# Patient Record
Sex: FEMALE | Race: WHITE | NOT HISPANIC OR LATINO | Employment: OTHER | URBAN - METROPOLITAN AREA
[De-identification: names, ages, dates, MRNs, and addresses within clinical notes are randomized per-mention and may not be internally consistent; named-entity substitution may affect disease eponyms.]

---

## 2017-01-01 ENCOUNTER — HOSPITAL ENCOUNTER (INPATIENT)
Facility: HOSPITAL | Age: 67
LOS: 2 days | Discharge: HOME | DRG: 378 | End: 2017-07-27
Attending: EMERGENCY MEDICINE | Admitting: HOSPITALIST
Payer: MEDICARE

## 2017-01-01 ENCOUNTER — HOSPITAL ENCOUNTER (OUTPATIENT)
Dept: RADIOLOGY | Facility: HOSPITAL | Age: 67
Discharge: HOME | End: 2017-03-20
Attending: INTERNAL MEDICINE
Payer: MEDICARE

## 2017-01-01 ENCOUNTER — OFFICE VISIT (OUTPATIENT)
Dept: FAMILY MEDICINE | Facility: CLINIC | Age: 67
End: 2017-01-01
Payer: MEDICARE

## 2017-01-01 ENCOUNTER — TELEPHONE (OUTPATIENT)
Dept: FAMILY MEDICINE | Facility: CLINIC | Age: 67
End: 2017-01-01

## 2017-01-01 ENCOUNTER — LAB VISIT (OUTPATIENT)
Dept: LAB | Facility: HOSPITAL | Age: 67
End: 2017-01-01
Attending: FAMILY MEDICINE
Payer: MEDICARE

## 2017-01-01 ENCOUNTER — ANESTHESIA (OUTPATIENT)
Dept: INTENSIVE CARE | Facility: HOSPITAL | Age: 67
DRG: 356 | End: 2017-01-01
Payer: MEDICARE

## 2017-01-01 ENCOUNTER — TELEPHONE (OUTPATIENT)
Dept: NEUROLOGY | Facility: HOSPITAL | Age: 67
End: 2017-01-01

## 2017-01-01 ENCOUNTER — ANESTHESIA EVENT (OUTPATIENT)
Dept: SURGERY | Facility: HOSPITAL | Age: 67
DRG: 356 | End: 2017-01-01
Payer: MEDICARE

## 2017-01-01 ENCOUNTER — HOSPITAL ENCOUNTER (EMERGENCY)
Facility: HOSPITAL | Age: 67
Discharge: HOME | End: 2017-07-09
Attending: EMERGENCY MEDICINE
Payer: MEDICARE

## 2017-01-01 ENCOUNTER — ANESTHESIA (OUTPATIENT)
Dept: ENDOSCOPY | Facility: HOSPITAL | Age: 67
DRG: 378 | End: 2017-01-01
Payer: MEDICARE

## 2017-01-01 ENCOUNTER — LAB VISIT (OUTPATIENT)
Dept: LAB | Facility: HOSPITAL | Age: 67
End: 2017-01-01
Attending: INTERNAL MEDICINE
Payer: MEDICARE

## 2017-01-01 ENCOUNTER — ANESTHESIA EVENT (OUTPATIENT)
Dept: INTENSIVE CARE | Facility: HOSPITAL | Age: 67
DRG: 356 | End: 2017-01-01
Payer: MEDICARE

## 2017-01-01 ENCOUNTER — SURGERY (OUTPATIENT)
Age: 67
End: 2017-01-01

## 2017-01-01 ENCOUNTER — HOSPITAL ENCOUNTER (INPATIENT)
Facility: HOSPITAL | Age: 67
LOS: 2 days | Discharge: HOME | DRG: 372 | End: 2017-07-23
Attending: EMERGENCY MEDICINE | Admitting: HOSPITALIST
Payer: MEDICARE

## 2017-01-01 ENCOUNTER — OFFICE VISIT (OUTPATIENT)
Dept: GASTROENTEROLOGY | Facility: CLINIC | Age: 67
End: 2017-01-01
Payer: MEDICARE

## 2017-01-01 ENCOUNTER — ANESTHESIA EVENT (OUTPATIENT)
Dept: ENDOSCOPY | Facility: HOSPITAL | Age: 67
DRG: 378 | End: 2017-01-01
Payer: MEDICARE

## 2017-01-01 ENCOUNTER — HOSPITAL ENCOUNTER (INPATIENT)
Facility: HOSPITAL | Age: 67
LOS: 2 days | DRG: 356 | End: 2017-10-11
Attending: EMERGENCY MEDICINE | Admitting: HOSPITALIST
Payer: MEDICARE

## 2017-01-01 ENCOUNTER — HOSPITAL ENCOUNTER (OUTPATIENT)
Dept: RADIOLOGY | Facility: HOSPITAL | Age: 67
Discharge: HOME | End: 2017-01-06
Attending: FAMILY MEDICINE
Payer: MEDICARE

## 2017-01-01 ENCOUNTER — CLINICAL SUPPORT (OUTPATIENT)
Dept: FAMILY MEDICINE | Facility: CLINIC | Age: 67
End: 2017-01-01
Payer: MEDICARE

## 2017-01-01 ENCOUNTER — PATIENT OUTREACH (OUTPATIENT)
Dept: ADMINISTRATIVE | Facility: CLINIC | Age: 67
End: 2017-01-01

## 2017-01-01 ENCOUNTER — HOSPITAL ENCOUNTER (OUTPATIENT)
Dept: CARDIOLOGY | Facility: HOSPITAL | Age: 67
Discharge: HOME | End: 2017-03-20
Attending: INTERNAL MEDICINE
Payer: MEDICARE

## 2017-01-01 ENCOUNTER — HOSPITAL ENCOUNTER (OUTPATIENT)
Dept: RADIOLOGY | Facility: HOSPITAL | Age: 67
Discharge: HOME | End: 2017-03-17
Attending: INTERNAL MEDICINE
Payer: MEDICARE

## 2017-01-01 ENCOUNTER — ANESTHESIA (OUTPATIENT)
Dept: SURGERY | Facility: HOSPITAL | Age: 67
DRG: 356 | End: 2017-01-01
Payer: MEDICARE

## 2017-01-01 ENCOUNTER — HOSPITAL ENCOUNTER (OUTPATIENT)
Dept: RADIOLOGY | Facility: HOSPITAL | Age: 67
Discharge: HOME | End: 2017-05-08
Attending: FAMILY MEDICINE
Payer: MEDICARE

## 2017-01-01 VITALS
BODY MASS INDEX: 27.48 KG/M2 | DIASTOLIC BLOOD PRESSURE: 61 MMHG | HEIGHT: 60 IN | OXYGEN SATURATION: 96 % | WEIGHT: 140 LBS | SYSTOLIC BLOOD PRESSURE: 167 MMHG | RESPIRATION RATE: 18 BRPM | HEART RATE: 92 BPM | TEMPERATURE: 99 F

## 2017-01-01 VITALS
DIASTOLIC BLOOD PRESSURE: 58 MMHG | OXYGEN SATURATION: 100 % | HEART RATE: 74 BPM | WEIGHT: 134.69 LBS | HEIGHT: 60 IN | BODY MASS INDEX: 26.44 KG/M2 | RESPIRATION RATE: 18 BRPM | TEMPERATURE: 98 F | SYSTOLIC BLOOD PRESSURE: 122 MMHG

## 2017-01-01 VITALS
OXYGEN SATURATION: 95 % | SYSTOLIC BLOOD PRESSURE: 132 MMHG | HEART RATE: 73 BPM | HEIGHT: 60 IN | DIASTOLIC BLOOD PRESSURE: 78 MMHG | WEIGHT: 141.13 LBS | TEMPERATURE: 99 F | BODY MASS INDEX: 27.71 KG/M2

## 2017-01-01 VITALS
HEIGHT: 60 IN | WEIGHT: 134.63 LBS | HEART RATE: 84 BPM | DIASTOLIC BLOOD PRESSURE: 61 MMHG | BODY MASS INDEX: 26.43 KG/M2 | SYSTOLIC BLOOD PRESSURE: 112 MMHG

## 2017-01-01 VITALS
OXYGEN SATURATION: 96 % | WEIGHT: 135.13 LBS | SYSTOLIC BLOOD PRESSURE: 136 MMHG | TEMPERATURE: 98 F | HEART RATE: 84 BPM | BODY MASS INDEX: 26.53 KG/M2 | DIASTOLIC BLOOD PRESSURE: 88 MMHG | HEIGHT: 60 IN

## 2017-01-01 VITALS
WEIGHT: 134 LBS | BODY MASS INDEX: 26.31 KG/M2 | SYSTOLIC BLOOD PRESSURE: 131 MMHG | DIASTOLIC BLOOD PRESSURE: 61 MMHG | HEIGHT: 60 IN | HEART RATE: 84 BPM

## 2017-01-01 VITALS
OXYGEN SATURATION: 98 % | HEIGHT: 60 IN | SYSTOLIC BLOOD PRESSURE: 128 MMHG | HEART RATE: 79 BPM | DIASTOLIC BLOOD PRESSURE: 82 MMHG | BODY MASS INDEX: 26.44 KG/M2 | WEIGHT: 134.69 LBS | TEMPERATURE: 98 F

## 2017-01-01 VITALS
HEART RATE: 80 BPM | HEIGHT: 60 IN | BODY MASS INDEX: 27.61 KG/M2 | SYSTOLIC BLOOD PRESSURE: 128 MMHG | DIASTOLIC BLOOD PRESSURE: 61 MMHG | WEIGHT: 140.63 LBS

## 2017-01-01 VITALS
WEIGHT: 138.88 LBS | SYSTOLIC BLOOD PRESSURE: 99 MMHG | HEIGHT: 60 IN | DIASTOLIC BLOOD PRESSURE: 64 MMHG | OXYGEN SATURATION: 87 % | BODY MASS INDEX: 27.26 KG/M2 | TEMPERATURE: 100 F

## 2017-01-01 VITALS
BODY MASS INDEX: 26.6 KG/M2 | DIASTOLIC BLOOD PRESSURE: 89 MMHG | SYSTOLIC BLOOD PRESSURE: 148 MMHG | TEMPERATURE: 98 F | OXYGEN SATURATION: 95 % | HEIGHT: 60 IN | HEART RATE: 78 BPM | WEIGHT: 135.5 LBS | RESPIRATION RATE: 18 BRPM

## 2017-01-01 VITALS — DIASTOLIC BLOOD PRESSURE: 80 MMHG | SYSTOLIC BLOOD PRESSURE: 142 MMHG

## 2017-01-01 VITALS
WEIGHT: 138.25 LBS | DIASTOLIC BLOOD PRESSURE: 76 MMHG | SYSTOLIC BLOOD PRESSURE: 124 MMHG | OXYGEN SATURATION: 98 % | TEMPERATURE: 97 F | HEART RATE: 72 BPM | RESPIRATION RATE: 18 BRPM | BODY MASS INDEX: 27.14 KG/M2 | HEIGHT: 60 IN

## 2017-01-01 DIAGNOSIS — E11.9 TYPE 2 DIABETES MELLITUS WITHOUT COMPLICATION, WITHOUT LONG-TERM CURRENT USE OF INSULIN: ICD-10-CM

## 2017-01-01 DIAGNOSIS — E78.5 HYPERLIPIDEMIA, UNSPECIFIED HYPERLIPIDEMIA TYPE: ICD-10-CM

## 2017-01-01 DIAGNOSIS — K74.60 CIRRHOSIS OF LIVER WITHOUT ASCITES, UNSPECIFIED HEPATIC CIRRHOSIS TYPE: Primary | ICD-10-CM

## 2017-01-01 DIAGNOSIS — I25.810 CORONARY ARTERY DISEASE INVOLVING NONAUTOLOGOUS BIOLOGICAL CORONARY BYPASS GRAFT WITHOUT ANGINA PECTORIS: Chronic | ICD-10-CM

## 2017-01-01 DIAGNOSIS — I10 ESSENTIAL HYPERTENSION: ICD-10-CM

## 2017-01-01 DIAGNOSIS — E11.9 TYPE 2 DIABETES MELLITUS WITHOUT COMPLICATION, WITHOUT LONG-TERM CURRENT USE OF INSULIN: Primary | Chronic | ICD-10-CM

## 2017-01-01 DIAGNOSIS — K74.60 CIRRHOSIS OF LIVER WITHOUT ASCITES, UNSPECIFIED HEPATIC CIRRHOSIS TYPE: Chronic | ICD-10-CM

## 2017-01-01 DIAGNOSIS — A04.72 C. DIFFICILE COLITIS: ICD-10-CM

## 2017-01-01 DIAGNOSIS — M79.2 NEURALGIA: ICD-10-CM

## 2017-01-01 DIAGNOSIS — I63.9 CEREBROVASCULAR ACCIDENT (CVA), UNSPECIFIED MECHANISM: ICD-10-CM

## 2017-01-01 DIAGNOSIS — K92.2 LOWER GI BLEEDING: Primary | ICD-10-CM

## 2017-01-01 DIAGNOSIS — E11.59 TYPE 2 DIABETES MELLITUS WITH OTHER CIRCULATORY COMPLICATION: ICD-10-CM

## 2017-01-01 DIAGNOSIS — Z95.1 S/P CABG (CORONARY ARTERY BYPASS GRAFT): ICD-10-CM

## 2017-01-01 DIAGNOSIS — Z86.010 H/O ADENOMATOUS POLYP OF COLON: ICD-10-CM

## 2017-01-01 DIAGNOSIS — I25.2 OLD MI (MYOCARDIAL INFARCTION): ICD-10-CM

## 2017-01-01 DIAGNOSIS — E83.42 HYPOMAGNESEMIA: ICD-10-CM

## 2017-01-01 DIAGNOSIS — Z86.73 HISTORY OF STROKE: Chronic | ICD-10-CM

## 2017-01-01 DIAGNOSIS — R27.0 ATAXIA: ICD-10-CM

## 2017-01-01 DIAGNOSIS — R19.7 DIARRHEA OF PRESUMED INFECTIOUS ORIGIN: Primary | ICD-10-CM

## 2017-01-01 DIAGNOSIS — I25.10 CVD (CARDIOVASCULAR DISEASE): ICD-10-CM

## 2017-01-01 DIAGNOSIS — R10.9 ABDOMINAL PAIN, UNSPECIFIED LOCATION: Primary | ICD-10-CM

## 2017-01-01 DIAGNOSIS — A04.72 CLOSTRIDIUM DIFFICILE DIARRHEA: Primary | ICD-10-CM

## 2017-01-01 DIAGNOSIS — K74.60 HEPATIC CIRRHOSIS, UNSPECIFIED HEPATIC CIRRHOSIS TYPE: ICD-10-CM

## 2017-01-01 DIAGNOSIS — F41.9 ANXIETY: Primary | ICD-10-CM

## 2017-01-01 DIAGNOSIS — R10.84 GENERALIZED ABDOMINAL PAIN: ICD-10-CM

## 2017-01-01 DIAGNOSIS — K74.60 CIRRHOSIS OF LIVER WITHOUT ASCITES, UNSPECIFIED HEPATIC CIRRHOSIS TYPE: ICD-10-CM

## 2017-01-01 DIAGNOSIS — F17.200 SMOKES: ICD-10-CM

## 2017-01-01 DIAGNOSIS — Z98.61 S/P PTCA (PERCUTANEOUS TRANSLUMINAL CORONARY ANGIOPLASTY): ICD-10-CM

## 2017-01-01 DIAGNOSIS — F32.A DEPRESSION, UNSPECIFIED DEPRESSION TYPE: ICD-10-CM

## 2017-01-01 DIAGNOSIS — K21.9 GASTROESOPHAGEAL REFLUX DISEASE WITHOUT ESOPHAGITIS: ICD-10-CM

## 2017-01-01 DIAGNOSIS — E11.9 TYPE 2 DIABETES MELLITUS WITHOUT COMPLICATION, WITHOUT LONG-TERM CURRENT USE OF INSULIN: Primary | ICD-10-CM

## 2017-01-01 DIAGNOSIS — E11.9 TYPE 2 DIABETES MELLITUS WITHOUT COMPLICATION, WITHOUT LONG-TERM CURRENT USE OF INSULIN: Chronic | ICD-10-CM

## 2017-01-01 DIAGNOSIS — K21.9 GASTROESOPHAGEAL REFLUX DISEASE, ESOPHAGITIS PRESENCE NOT SPECIFIED: ICD-10-CM

## 2017-01-01 DIAGNOSIS — I10 ESSENTIAL HYPERTENSION: Chronic | ICD-10-CM

## 2017-01-01 DIAGNOSIS — J40 BRONCHITIS: Primary | ICD-10-CM

## 2017-01-01 DIAGNOSIS — K56.600 SMALL BOWEL OBSTRUCTION, PARTIAL: ICD-10-CM

## 2017-01-01 DIAGNOSIS — I25.10 CORONARY ARTERY DISEASE INVOLVING NATIVE CORONARY ARTERY OF NATIVE HEART WITHOUT ANGINA PECTORIS: ICD-10-CM

## 2017-01-01 DIAGNOSIS — F32.A DEPRESSION, UNSPECIFIED DEPRESSION TYPE: Primary | ICD-10-CM

## 2017-01-01 DIAGNOSIS — Z95.1 S/P CABG (CORONARY ARTERY BYPASS GRAFT): Chronic | ICD-10-CM

## 2017-01-01 DIAGNOSIS — K92.1 HEMATOCHEZIA: ICD-10-CM

## 2017-01-01 DIAGNOSIS — I25.10 CORONARY ARTERY DISEASE INVOLVING NATIVE HEART, ANGINA PRESENCE UNSPECIFIED, UNSPECIFIED VESSEL OR LESION TYPE: ICD-10-CM

## 2017-01-01 DIAGNOSIS — K55.9 ISCHEMIC COLITIS: ICD-10-CM

## 2017-01-01 DIAGNOSIS — I25.810 CORONARY ARTERY DISEASE INVOLVING NONAUTOLOGOUS BIOLOGICAL CORONARY BYPASS GRAFT WITHOUT ANGINA PECTORIS: ICD-10-CM

## 2017-01-01 DIAGNOSIS — D62 ACUTE BLOOD LOSS ANEMIA: ICD-10-CM

## 2017-01-01 DIAGNOSIS — R41.3 MEMORY LOSS: ICD-10-CM

## 2017-01-01 DIAGNOSIS — R06.09 DYSPNEA ON EXERTION: ICD-10-CM

## 2017-01-01 DIAGNOSIS — D72.829 LEUKOCYTOSIS, UNSPECIFIED TYPE: ICD-10-CM

## 2017-01-01 DIAGNOSIS — E83.19 IRON OVERLOAD: Primary | ICD-10-CM

## 2017-01-01 DIAGNOSIS — R11.10 VOMITING AND DIARRHEA: ICD-10-CM

## 2017-01-01 DIAGNOSIS — E11.9 TYPE 2 DIABETES MELLITUS WITHOUT COMPLICATION, UNSPECIFIED LONG TERM INSULIN USE STATUS: Primary | ICD-10-CM

## 2017-01-01 DIAGNOSIS — R19.7 INTRACTABLE DIARRHEA: ICD-10-CM

## 2017-01-01 DIAGNOSIS — K92.2 ACUTE UPPER GI BLEED: ICD-10-CM

## 2017-01-01 DIAGNOSIS — R11.12 PROJECTILE VOMITING WITH NAUSEA: ICD-10-CM

## 2017-01-01 DIAGNOSIS — R19.7 VOMITING AND DIARRHEA: ICD-10-CM

## 2017-01-01 DIAGNOSIS — F41.9 ANXIETY: ICD-10-CM

## 2017-01-01 DIAGNOSIS — I10 ESSENTIAL HYPERTENSION: Primary | ICD-10-CM

## 2017-01-01 DIAGNOSIS — N39.0 URINARY TRACT INFECTION WITHOUT HEMATURIA, SITE UNSPECIFIED: ICD-10-CM

## 2017-01-01 DIAGNOSIS — K46.9 HERNIA: Primary | ICD-10-CM

## 2017-01-01 DIAGNOSIS — K92.2 GASTROINTESTINAL HEMORRHAGE, UNSPECIFIED GASTROINTESTINAL HEMORRHAGE TYPE: Primary | ICD-10-CM

## 2017-01-01 DIAGNOSIS — K92.2 GASTROINTESTINAL HEMORRHAGE, UNSPECIFIED GASTROINTESTINAL HEMORRHAGE TYPE: ICD-10-CM

## 2017-01-01 DIAGNOSIS — D64.9 ANEMIA, UNSPECIFIED TYPE: ICD-10-CM

## 2017-01-01 DIAGNOSIS — K55.9: ICD-10-CM

## 2017-01-01 DIAGNOSIS — E11.9 TYPE 2 DIABETES MELLITUS WITHOUT COMPLICATION, UNSPECIFIED LONG TERM INSULIN USE STATUS: ICD-10-CM

## 2017-01-01 DIAGNOSIS — I95.9 HYPOTENSION: ICD-10-CM

## 2017-01-01 DIAGNOSIS — K21.9 GASTROESOPHAGEAL REFLUX DISEASE, ESOPHAGITIS PRESENCE NOT SPECIFIED: Primary | ICD-10-CM

## 2017-01-01 LAB
ABO + RH BLD: NORMAL
ABO + RH BLD: NORMAL
ALBUMIN SERPL BCP-MCNC: 2.2 G/DL
ALBUMIN SERPL BCP-MCNC: 2.3 G/DL
ALBUMIN SERPL BCP-MCNC: 2.8 G/DL
ALBUMIN SERPL BCP-MCNC: 3.6 G/DL
ALBUMIN SERPL BCP-MCNC: 3.6 G/DL
ALBUMIN SERPL BCP-MCNC: 3.8 G/DL
ALBUMIN SERPL BCP-MCNC: 4.7 G/DL
ALBUMIN SERPL BCP-MCNC: 4.8 G/DL
ALBUMIN SERPL-MCNC: 4.3 G/DL (ref 3.6–5.1)
ALBUMIN/GLOB SERPL: 1.7 (CALC) (ref 1–2.5)
ALLENS TEST: ABNORMAL
ALLENS TEST: ABNORMAL
ALP SERPL-CCNC: 110 U/L
ALP SERPL-CCNC: 37 U/L
ALP SERPL-CCNC: 62 U/L
ALP SERPL-CCNC: 81 U/L
ALP SERPL-CCNC: 87 U/L
ALP SERPL-CCNC: 95 U/L
ALP SERPL-CCNC: 97 U/L
ALP SERPL-CCNC: 98 U/L
ALP SERPL-CCNC: 99 U/L (ref 33–130)
ALT SERPL W/O P-5'-P-CCNC: 14 U/L
ALT SERPL W/O P-5'-P-CCNC: 20 U/L
ALT SERPL W/O P-5'-P-CCNC: 24 U/L
ALT SERPL W/O P-5'-P-CCNC: 27 U/L
ALT SERPL W/O P-5'-P-CCNC: 29 U/L
ALT SERPL W/O P-5'-P-CCNC: 32 U/L
ALT SERPL W/O P-5'-P-CCNC: 51 U/L
ALT SERPL W/O P-5'-P-CCNC: 968 U/L
ALT SERPL-CCNC: 16 U/L (ref 6–29)
AMORPH CRY UR QL COMP ASSIST: ABNORMAL
AMYLASE SERPL-CCNC: 47 U/L (ref 21–101)
ANION GAP SERPL CALC-SCNC: 10 MMOL/L
ANION GAP SERPL CALC-SCNC: 13 MMOL/L
ANION GAP SERPL CALC-SCNC: 13 MMOL/L
ANION GAP SERPL CALC-SCNC: 15 MMOL/L
ANION GAP SERPL CALC-SCNC: 17 MMOL/L
ANION GAP SERPL CALC-SCNC: 17 MMOL/L
ANION GAP SERPL CALC-SCNC: 21 MMOL/L
ANION GAP SERPL CALC-SCNC: 23 MMOL/L
ANION GAP SERPL CALC-SCNC: 7 MMOL/L
ANION GAP SERPL CALC-SCNC: 7 MMOL/L
ANION GAP SERPL CALC-SCNC: 8 MMOL/L
ANISOCYTOSIS BLD QL SMEAR: SLIGHT
APPEARANCE UR: CLEAR
APTT BLDCRRT: 22.4 SEC
APTT BLDCRRT: 24.3 SEC
AST SERPL-CCNC: 108 U/L
AST SERPL-CCNC: 1731 U/L
AST SERPL-CCNC: 19 U/L
AST SERPL-CCNC: 22 U/L (ref 10–35)
AST SERPL-CCNC: 35 U/L
AST SERPL-CCNC: 42 U/L
AST SERPL-CCNC: 44 U/L
AST SERPL-CCNC: 46 U/L
AST SERPL-CCNC: 63 U/L
BACTERIA #/AREA URNS AUTO: ABNORMAL /HPF
BACTERIA #/AREA URNS AUTO: ABNORMAL /HPF
BACTERIA #/AREA URNS HPF: ABNORMAL /HPF
BACTERIA BLD CULT: NORMAL
BACTERIA STL CULT: NORMAL
BACTERIA UR CULT: NO GROWTH
BACTERIA UR CULT: NORMAL
BASOPHILS # BLD AUTO: 0 CELLS/UL (ref 0–200)
BASOPHILS # BLD AUTO: 0 K/UL
BASOPHILS # BLD AUTO: 0.01 K/UL
BASOPHILS # BLD AUTO: 0.01 K/UL
BASOPHILS # BLD AUTO: 0.02 K/UL
BASOPHILS # BLD AUTO: 0.03 K/UL
BASOPHILS # BLD AUTO: 8 CELLS/UL (ref 0–200)
BASOPHILS # BLD AUTO: ABNORMAL K/UL
BASOPHILS # BLD AUTO: ABNORMAL K/UL
BASOPHILS NFR BLD AUTO: 0 %
BASOPHILS NFR BLD AUTO: 0.1 %
BASOPHILS NFR BLD: 0 %
BASOPHILS NFR BLD: 0.1 %
BASOPHILS NFR BLD: 0.2 %
BILIRUB SERPL-MCNC: 0.3 MG/DL
BILIRUB SERPL-MCNC: 0.4 MG/DL
BILIRUB SERPL-MCNC: 0.6 MG/DL
BILIRUB SERPL-MCNC: 0.6 MG/DL (ref 0.2–1.2)
BILIRUB SERPL-MCNC: 0.7 MG/DL
BILIRUB SERPL-MCNC: 0.9 MG/DL
BILIRUB SERPL-MCNC: 0.9 MG/DL
BILIRUB SERPL-MCNC: 1.2 MG/DL
BILIRUB SERPL-MCNC: 2.8 MG/DL
BILIRUB UR QL STRIP: ABNORMAL
BILIRUB UR QL STRIP: NEGATIVE
BLD GP AB SCN CELLS X3 SERPL QL: NORMAL
BLD GP AB SCN CELLS X3 SERPL QL: NORMAL
BLD PROD TYP BPU: NORMAL
BLOOD UNIT EXPIRATION DATE: NORMAL
BLOOD UNIT TYPE CODE: 7300
BLOOD UNIT TYPE: NORMAL
BUN SERPL-MCNC: 10 MG/DL
BUN SERPL-MCNC: 10 MG/DL
BUN SERPL-MCNC: 15 MG/DL
BUN SERPL-MCNC: 15 MG/DL (ref 7–25)
BUN SERPL-MCNC: 27 MG/DL
BUN SERPL-MCNC: 27 MG/DL
BUN SERPL-MCNC: 31 MG/DL
BUN SERPL-MCNC: 44 MG/DL
BUN SERPL-MCNC: 46 MG/DL
BUN SERPL-MCNC: 50 MG/DL
BUN SERPL-MCNC: 60 MG/DL
BUN SERPL-MCNC: 9 MG/DL
BUN/CREAT SERPL: 14 (CALC) (ref 6–22)
BURR CELLS BLD QL SMEAR: ABNORMAL
BURR CELLS BLD QL SMEAR: ABNORMAL
C DIFF GDH STL QL: NEGATIVE
C DIFF GDH STL QL: POSITIVE
C DIFF GDH STL QL: POSITIVE
C DIFF TOX A+B STL QL IA: NEGATIVE
C DIFF TOX A+B STL QL IA: NEGATIVE
C DIFF TOX A+B STL QL IA: POSITIVE
C DIFF TOX GENS STL QL NAA+PROBE: NEGATIVE
CALCIUM SERPL-MCNC: 10.1 MG/DL
CALCIUM SERPL-MCNC: 10.3 MG/DL
CALCIUM SERPL-MCNC: 10.4 MG/DL (ref 8.6–10.4)
CALCIUM SERPL-MCNC: 10.6 MG/DL
CALCIUM SERPL-MCNC: 6.6 MG/DL
CALCIUM SERPL-MCNC: 6.6 MG/DL
CALCIUM SERPL-MCNC: 8.1 MG/DL
CALCIUM SERPL-MCNC: 8.4 MG/DL
CALCIUM SERPL-MCNC: 8.5 MG/DL
CALCIUM SERPL-MCNC: 9.4 MG/DL
CHLORIDE SERPL-SCNC: 101 MMOL/L (ref 98–110)
CHLORIDE SERPL-SCNC: 102 MMOL/L
CHLORIDE SERPL-SCNC: 105 MMOL/L
CHLORIDE SERPL-SCNC: 106 MMOL/L
CHLORIDE SERPL-SCNC: 107 MMOL/L
CHLORIDE SERPL-SCNC: 108 MMOL/L
CHLORIDE SERPL-SCNC: 113 MMOL/L
CHLORIDE SERPL-SCNC: 113 MMOL/L
CHLORIDE SERPL-SCNC: 114 MMOL/L
CHLORIDE SERPL-SCNC: 93 MMOL/L
CHLORIDE SERPL-SCNC: 96 MMOL/L
CHLORIDE SERPL-SCNC: 98 MMOL/L
CHOLEST SERPL-MCNC: 153 MG/DL (ref 125–200)
CHOLEST/HDLC SERPL: 4.1 (CALC)
CLARITY UR REFRACT.AUTO: ABNORMAL
CLARITY UR REFRACT.AUTO: CLEAR
CLARITY UR: ABNORMAL
CLARITY UR: CLEAR
CO2 SERPL-SCNC: 11 MMOL/L
CO2 SERPL-SCNC: 15 MMOL/L
CO2 SERPL-SCNC: 17 MMOL/L
CO2 SERPL-SCNC: 18 MMOL/L
CO2 SERPL-SCNC: 19 MMOL/L
CO2 SERPL-SCNC: 20 MMOL/L
CO2 SERPL-SCNC: 22 MMOL/L
CO2 SERPL-SCNC: 23 MMOL/L
CO2 SERPL-SCNC: 27 MMOL/L (ref 20–31)
CO2 SERPL-SCNC: 6 MMOL/L
CODING SYSTEM: NORMAL
COLOR UR AUTO: ABNORMAL
COLOR UR AUTO: YELLOW
COLOR UR: YELLOW
CREAT SERPL-MCNC: 0.9 MG/DL
CREAT SERPL-MCNC: 0.9 MG/DL
CREAT SERPL-MCNC: 1 MG/DL
CREAT SERPL-MCNC: 1.06 MG/DL (ref 0.5–0.99)
CREAT SERPL-MCNC: 1.17 MG/DL
CREAT SERPL-MCNC: 1.2 MG/DL
CREAT SERPL-MCNC: 1.3 MG/DL
CREAT SERPL-MCNC: 1.6 MG/DL
CREAT SERPL-MCNC: 1.66 MG/DL
CREAT SERPL-MCNC: 1.8 MG/DL
CREAT SERPL-MCNC: 2.5 MG/DL
CREAT SERPL-MCNC: 2.7 MG/DL
DACRYOCYTES BLD QL SMEAR: ABNORMAL
DELSYS: ABNORMAL
DELSYS: ABNORMAL
DIASTOLIC DYSFUNCTION: NO
DIFFERENTIAL METHOD: ABNORMAL
DISPENSE STATUS: NORMAL
EOSINOPHIL # BLD AUTO: 0 K/UL
EOSINOPHIL # BLD AUTO: 0.1 K/UL
EOSINOPHIL # BLD AUTO: 114 CELLS/UL (ref 15–500)
EOSINOPHIL # BLD AUTO: 82 CELLS/UL (ref 15–500)
EOSINOPHIL # BLD AUTO: ABNORMAL K/UL
EOSINOPHIL # BLD AUTO: ABNORMAL K/UL
EOSINOPHIL NFR BLD AUTO: 1 %
EOSINOPHIL NFR BLD AUTO: 1.7 %
EOSINOPHIL NFR BLD: 0 %
EOSINOPHIL NFR BLD: 0.1 %
EOSINOPHIL NFR BLD: 0.1 %
EOSINOPHIL NFR BLD: 0.4 %
EOSINOPHIL NFR BLD: 0.5 %
EOSINOPHIL NFR BLD: 0.6 %
EOSINOPHIL NFR BLD: 0.8 %
EOSINOPHIL NFR BLD: 1.1 %
EOSINOPHIL NFR BLD: 1.4 %
EOSINOPHIL NFR BLD: 2.5 %
ERYTHROCYTE [DISTWIDTH] IN BLOOD BY AUTOMATED COUNT: 13.5 %
ERYTHROCYTE [DISTWIDTH] IN BLOOD BY AUTOMATED COUNT: 14.5 % (ref 11–15)
ERYTHROCYTE [DISTWIDTH] IN BLOOD BY AUTOMATED COUNT: 15.7 % (ref 11–15)
ERYTHROCYTE [DISTWIDTH] IN BLOOD BY AUTOMATED COUNT: 16.2 %
ERYTHROCYTE [DISTWIDTH] IN BLOOD BY AUTOMATED COUNT: 16.2 %
ERYTHROCYTE [DISTWIDTH] IN BLOOD BY AUTOMATED COUNT: 16.3 %
ERYTHROCYTE [DISTWIDTH] IN BLOOD BY AUTOMATED COUNT: 16.7 %
ERYTHROCYTE [DISTWIDTH] IN BLOOD BY AUTOMATED COUNT: 16.9 %
ERYTHROCYTE [DISTWIDTH] IN BLOOD BY AUTOMATED COUNT: 17.1 %
ERYTHROCYTE [DISTWIDTH] IN BLOOD BY AUTOMATED COUNT: 17.3 %
ERYTHROCYTE [DISTWIDTH] IN BLOOD BY AUTOMATED COUNT: 17.5 %
ERYTHROCYTE [DISTWIDTH] IN BLOOD BY AUTOMATED COUNT: 17.6 %
ERYTHROCYTE [DISTWIDTH] IN BLOOD BY AUTOMATED COUNT: 17.7 %
ERYTHROCYTE [DISTWIDTH] IN BLOOD BY AUTOMATED COUNT: 17.7 %
ERYTHROCYTE [DISTWIDTH] IN BLOOD BY AUTOMATED COUNT: 18.7 %
ERYTHROCYTE [DISTWIDTH] IN BLOOD BY AUTOMATED COUNT: 19.5 %
ERYTHROCYTE [SEDIMENTATION RATE] IN BLOOD BY WESTERGREN METHOD: 29 MM/H
ERYTHROCYTE [SEDIMENTATION RATE] IN BLOOD BY WESTERGREN METHOD: 30 MM/H
EST. GFR  (AFRICAN AMERICAN): 21 ML/MIN/1.73 M^2
EST. GFR  (AFRICAN AMERICAN): 23 ML/MIN/1.73 M^2
EST. GFR  (AFRICAN AMERICAN): 34 ML/MIN/1.73 M^2
EST. GFR  (AFRICAN AMERICAN): 37 ML/MIN/1.73 M^2
EST. GFR  (AFRICAN AMERICAN): 39 ML/MIN/1.73 M^2
EST. GFR  (AFRICAN AMERICAN): 50 ML/MIN/1.73 M^2
EST. GFR  (AFRICAN AMERICAN): 55 ML/MIN/1.73 M^2
EST. GFR  (AFRICAN AMERICAN): 56.5 ML/MIN/1.73 M^2
EST. GFR  (AFRICAN AMERICAN): >60 ML/MIN/1.73 M^2
EST. GFR  (NON AFRICAN AMERICAN): 18 ML/MIN/1.73 M^2
EST. GFR  (NON AFRICAN AMERICAN): 20 ML/MIN/1.73 M^2
EST. GFR  (NON AFRICAN AMERICAN): 29 ML/MIN/1.73 M^2
EST. GFR  (NON AFRICAN AMERICAN): 32.1 ML/MIN/1.73 M^2
EST. GFR  (NON AFRICAN AMERICAN): 34 ML/MIN/1.73 M^2
EST. GFR  (NON AFRICAN AMERICAN): 43 ML/MIN/1.73 M^2
EST. GFR  (NON AFRICAN AMERICAN): 48 ML/MIN/1.73 M^2
EST. GFR  (NON AFRICAN AMERICAN): 49 ML/MIN/1.73 M^2
EST. GFR  (NON AFRICAN AMERICAN): 59 ML/MIN/1.73 M^2
EST. GFR  (NON AFRICAN AMERICAN): >60 ML/MIN/1.73 M^2
EST. GFR  (NON AFRICAN AMERICAN): >60 ML/MIN/1.73 M^2
ESTIMATED AVG GLUCOSE: 105 MG/DL
ESTIMATED AVG GLUCOSE: 186 MG/DL
FERRITIN SERPL-MCNC: 21 NG/ML
FIBRINOGEN PPP-MCNC: 197 MG/DL
FIO2: 100
GFR SERPL CREATININE-BSD FRML MDRD: 55 ML/MIN/1.73M2
GLOBULIN SER CALC-MCNC: 2.5 G/DL (CALC) (ref 1.9–3.7)
GLUCOSE SERPL-MCNC: 112 MG/DL
GLUCOSE SERPL-MCNC: 132 MG/DL
GLUCOSE SERPL-MCNC: 175 MG/DL
GLUCOSE SERPL-MCNC: 185 MG/DL (ref 65–99)
GLUCOSE SERPL-MCNC: 202 MG/DL
GLUCOSE SERPL-MCNC: 203 MG/DL
GLUCOSE SERPL-MCNC: 208 MG/DL
GLUCOSE SERPL-MCNC: 255 MG/DL
GLUCOSE SERPL-MCNC: 262 MG/DL (ref 70–110)
GLUCOSE SERPL-MCNC: 262 MG/DL (ref 70–110)
GLUCOSE SERPL-MCNC: 351 MG/DL
GLUCOSE SERPL-MCNC: 460 MG/DL
GLUCOSE SERPL-MCNC: 53 MG/DL
GLUCOSE SERPL-MCNC: 98 MG/DL
GLUCOSE UR QL STRIP: ABNORMAL
GLUCOSE UR QL STRIP: NEGATIVE
HBA1C MFR BLD HPLC: 5.3 %
HBA1C MFR BLD HPLC: 8.1 %
HBA1C MFR BLD: 6.2 % OF TOTAL HGB
HBA1C MFR BLD: 6.4 % OF TOTAL HGB
HCO3 UR-SCNC: 10.8 MMOL/L (ref 24–28)
HCO3 UR-SCNC: 9.8 MMOL/L (ref 24–28)
HCT VFR BLD AUTO: 12 %
HCT VFR BLD AUTO: 12.4 %
HCT VFR BLD AUTO: 18 %
HCT VFR BLD AUTO: 19.2 %
HCT VFR BLD AUTO: 20.5 %
HCT VFR BLD AUTO: 24.4 %
HCT VFR BLD AUTO: 25.1 %
HCT VFR BLD AUTO: 25.9 %
HCT VFR BLD AUTO: 25.9 %
HCT VFR BLD AUTO: 26.2 %
HCT VFR BLD AUTO: 28.5 %
HCT VFR BLD AUTO: 28.9 %
HCT VFR BLD AUTO: 29.9 %
HCT VFR BLD AUTO: 29.9 %
HCT VFR BLD AUTO: 31.5 %
HCT VFR BLD AUTO: 32 %
HCT VFR BLD AUTO: 32.2 % (ref 35–45)
HCT VFR BLD AUTO: 34.1 % (ref 35–45)
HDLC SERPL-MCNC: 37 MG/DL
HGB BLD-MCNC: 10.2 G/DL (ref 11.7–15.5)
HGB BLD-MCNC: 11.5 G/DL
HGB BLD-MCNC: 11.9 G/DL (ref 11.7–15.5)
HGB BLD-MCNC: 3.9 G/DL
HGB BLD-MCNC: 3.9 G/DL
HGB BLD-MCNC: 6.2 G/DL
HGB BLD-MCNC: 6.3 G/DL
HGB BLD-MCNC: 7 G/DL
HGB BLD-MCNC: 8.3 G/DL
HGB BLD-MCNC: 8.3 G/DL
HGB BLD-MCNC: 8.5 G/DL
HGB BLD-MCNC: 8.7 G/DL
HGB BLD-MCNC: 8.9 G/DL
HGB BLD-MCNC: 9.1 G/DL
HGB BLD-MCNC: 9.5 G/DL
HGB BLD-MCNC: 9.5 G/DL
HGB BLD-MCNC: 9.8 G/DL
HGB BLD-MCNC: 9.9 G/DL
HGB UR QL STRIP: ABNORMAL
HGB UR QL STRIP: NEGATIVE
HYALINE CASTS #/AREA URNS LPF: 3 /LPF
HYALINE CASTS UR QL AUTO: 0 /LPF
HYALINE CASTS UR QL AUTO: 15 /LPF
HYPOCHROMIA BLD QL SMEAR: ABNORMAL
INR PPP: 1
INR PPP: 1
INR PPP: 1.4
IRON SERPL-MCNC: 306 UG/DL
KETONES UR QL STRIP: NEGATIVE
LACTATE SERPL-SCNC: 11.2 MMOL/L
LACTATE SERPL-SCNC: 2.8 MMOL/L
LACTATE SERPL-SCNC: 3.4 MMOL/L
LACTATE SERPL-SCNC: 3.5 MMOL/L
LACTATE SERPL-SCNC: 7.8 MMOL/L
LACTATE SERPL-SCNC: 7.9 MMOL/L
LACTATE SERPL-SCNC: 8.1 MMOL/L
LACTATE SERPL-SCNC: >12 MMOL/L
LACTATE SERPL-SCNC: >12 MMOL/L
LDLC SERPL CALC-MCNC: 49 MG/DL (CALC)
LEUKOCYTE ESTERASE UR QL STRIP: ABNORMAL
LEUKOCYTE ESTERASE UR QL STRIP: NEGATIVE
LIPASE SERPL-CCNC: 13 U/L
LIPASE SERPL-CCNC: 16 U/L (ref 7–60)
LIPASE SERPL-CCNC: 479 U/L
LIPASE SERPL-CCNC: 99 U/L
LYMPHOCYTES # BLD AUTO: 0.7 K/UL
LYMPHOCYTES # BLD AUTO: 1 K/UL
LYMPHOCYTES # BLD AUTO: 1.1 K/UL
LYMPHOCYTES # BLD AUTO: 1.2 K/UL
LYMPHOCYTES # BLD AUTO: 1.2 K/UL
LYMPHOCYTES # BLD AUTO: 1.3 K/UL
LYMPHOCYTES # BLD AUTO: 1.4 K/UL
LYMPHOCYTES # BLD AUTO: 1.8 K/UL
LYMPHOCYTES # BLD AUTO: 1296 CELLS/UL (ref 850–3900)
LYMPHOCYTES # BLD AUTO: 1340 CELLS/UL (ref 850–3900)
LYMPHOCYTES # BLD AUTO: 2 K/UL
LYMPHOCYTES # BLD AUTO: 2.1 K/UL
LYMPHOCYTES # BLD AUTO: ABNORMAL K/UL
LYMPHOCYTES # BLD AUTO: ABNORMAL K/UL
LYMPHOCYTES NFR BLD AUTO: 15.8 %
LYMPHOCYTES NFR BLD AUTO: 20 %
LYMPHOCYTES NFR BLD: 1 %
LYMPHOCYTES NFR BLD: 11.2 %
LYMPHOCYTES NFR BLD: 16.7 %
LYMPHOCYTES NFR BLD: 18.9 %
LYMPHOCYTES NFR BLD: 21 %
LYMPHOCYTES NFR BLD: 27.4 %
LYMPHOCYTES NFR BLD: 34 %
LYMPHOCYTES NFR BLD: 4.3 %
LYMPHOCYTES NFR BLD: 4.8 %
LYMPHOCYTES NFR BLD: 7.8 %
LYMPHOCYTES NFR BLD: 7.8 %
LYMPHOCYTES NFR BLD: 9.5 %
MAGNESIUM SERPL-MCNC: 1.3 MG/DL
MAGNESIUM SERPL-MCNC: 1.3 MG/DL
MAGNESIUM SERPL-MCNC: 1.8 MG/DL
MAGNESIUM SERPL-MCNC: 1.9 MG/DL
MCH RBC QN AUTO: 23.8 PG
MCH RBC QN AUTO: 24.7 PG
MCH RBC QN AUTO: 25.3 PG
MCH RBC QN AUTO: 27.5 PG
MCH RBC QN AUTO: 28.5 PG
MCH RBC QN AUTO: 28.6 PG
MCH RBC QN AUTO: 28.6 PG (ref 27–33)
MCH RBC QN AUTO: 29.4 PG
MCH RBC QN AUTO: 30.3 PG
MCH RBC QN AUTO: 30.4 PG
MCH RBC QN AUTO: 30.8 PG
MCH RBC QN AUTO: 30.9 PG
MCH RBC QN AUTO: 31.1 PG
MCH RBC QN AUTO: 31.9 PG (ref 27–33)
MCHC RBC AUTO-ENTMCNC: 30.4 G/DL
MCHC RBC AUTO-ENTMCNC: 30.6 G/DL
MCHC RBC AUTO-ENTMCNC: 31.5 G/DL
MCHC RBC AUTO-ENTMCNC: 31.7 G/DL (ref 32–36)
MCHC RBC AUTO-ENTMCNC: 32.8 G/DL
MCHC RBC AUTO-ENTMCNC: 32.8 G/DL
MCHC RBC AUTO-ENTMCNC: 32.9 G/DL
MCHC RBC AUTO-ENTMCNC: 33.1 G/DL
MCHC RBC AUTO-ENTMCNC: 33.6 G/DL
MCHC RBC AUTO-ENTMCNC: 34 G/DL
MCHC RBC AUTO-ENTMCNC: 34 G/DL
MCHC RBC AUTO-ENTMCNC: 34.1 G/DL
MCHC RBC AUTO-ENTMCNC: 34.4 G/DL
MCHC RBC AUTO-ENTMCNC: 34.7 G/DL
MCHC RBC AUTO-ENTMCNC: 35 G/DL (ref 32–36)
MCHC RBC AUTO-ENTMCNC: 36.5 %
MCV RBC AUTO: 77 FL
MCV RBC AUTO: 78 FL
MCV RBC AUTO: 79 FL
MCV RBC AUTO: 83 FL
MCV RBC AUTO: 84 FL
MCV RBC AUTO: 85 FL
MCV RBC AUTO: 89 FL
MCV RBC AUTO: 90 FL
MCV RBC AUTO: 90.2 FL (ref 80–100)
MCV RBC AUTO: 91 FL
MCV RBC AUTO: 91.2 FL (ref 80–100)
MCV RBC AUTO: 92 FL
MCV RBC AUTO: 93 FL
MICROSCOPIC COMMENT: ABNORMAL
MODE: ABNORMAL
MONOCYTES # BLD AUTO: 0.5 K/UL
MONOCYTES # BLD AUTO: 0.5 K/UL
MONOCYTES # BLD AUTO: 0.6 K/UL
MONOCYTES # BLD AUTO: 0.7 K/UL
MONOCYTES # BLD AUTO: 1 K/UL
MONOCYTES # BLD AUTO: 1 K/UL
MONOCYTES # BLD AUTO: 1.2 K/UL
MONOCYTES # BLD AUTO: 1.8 K/UL
MONOCYTES # BLD AUTO: 563 CELLS/UL (ref 200–950)
MONOCYTES # BLD AUTO: 574 CELLS/UL (ref 200–950)
MONOCYTES # BLD AUTO: ABNORMAL K/UL
MONOCYTES # BLD AUTO: ABNORMAL K/UL
MONOCYTES NFR BLD AUTO: 7 %
MONOCYTES NFR BLD AUTO: 8.4 %
MONOCYTES NFR BLD: 3 %
MONOCYTES NFR BLD: 4.4 %
MONOCYTES NFR BLD: 6.7 %
MONOCYTES NFR BLD: 6.9 %
MONOCYTES NFR BLD: 7 %
MONOCYTES NFR BLD: 7.1 %
MONOCYTES NFR BLD: 7.3 %
MONOCYTES NFR BLD: 8.6 %
MONOCYTES NFR BLD: 8.7 %
MONOCYTES NFR BLD: 8.8 %
MONOCYTES NFR BLD: 8.8 %
MONOCYTES NFR BLD: 9.3 %
MYELOCYTES NFR BLD MANUAL: 2 %
NEUTROPHILS # BLD AUTO: 11.7 K/UL
NEUTROPHILS # BLD AUTO: 14.5 K/UL
NEUTROPHILS # BLD AUTO: 14.6 K/UL
NEUTROPHILS # BLD AUTO: 23.6 K/UL
NEUTROPHILS # BLD AUTO: 4 K/UL
NEUTROPHILS # BLD AUTO: 4 K/UL
NEUTROPHILS # BLD AUTO: 4683 CELLS/UL (ref 1500–7800)
NEUTROPHILS # BLD AUTO: 6.7 K/UL
NEUTROPHILS # BLD AUTO: 6240 CELLS/UL (ref 1500–7800)
NEUTROPHILS # BLD AUTO: 7.8 K/UL
NEUTROPHILS # BLD AUTO: 8.9 K/UL
NEUTROPHILS # BLD AUTO: 9.3 K/UL
NEUTROPHILS NFR BLD AUTO: 69.9 %
NEUTROPHILS NFR BLD AUTO: 76.1 %
NEUTROPHILS NFR BLD: 5 %
NEUTROPHILS NFR BLD: 62.7 %
NEUTROPHILS NFR BLD: 69.7 %
NEUTROPHILS NFR BLD: 70.6 %
NEUTROPHILS NFR BLD: 71.5 %
NEUTROPHILS NFR BLD: 80.6 %
NEUTROPHILS NFR BLD: 83.6 %
NEUTROPHILS NFR BLD: 84.1 %
NEUTROPHILS NFR BLD: 85.1 %
NEUTROPHILS NFR BLD: 86.4 %
NEUTROPHILS NFR BLD: 87 %
NEUTROPHILS NFR BLD: 88.8 %
NEUTS BAND NFR BLD MANUAL: 52 %
NEUTS BAND NFR BLD MANUAL: 9 %
NITRITE UR QL STRIP: NEGATIVE
NON-SQ EPI CELLS #/AREA URNS AUTO: 2 /HPF
NONHDLC SERPL-MCNC: 116 MG/DL (CALC)
NRBC BLD-RTO: ABNORMAL /100 WBC
O+P STL TRI STN: NORMAL
OB PNL STL: POSITIVE
OB PNL STL: POSITIVE
OVALOCYTES BLD QL SMEAR: ABNORMAL
OVALOCYTES BLD QL SMEAR: ABNORMAL
PCO2 BLDA: 17.2 MMHG (ref 35–45)
PCO2 BLDA: 36.1 MMHG (ref 35–45)
PEEP: 5
PH SMN: 7.09 [PH] (ref 7.35–7.45)
PH SMN: 7.36 [PH] (ref 7.35–7.45)
PH UR STRIP: 5 [PH] (ref 5–8)
PH UR STRIP: 6 [PH] (ref 5–8)
PH UR STRIP: 7 [PH] (ref 5–8)
PHOSPHATE SERPL-MCNC: 3.9 MG/DL
PHOSPHATE SERPL-MCNC: 4.3 MG/DL
PLATELET # BLD AUTO: 127 K/UL
PLATELET # BLD AUTO: 130 K/UL
PLATELET # BLD AUTO: 131 K/UL
PLATELET # BLD AUTO: 156 THOUSAND/UL (ref 140–400)
PLATELET # BLD AUTO: 161 K/UL
PLATELET # BLD AUTO: 179 K/UL
PLATELET # BLD AUTO: 185 K/UL
PLATELET # BLD AUTO: 188 K/UL
PLATELET # BLD AUTO: 191 K/UL
PLATELET # BLD AUTO: 196 K/UL
PLATELET # BLD AUTO: 201 K/UL
PLATELET # BLD AUTO: 202 THOUSAND/UL (ref 140–400)
PLATELET # BLD AUTO: 215 K/UL
PLATELET # BLD AUTO: 260 K/UL
PLATELET # BLD AUTO: 269 K/UL
PLATELET # BLD AUTO: 281 K/UL
PLATELET BLD QL SMEAR: ABNORMAL
PMV BLD AUTO: 10 FL
PMV BLD AUTO: 10.1 FL
PMV BLD AUTO: 10.2 FL
PMV BLD AUTO: 10.3 FL
PMV BLD AUTO: 10.3 FL
PMV BLD AUTO: 10.5 FL
PMV BLD AUTO: 10.8 FL
PMV BLD AUTO: 10.9 FL
PMV BLD AUTO: 8.7 FL
PMV BLD AUTO: 9 FL
PMV BLD AUTO: 9.2 FL
PMV BLD AUTO: 9.2 FL
PMV BLD AUTO: 9.5 FL
PMV BLD AUTO: 9.5 FL
PMV BLD REES-ECKER: 10.2 FL (ref 7.5–12.5)
PMV BLD REES-ECKER: 8.4 FL (ref 7.5–12.5)
PO2 BLDA: 306 MMHG (ref 80–100)
PO2 BLDA: 86 MMHG (ref 80–100)
POC BE: -16 MMOL/L
POC BE: -19 MMOL/L
POC SATURATED O2: 100 % (ref 95–100)
POC SATURATED O2: 97 % (ref 95–100)
POC TCO2: 10 MMOL/L (ref 23–27)
POC TCO2: 12 MMOL/L (ref 23–27)
POCT GLUCOSE: 100 MG/DL (ref 70–110)
POCT GLUCOSE: 106 MG/DL (ref 70–110)
POCT GLUCOSE: 111 MG/DL (ref 70–110)
POCT GLUCOSE: 112 MG/DL (ref 70–110)
POCT GLUCOSE: 114 MG/DL (ref 70–110)
POCT GLUCOSE: 114 MG/DL (ref 70–110)
POCT GLUCOSE: 120 MG/DL (ref 70–110)
POCT GLUCOSE: 123 MG/DL (ref 70–110)
POCT GLUCOSE: 124 MG/DL (ref 70–110)
POCT GLUCOSE: 132 MG/DL (ref 70–110)
POCT GLUCOSE: 135 MG/DL (ref 70–110)
POCT GLUCOSE: 139 MG/DL (ref 70–110)
POCT GLUCOSE: 141 MG/DL (ref 70–110)
POCT GLUCOSE: 141 MG/DL (ref 70–110)
POCT GLUCOSE: 144 MG/DL (ref 70–110)
POCT GLUCOSE: 155 MG/DL (ref 70–110)
POCT GLUCOSE: 155 MG/DL (ref 70–110)
POCT GLUCOSE: 156 MG/DL (ref 70–110)
POCT GLUCOSE: 162 MG/DL (ref 70–110)
POCT GLUCOSE: 165 MG/DL (ref 70–110)
POCT GLUCOSE: 165 MG/DL (ref 70–110)
POCT GLUCOSE: 169 MG/DL (ref 70–110)
POCT GLUCOSE: 170 MG/DL (ref 70–110)
POCT GLUCOSE: 172 MG/DL (ref 70–110)
POCT GLUCOSE: 173 MG/DL (ref 70–110)
POCT GLUCOSE: 176 MG/DL (ref 70–110)
POCT GLUCOSE: 191 MG/DL (ref 70–110)
POCT GLUCOSE: 195 MG/DL (ref 70–110)
POCT GLUCOSE: 208 MG/DL (ref 70–110)
POCT GLUCOSE: 220 MG/DL (ref 70–110)
POCT GLUCOSE: 227 MG/DL (ref 70–110)
POCT GLUCOSE: 282 MG/DL (ref 70–110)
POCT GLUCOSE: 291 MG/DL (ref 70–110)
POCT GLUCOSE: 317 MG/DL (ref 70–110)
POCT GLUCOSE: 346 MG/DL (ref 70–110)
POCT GLUCOSE: 372 MG/DL (ref 70–110)
POCT GLUCOSE: 379 MG/DL (ref 70–110)
POCT GLUCOSE: 391 MG/DL (ref 70–110)
POCT GLUCOSE: 411 MG/DL (ref 70–110)
POCT GLUCOSE: 418 MG/DL (ref 70–110)
POCT GLUCOSE: 422 MG/DL (ref 70–110)
POCT GLUCOSE: 435 MG/DL (ref 70–110)
POCT GLUCOSE: 446 MG/DL (ref 70–110)
POCT GLUCOSE: 490 MG/DL (ref 70–110)
POCT GLUCOSE: 60 MG/DL (ref 70–110)
POCT GLUCOSE: 87 MG/DL (ref 70–110)
POCT GLUCOSE: 97 MG/DL (ref 70–110)
POIKILOCYTOSIS BLD QL SMEAR: SLIGHT
POLYCHROMASIA BLD QL SMEAR: ABNORMAL
POLYCHROMASIA BLD QL SMEAR: ABNORMAL
POTASSIUM SERPL-SCNC: 3.2 MMOL/L
POTASSIUM SERPL-SCNC: 3.5 MMOL/L
POTASSIUM SERPL-SCNC: 3.7 MMOL/L
POTASSIUM SERPL-SCNC: 4.2 MMOL/L
POTASSIUM SERPL-SCNC: 4.3 MMOL/L
POTASSIUM SERPL-SCNC: 4.3 MMOL/L
POTASSIUM SERPL-SCNC: 4.5 MMOL/L (ref 3.5–5.3)
POTASSIUM SERPL-SCNC: 4.7 MMOL/L
POTASSIUM SERPL-SCNC: 4.8 MMOL/L
POTASSIUM SERPL-SCNC: 4.9 MMOL/L
POTASSIUM SERPL-SCNC: 4.9 MMOL/L
POTASSIUM SERPL-SCNC: 5.6 MMOL/L
PROT SERPL-MCNC: 3.8 G/DL
PROT SERPL-MCNC: 4.4 G/DL
PROT SERPL-MCNC: 5.7 G/DL
PROT SERPL-MCNC: 6.6 G/DL
PROT SERPL-MCNC: 6.8 G/DL
PROT SERPL-MCNC: 6.8 G/DL (ref 6.1–8.1)
PROT SERPL-MCNC: 7.7 G/DL
PROT SERPL-MCNC: 8.1 G/DL
PROT SERPL-MCNC: 8.4 G/DL
PROT UR QL STRIP: ABNORMAL
PROT UR QL STRIP: NEGATIVE
PROTHROMBIN TIME: 10.4 SEC
PROTHROMBIN TIME: 10.8 SEC
PROTHROMBIN TIME: 14.9 SEC
RBC # BLD AUTO: 1.58 M/UL
RBC # BLD AUTO: 2.01 M/UL
RBC # BLD AUTO: 2.07 M/UL
RBC # BLD AUTO: 2.31 M/UL
RBC # BLD AUTO: 2.69 M/UL
RBC # BLD AUTO: 2.74 M/UL
RBC # BLD AUTO: 2.89 M/UL
RBC # BLD AUTO: 3.05 M/UL
RBC # BLD AUTO: 3.11 M/UL
RBC # BLD AUTO: 3.18 M/UL
RBC # BLD AUTO: 3.57 M/UL
RBC # BLD AUTO: 3.57 MILLION/UL (ref 3.8–5.1)
RBC # BLD AUTO: 3.74 MILLION/UL (ref 3.8–5.1)
RBC # BLD AUTO: 3.76 M/UL
RBC # BLD AUTO: 3.79 M/UL
RBC # BLD AUTO: 3.83 M/UL
RBC #/AREA URNS AUTO: 2 /HPF (ref 0–4)
RBC #/AREA URNS AUTO: 6 /HPF (ref 0–4)
RBC #/AREA URNS HPF: 8 /HPF (ref 0–4)
SAMPLE: ABNORMAL
SAMPLE: ABNORMAL
SATURATED IRON: 67 %
SITE: ABNORMAL
SITE: ABNORMAL
SODIUM SERPL-SCNC: 131 MMOL/L
SODIUM SERPL-SCNC: 133 MMOL/L
SODIUM SERPL-SCNC: 134 MMOL/L
SODIUM SERPL-SCNC: 136 MMOL/L
SODIUM SERPL-SCNC: 136 MMOL/L
SODIUM SERPL-SCNC: 137 MMOL/L
SODIUM SERPL-SCNC: 137 MMOL/L (ref 135–146)
SODIUM SERPL-SCNC: 138 MMOL/L
SODIUM SERPL-SCNC: 141 MMOL/L
SODIUM SERPL-SCNC: 149 MMOL/L
SP GR UR STRIP: 1.01 (ref 1–1.03)
SP GR UR STRIP: 1.02 (ref 1–1.03)
SP GR UR STRIP: 1.02 (ref 1–1.03)
SQUAMOUS #/AREA URNS AUTO: ABNORMAL /HPF
SQUAMOUS #/AREA URNS HPF: ABNORMAL /HPF
TARGETS BLD QL SMEAR: ABNORMAL
TOTAL IRON BINDING CAPACITY: 457 UG/DL
TRANS ERYTHROCYTES VOL PATIENT: NORMAL ML
TRANSFERRIN SERPL-MCNC: 309 MG/DL
TRIGL SERPL-MCNC: 337 MG/DL
TROPONIN I SERPL DL<=0.01 NG/ML-MCNC: 0.15 NG/ML
URN SPEC COLLECT METH UR: ABNORMAL
UROBILINOGEN UR STRIP-ACNC: NEGATIVE EU/DL
VT: 270
WBC # BLD AUTO: 10.63 K/UL
WBC # BLD AUTO: 10.96 K/UL
WBC # BLD AUTO: 11.11 K/UL
WBC # BLD AUTO: 11.11 K/UL
WBC # BLD AUTO: 13.58 K/UL
WBC # BLD AUTO: 17.01 K/UL
WBC # BLD AUTO: 17.39 K/UL
WBC # BLD AUTO: 20.36 K/UL
WBC # BLD AUTO: 26.88 K/UL
WBC # BLD AUTO: 34.62 K/UL
WBC # BLD AUTO: 5.69 K/UL
WBC # BLD AUTO: 6.38 K/UL
WBC # BLD AUTO: 6.7 THOUSAND/UL (ref 3.8–10.8)
WBC # BLD AUTO: 8.2 THOUSAND/UL (ref 3.8–10.8)
WBC # BLD AUTO: 9.07 K/UL
WBC # BLD AUTO: 9.56 K/UL
WBC #/AREA URNS AUTO: 15 /HPF (ref 0–5)
WBC #/AREA URNS AUTO: 6 /HPF (ref 0–5)
WBC #/AREA URNS HPF: 1 /HPF (ref 0–5)
WBC NRBC COR # BLD: 15.31 K/UL
YEAST UR QL AUTO: ABNORMAL
YEAST UR QL AUTO: ABNORMAL
YEAST URNS QL MICRO: ABNORMAL

## 2017-01-01 PROCEDURE — 99214 OFFICE O/P EST MOD 30 MIN: CPT | Mod: S$GLB,,, | Performed by: FAMILY MEDICINE

## 2017-01-01 PROCEDURE — 5A1945Z RESPIRATORY VENTILATION, 24-96 CONSECUTIVE HOURS: ICD-10-PCS | Performed by: HOSPITALIST

## 2017-01-01 PROCEDURE — 0BH18EZ INSERTION OF ENDOTRACHEAL AIRWAY INTO TRACHEA, VIA NATURAL OR ARTIFICIAL OPENING ENDOSCOPIC: ICD-10-PCS | Performed by: HOSPITALIST

## 2017-01-01 PROCEDURE — 25000003 PHARM REV CODE 250: Performed by: INTERNAL MEDICINE

## 2017-01-01 PROCEDURE — 85610 PROTHROMBIN TIME: CPT

## 2017-01-01 PROCEDURE — 96367 TX/PROPH/DG ADDL SEQ IV INF: CPT

## 2017-01-01 PROCEDURE — 85007 BL SMEAR W/DIFF WBC COUNT: CPT

## 2017-01-01 PROCEDURE — 63600175 PHARM REV CODE 636 W HCPCS: Performed by: NURSE PRACTITIONER

## 2017-01-01 PROCEDURE — 85025 COMPLETE CBC W/AUTO DIFF WBC: CPT

## 2017-01-01 PROCEDURE — 87493 C DIFF AMPLIFIED PROBE: CPT

## 2017-01-01 PROCEDURE — 83605 ASSAY OF LACTIC ACID: CPT

## 2017-01-01 PROCEDURE — 25000003 PHARM REV CODE 250: Performed by: PHYSICIAN ASSISTANT

## 2017-01-01 PROCEDURE — 84100 ASSAY OF PHOSPHORUS: CPT

## 2017-01-01 PROCEDURE — 87045 FECES CULTURE AEROBIC BACT: CPT

## 2017-01-01 PROCEDURE — 36000706: Performed by: STUDENT IN AN ORGANIZED HEALTH CARE EDUCATION/TRAINING PROGRAM

## 2017-01-01 PROCEDURE — 82962 GLUCOSE BLOOD TEST: CPT

## 2017-01-01 PROCEDURE — 25000003 PHARM REV CODE 250: Performed by: NURSE PRACTITIONER

## 2017-01-01 PROCEDURE — 81003 URINALYSIS AUTO W/O SCOPE: CPT

## 2017-01-01 PROCEDURE — 83605 ASSAY OF LACTIC ACID: CPT | Mod: 91

## 2017-01-01 PROCEDURE — 94761 N-INVAS EAR/PLS OXIMETRY MLT: CPT

## 2017-01-01 PROCEDURE — 80053 COMPREHEN METABOLIC PANEL: CPT

## 2017-01-01 PROCEDURE — 87046 STOOL CULTR AEROBIC BACT EA: CPT

## 2017-01-01 PROCEDURE — 36415 COLL VENOUS BLD VENIPUNCTURE: CPT

## 2017-01-01 PROCEDURE — 80053 COMPREHEN METABOLIC PANEL: CPT | Mod: 91

## 2017-01-01 PROCEDURE — 99285 EMERGENCY DEPT VISIT HI MDM: CPT | Mod: 25

## 2017-01-01 PROCEDURE — 87040 BLOOD CULTURE FOR BACTERIA: CPT

## 2017-01-01 PROCEDURE — 63600175 PHARM REV CODE 636 W HCPCS: Performed by: NURSE ANESTHETIST, CERTIFIED REGISTERED

## 2017-01-01 PROCEDURE — 80048 BASIC METABOLIC PNL TOTAL CA: CPT

## 2017-01-01 PROCEDURE — 20000000 HC ICU ROOM

## 2017-01-01 PROCEDURE — 11000001 HC ACUTE MED/SURG PRIVATE ROOM

## 2017-01-01 PROCEDURE — 84484 ASSAY OF TROPONIN QUANT: CPT

## 2017-01-01 PROCEDURE — 83036 HEMOGLOBIN GLYCOSYLATED A1C: CPT

## 2017-01-01 PROCEDURE — 36415 COLL VENOUS BLD VENIPUNCTURE: CPT | Mod: PO

## 2017-01-01 PROCEDURE — 87086 URINE CULTURE/COLONY COUNT: CPT

## 2017-01-01 PROCEDURE — 96361 HYDRATE IV INFUSION ADD-ON: CPT

## 2017-01-01 PROCEDURE — P9021 RED BLOOD CELLS UNIT: HCPCS

## 2017-01-01 PROCEDURE — 96366 THER/PROPH/DIAG IV INF ADDON: CPT

## 2017-01-01 PROCEDURE — 36600 WITHDRAWAL OF ARTERIAL BLOOD: CPT

## 2017-01-01 PROCEDURE — 96376 TX/PRO/DX INJ SAME DRUG ADON: CPT

## 2017-01-01 PROCEDURE — 93016 CV STRESS TEST SUPVJ ONLY: CPT | Mod: ,,, | Performed by: INTERNAL MEDICINE

## 2017-01-01 PROCEDURE — 0WJP0ZZ INSPECTION OF GASTROINTESTINAL TRACT, OPEN APPROACH: ICD-10-PCS | Performed by: STUDENT IN AN ORGANIZED HEALTH CARE EDUCATION/TRAINING PROGRAM

## 2017-01-01 PROCEDURE — 87209 SMEAR COMPLEX STAIN: CPT

## 2017-01-01 PROCEDURE — 27000221 HC OXYGEN, UP TO 24 HOURS

## 2017-01-01 PROCEDURE — 25500020 PHARM REV CODE 255: Performed by: EMERGENCY MEDICINE

## 2017-01-01 PROCEDURE — 96365 THER/PROPH/DIAG IV INF INIT: CPT

## 2017-01-01 PROCEDURE — 63600175 PHARM REV CODE 636 W HCPCS: Performed by: EMERGENCY MEDICINE

## 2017-01-01 PROCEDURE — 96365 THER/PROPH/DIAG IV INF INIT: CPT | Mod: 59

## 2017-01-01 PROCEDURE — 36430 TRANSFUSION BLD/BLD COMPNT: CPT

## 2017-01-01 PROCEDURE — 85027 COMPLETE CBC AUTOMATED: CPT

## 2017-01-01 PROCEDURE — 87040 BLOOD CULTURE FOR BACTERIA: CPT | Mod: 59

## 2017-01-01 PROCEDURE — 99999 PR PBB SHADOW E&M-EST. PATIENT-LVL III: CPT | Mod: PBBFAC,,, | Performed by: INTERNAL MEDICINE

## 2017-01-01 PROCEDURE — 49000 EXPLORATION OF ABDOMEN: CPT | Mod: 80,,, | Performed by: SURGERY

## 2017-01-01 PROCEDURE — 37000009 HC ANESTHESIA EA ADD 15 MINS: Performed by: INTERNAL MEDICINE

## 2017-01-01 PROCEDURE — 83735 ASSAY OF MAGNESIUM: CPT

## 2017-01-01 PROCEDURE — 3078F DIAST BP <80 MM HG: CPT | Mod: ,,, | Performed by: INTERNAL MEDICINE

## 2017-01-01 PROCEDURE — 82272 OCCULT BLD FECES 1-3 TESTS: CPT

## 2017-01-01 PROCEDURE — 93018 CV STRESS TEST I&R ONLY: CPT | Mod: ,,, | Performed by: INTERNAL MEDICINE

## 2017-01-01 PROCEDURE — 99223 1ST HOSP IP/OBS HIGH 75: CPT | Mod: ,,, | Performed by: SURGERY

## 2017-01-01 PROCEDURE — 99214 OFFICE O/P EST MOD 30 MIN: CPT | Mod: PBBFAC,PN | Performed by: INTERNAL MEDICINE

## 2017-01-01 PROCEDURE — 71020 XR CHEST PA AND LATERAL: CPT | Mod: 26,,, | Performed by: RADIOLOGY

## 2017-01-01 PROCEDURE — 63600175 PHARM REV CODE 636 W HCPCS: Performed by: INTERNAL MEDICINE

## 2017-01-01 PROCEDURE — 63600175 PHARM REV CODE 636 W HCPCS: Performed by: HOSPITALIST

## 2017-01-01 PROCEDURE — 37000009 HC ANESTHESIA EA ADD 15 MINS: Performed by: STUDENT IN AN ORGANIZED HEALTH CARE EDUCATION/TRAINING PROGRAM

## 2017-01-01 PROCEDURE — 25000003 PHARM REV CODE 250: Performed by: ANESTHESIOLOGY

## 2017-01-01 PROCEDURE — 70551 MRI BRAIN STEM W/O DYE: CPT | Mod: TC,PO

## 2017-01-01 PROCEDURE — 99999 PR PBB SHADOW E&M-EST. PATIENT-LVL IV: CPT | Mod: PBBFAC,,, | Performed by: INTERNAL MEDICINE

## 2017-01-01 PROCEDURE — 99213 OFFICE O/P EST LOW 20 MIN: CPT | Mod: S$GLB,,, | Performed by: FAMILY MEDICINE

## 2017-01-01 PROCEDURE — 25000003 PHARM REV CODE 250: Performed by: EMERGENCY MEDICINE

## 2017-01-01 PROCEDURE — 25000003 PHARM REV CODE 250: Performed by: HOSPITALIST

## 2017-01-01 PROCEDURE — 99214 OFFICE O/P EST MOD 30 MIN: CPT | Mod: S$PBB,,, | Performed by: INTERNAL MEDICINE

## 2017-01-01 PROCEDURE — 85730 THROMBOPLASTIN TIME PARTIAL: CPT

## 2017-01-01 PROCEDURE — 81000 URINALYSIS NONAUTO W/SCOPE: CPT

## 2017-01-01 PROCEDURE — 82948 REAGENT STRIP/BLOOD GLUCOSE: CPT | Mod: ,,, | Performed by: FAMILY MEDICINE

## 2017-01-01 PROCEDURE — 99291 CRITICAL CARE FIRST HOUR: CPT | Mod: 25

## 2017-01-01 PROCEDURE — 25000003 PHARM REV CODE 250: Performed by: SURGERY

## 2017-01-01 PROCEDURE — C9113 INJ PANTOPRAZOLE SODIUM, VIA: HCPCS | Performed by: EMERGENCY MEDICINE

## 2017-01-01 PROCEDURE — 86920 COMPATIBILITY TEST SPIN: CPT

## 2017-01-01 PROCEDURE — 94003 VENT MGMT INPAT SUBQ DAY: CPT

## 2017-01-01 PROCEDURE — 74176 CT ABD & PELVIS W/O CONTRAST: CPT | Mod: TC,PO

## 2017-01-01 PROCEDURE — 85018 HEMOGLOBIN: CPT | Mod: 91

## 2017-01-01 PROCEDURE — 96375 TX/PRO/DX INJ NEW DRUG ADDON: CPT

## 2017-01-01 PROCEDURE — 25000003 PHARM REV CODE 250: Performed by: NURSE ANESTHETIST, CERTIFIED REGISTERED

## 2017-01-01 PROCEDURE — 88305 TISSUE EXAM BY PATHOLOGIST: CPT | Performed by: PATHOLOGY

## 2017-01-01 PROCEDURE — 93005 ELECTROCARDIOGRAM TRACING: CPT

## 2017-01-01 PROCEDURE — 63600175 PHARM REV CODE 636 W HCPCS: Performed by: SURGERY

## 2017-01-01 PROCEDURE — 83036 HEMOGLOBIN GLYCOSYLATED A1C: CPT | Mod: PO

## 2017-01-01 PROCEDURE — S0030 INJECTION, METRONIDAZOLE: HCPCS | Performed by: INTERNAL MEDICINE

## 2017-01-01 PROCEDURE — 86850 RBC ANTIBODY SCREEN: CPT

## 2017-01-01 PROCEDURE — 4010F ACE/ARB THERAPY RXD/TAKEN: CPT | Mod: S$GLB,,, | Performed by: FAMILY MEDICINE

## 2017-01-01 PROCEDURE — 99900035 HC TECH TIME PER 15 MIN (STAT)

## 2017-01-01 PROCEDURE — 86900 BLOOD TYPING SEROLOGIC ABO: CPT

## 2017-01-01 PROCEDURE — 3074F SYST BP LT 130 MM HG: CPT | Mod: S$GLB,,, | Performed by: FAMILY MEDICINE

## 2017-01-01 PROCEDURE — 3075F SYST BP GE 130 - 139MM HG: CPT | Mod: ,,, | Performed by: INTERNAL MEDICINE

## 2017-01-01 PROCEDURE — 82803 BLOOD GASES ANY COMBINATION: CPT

## 2017-01-01 PROCEDURE — 27201423 OPTIME MED/SURG SUP & DEVICES STERILE SUPPLY: Performed by: STUDENT IN AN ORGANIZED HEALTH CARE EDUCATION/TRAINING PROGRAM

## 2017-01-01 PROCEDURE — 49000 EXPLORATION OF ABDOMEN: CPT | Mod: ,,, | Performed by: STUDENT IN AN ORGANIZED HEALTH CARE EDUCATION/TRAINING PROGRAM

## 2017-01-01 PROCEDURE — 87449 NOS EACH ORGANISM AG IA: CPT

## 2017-01-01 PROCEDURE — 63600175 PHARM REV CODE 636 W HCPCS: Performed by: PHYSICIAN ASSISTANT

## 2017-01-01 PROCEDURE — 25000003 PHARM REV CODE 250: Performed by: STUDENT IN AN ORGANIZED HEALTH CARE EDUCATION/TRAINING PROGRAM

## 2017-01-01 PROCEDURE — 37000008 HC ANESTHESIA 1ST 15 MINUTES: Performed by: INTERNAL MEDICINE

## 2017-01-01 PROCEDURE — 99213 OFFICE O/P EST LOW 20 MIN: CPT | Mod: PBBFAC,PN | Performed by: INTERNAL MEDICINE

## 2017-01-01 PROCEDURE — 37799 UNLISTED PX VASCULAR SURGERY: CPT

## 2017-01-01 PROCEDURE — 87427 SHIGA-LIKE TOXIN AG IA: CPT | Mod: 59

## 2017-01-01 PROCEDURE — 86901 BLOOD TYPING SEROLOGIC RH(D): CPT

## 2017-01-01 PROCEDURE — 43239 EGD BIOPSY SINGLE/MULTIPLE: CPT | Performed by: INTERNAL MEDICINE

## 2017-01-01 PROCEDURE — 99213 OFFICE O/P EST LOW 20 MIN: CPT | Mod: S$GLB,,, | Performed by: NURSE PRACTITIONER

## 2017-01-01 PROCEDURE — 85014 HEMATOCRIT: CPT

## 2017-01-01 PROCEDURE — 71020 XR CHEST PA AND LATERAL: CPT | Mod: TC

## 2017-01-01 PROCEDURE — 94002 VENT MGMT INPAT INIT DAY: CPT

## 2017-01-01 PROCEDURE — 83540 ASSAY OF IRON: CPT

## 2017-01-01 PROCEDURE — C9113 INJ PANTOPRAZOLE SODIUM, VIA: HCPCS | Performed by: SURGERY

## 2017-01-01 PROCEDURE — 27201012 HC FORCEPS, HOT/COLD, DISP: Performed by: INTERNAL MEDICINE

## 2017-01-01 PROCEDURE — 78452 HT MUSCLE IMAGE SPECT MULT: CPT | Mod: TC,PO

## 2017-01-01 PROCEDURE — 36000707: Performed by: STUDENT IN AN ORGANIZED HEALTH CARE EDUCATION/TRAINING PROGRAM

## 2017-01-01 PROCEDURE — 83690 ASSAY OF LIPASE: CPT

## 2017-01-01 PROCEDURE — 94760 N-INVAS EAR/PLS OXIMETRY 1: CPT

## 2017-01-01 PROCEDURE — 3079F DIAST BP 80-89 MM HG: CPT | Mod: S$GLB,,, | Performed by: FAMILY MEDICINE

## 2017-01-01 PROCEDURE — 96374 THER/PROPH/DIAG INJ IV PUSH: CPT

## 2017-01-01 PROCEDURE — 82728 ASSAY OF FERRITIN: CPT

## 2017-01-01 PROCEDURE — P9045 ALBUMIN (HUMAN), 5%, 250 ML: HCPCS | Performed by: NURSE ANESTHETIST, CERTIFIED REGISTERED

## 2017-01-01 PROCEDURE — 37000008 HC ANESTHESIA 1ST 15 MINUTES: Performed by: STUDENT IN AN ORGANIZED HEALTH CARE EDUCATION/TRAINING PROGRAM

## 2017-01-01 PROCEDURE — 99223 1ST HOSP IP/OBS HIGH 75: CPT | Mod: ,,, | Performed by: INTERNAL MEDICINE

## 2017-01-01 PROCEDURE — 93010 ELECTROCARDIOGRAM REPORT: CPT | Mod: ,,, | Performed by: INTERNAL MEDICINE

## 2017-01-01 PROCEDURE — 88305 TISSUE EXAM BY PATHOLOGIST: CPT | Mod: 26,,, | Performed by: PATHOLOGY

## 2017-01-01 PROCEDURE — 85384 FIBRINOGEN ACTIVITY: CPT

## 2017-01-01 PROCEDURE — 3044F HG A1C LEVEL LT 7.0%: CPT | Mod: S$GLB,,, | Performed by: FAMILY MEDICINE

## 2017-01-01 PROCEDURE — 0DB68ZX EXCISION OF STOMACH, VIA NATURAL OR ARTIFICIAL OPENING ENDOSCOPIC, DIAGNOSTIC: ICD-10-PCS | Performed by: INTERNAL MEDICINE

## 2017-01-01 PROCEDURE — 99284 EMERGENCY DEPT VISIT MOD MDM: CPT | Mod: 25

## 2017-01-01 RX ORDER — ONDANSETRON 2 MG/ML
4 INJECTION INTRAMUSCULAR; INTRAVENOUS EVERY 12 HOURS PRN
Status: DISCONTINUED | OUTPATIENT
Start: 2017-01-01 | End: 2017-01-01 | Stop reason: HOSPADM

## 2017-01-01 RX ORDER — PROPOFOL 10 MG/ML
VIAL (ML) INTRAVENOUS
Status: DISCONTINUED | OUTPATIENT
Start: 2017-01-01 | End: 2017-01-01

## 2017-01-01 RX ORDER — ONDANSETRON 8 MG/1
8 TABLET, ORALLY DISINTEGRATING ORAL EVERY 8 HOURS PRN
Status: DISCONTINUED | OUTPATIENT
Start: 2017-01-01 | End: 2017-01-01 | Stop reason: HOSPADM

## 2017-01-01 RX ORDER — HYDROMORPHONE HYDROCHLORIDE 2 MG/ML
0.4 INJECTION, SOLUTION INTRAMUSCULAR; INTRAVENOUS; SUBCUTANEOUS EVERY 5 MIN PRN
Status: ACTIVE | OUTPATIENT
Start: 2017-01-01 | End: 2017-01-01

## 2017-01-01 RX ORDER — BACITRACIN 50000 [IU]/1
INJECTION, POWDER, FOR SOLUTION INTRAMUSCULAR
Status: DISCONTINUED | OUTPATIENT
Start: 2017-01-01 | End: 2017-01-01 | Stop reason: HOSPADM

## 2017-01-01 RX ORDER — SIMVASTATIN 40 MG/1
40 TABLET, FILM COATED ORAL DAILY
Status: DISCONTINUED | OUTPATIENT
Start: 2017-01-01 | End: 2017-01-01 | Stop reason: HOSPADM

## 2017-01-01 RX ORDER — SODIUM CHLORIDE 0.9 % (FLUSH) 0.9 %
5 SYRINGE (ML) INJECTION
Status: DISCONTINUED | OUTPATIENT
Start: 2017-01-01 | End: 2017-01-01 | Stop reason: HOSPADM

## 2017-01-01 RX ORDER — PANTOPRAZOLE SODIUM 40 MG/10ML
40 INJECTION, POWDER, LYOPHILIZED, FOR SOLUTION INTRAVENOUS
Status: COMPLETED | OUTPATIENT
Start: 2017-01-01 | End: 2017-01-01

## 2017-01-01 RX ORDER — LEVOTHYROXINE SODIUM 88 UG/1
TABLET ORAL
Qty: 90 TABLET | Refills: 3 | Status: SHIPPED | OUTPATIENT
Start: 2017-01-01 | End: 2017-01-01 | Stop reason: SDUPTHER

## 2017-01-01 RX ORDER — INDOMETHACIN 25 MG/1
100 CAPSULE ORAL ONCE
Status: COMPLETED | OUTPATIENT
Start: 2017-01-01 | End: 2017-01-01

## 2017-01-01 RX ORDER — METOPROLOL TARTRATE 25 MG/1
TABLET, FILM COATED ORAL
Qty: 180 TABLET | Refills: 0 | Status: SHIPPED | OUTPATIENT
Start: 2017-01-01 | End: 2017-01-01 | Stop reason: SDUPTHER

## 2017-01-01 RX ORDER — OLANZAPINE 2.5 MG/1
5 TABLET ORAL DAILY
Status: DISCONTINUED | OUTPATIENT
Start: 2017-01-01 | End: 2017-01-01 | Stop reason: HOSPADM

## 2017-01-01 RX ORDER — INSULIN ASPART 100 [IU]/ML
0-5 INJECTION, SOLUTION INTRAVENOUS; SUBCUTANEOUS
Status: DISCONTINUED | OUTPATIENT
Start: 2017-01-01 | End: 2017-01-01 | Stop reason: HOSPADM

## 2017-01-01 RX ORDER — SODIUM CHLORIDE 9 MG/ML
1000 INJECTION, SOLUTION INTRAVENOUS
Status: COMPLETED | OUTPATIENT
Start: 2017-01-01 | End: 2017-01-01

## 2017-01-01 RX ORDER — METRONIDAZOLE 500 MG/1
500 TABLET ORAL EVERY 8 HOURS
Qty: 42 TABLET | Refills: 0 | Status: SHIPPED | OUTPATIENT
Start: 2017-01-01 | End: 2017-01-01

## 2017-01-01 RX ORDER — ONDANSETRON 2 MG/ML
4 INJECTION INTRAMUSCULAR; INTRAVENOUS
Status: COMPLETED | OUTPATIENT
Start: 2017-01-01 | End: 2017-01-01

## 2017-01-01 RX ORDER — FERROUS SULFATE 325(65) MG
325 TABLET, DELAYED RELEASE (ENTERIC COATED) ORAL 2 TIMES DAILY
Status: DISCONTINUED | OUTPATIENT
Start: 2017-01-01 | End: 2017-01-01 | Stop reason: HOSPADM

## 2017-01-01 RX ORDER — NITROGLYCERIN 0.4 MG/1
0.4 TABLET SUBLINGUAL EVERY 5 MIN PRN
Status: DISCONTINUED | OUTPATIENT
Start: 2017-01-01 | End: 2017-01-01 | Stop reason: HOSPADM

## 2017-01-01 RX ORDER — FUROSEMIDE 40 MG/1
TABLET ORAL
Qty: 30 TABLET | Refills: 5 | Status: SHIPPED | OUTPATIENT
Start: 2017-01-01 | End: 2017-01-01

## 2017-01-01 RX ORDER — GABAPENTIN 300 MG/1
300 CAPSULE ORAL NIGHTLY
Status: DISCONTINUED | OUTPATIENT
Start: 2017-01-01 | End: 2017-01-01 | Stop reason: HOSPADM

## 2017-01-01 RX ORDER — IBUPROFEN 200 MG
16 TABLET ORAL
Status: DISCONTINUED | OUTPATIENT
Start: 2017-01-01 | End: 2017-01-01 | Stop reason: HOSPADM

## 2017-01-01 RX ORDER — HYDROCODONE BITARTRATE AND ACETAMINOPHEN 5; 325 MG/1; MG/1
1 TABLET ORAL EVERY 6 HOURS PRN
Status: DISCONTINUED | OUTPATIENT
Start: 2017-01-01 | End: 2017-01-01 | Stop reason: HOSPADM

## 2017-01-01 RX ORDER — MORPHINE SULFATE 2 MG/ML
2 INJECTION, SOLUTION INTRAMUSCULAR; INTRAVENOUS
Status: COMPLETED | OUTPATIENT
Start: 2017-01-01 | End: 2017-01-01

## 2017-01-01 RX ORDER — ALBUMIN HUMAN 50 G/1000ML
SOLUTION INTRAVENOUS CONTINUOUS PRN
Status: DISCONTINUED | OUTPATIENT
Start: 2017-01-01 | End: 2017-01-01

## 2017-01-01 RX ORDER — LISINOPRIL 40 MG/1
TABLET ORAL
Qty: 180 TABLET | Refills: 0 | Status: SHIPPED | OUTPATIENT
Start: 2017-01-01 | End: 2017-01-01 | Stop reason: SDUPTHER

## 2017-01-01 RX ORDER — MIDAZOLAM HYDROCHLORIDE 1 MG/ML
INJECTION, SOLUTION INTRAMUSCULAR; INTRAVENOUS
Status: DISCONTINUED | OUTPATIENT
Start: 2017-01-01 | End: 2017-01-01

## 2017-01-01 RX ORDER — METRONIDAZOLE 500 MG/100ML
500 INJECTION, SOLUTION INTRAVENOUS
Status: DISCONTINUED | OUTPATIENT
Start: 2017-01-01 | End: 2017-01-01 | Stop reason: HOSPADM

## 2017-01-01 RX ORDER — PANTOPRAZOLE SODIUM 40 MG/10ML
80 INJECTION, POWDER, LYOPHILIZED, FOR SOLUTION INTRAVENOUS
Status: COMPLETED | OUTPATIENT
Start: 2017-01-01 | End: 2017-01-01

## 2017-01-01 RX ORDER — SODIUM CHLORIDE 9 MG/ML
INJECTION, SOLUTION INTRAVENOUS CONTINUOUS
Status: DISCONTINUED | OUTPATIENT
Start: 2017-01-01 | End: 2017-01-01

## 2017-01-01 RX ORDER — LIDOCAINE HYDROCHLORIDE 10 MG/ML
INJECTION, SOLUTION EPIDURAL; INFILTRATION; INTRACAUDAL; PERINEURAL
Status: DISCONTINUED | OUTPATIENT
Start: 2017-01-01 | End: 2017-01-01 | Stop reason: HOSPADM

## 2017-01-01 RX ORDER — RAMELTEON 8 MG/1
8 TABLET ORAL NIGHTLY PRN
Status: DISCONTINUED | OUTPATIENT
Start: 2017-01-01 | End: 2017-01-01 | Stop reason: HOSPADM

## 2017-01-01 RX ORDER — HYOSCYAMINE SULFATE 0.5 MG/ML
0.5 INJECTION, SOLUTION SUBCUTANEOUS
Status: DISCONTINUED | OUTPATIENT
Start: 2017-01-01 | End: 2017-01-01

## 2017-01-01 RX ORDER — HYDROCODONE BITARTRATE AND ACETAMINOPHEN 500; 5 MG/1; MG/1
TABLET ORAL
Status: DISCONTINUED | OUTPATIENT
Start: 2017-01-01 | End: 2017-01-01 | Stop reason: HOSPADM

## 2017-01-01 RX ORDER — CIPROFLOXACIN 2 MG/ML
400 INJECTION, SOLUTION INTRAVENOUS
Status: DISCONTINUED | OUTPATIENT
Start: 2017-01-01 | End: 2017-01-01

## 2017-01-01 RX ORDER — REGADENOSON 0.08 MG/ML
INJECTION, SOLUTION INTRAVENOUS
Status: DISPENSED
Start: 2017-01-01 | End: 2017-01-01

## 2017-01-01 RX ORDER — PANTOPRAZOLE SODIUM 40 MG/10ML
40 INJECTION, POWDER, LYOPHILIZED, FOR SOLUTION INTRAVENOUS DAILY
Status: DISCONTINUED | OUTPATIENT
Start: 2017-01-01 | End: 2017-01-01 | Stop reason: HOSPADM

## 2017-01-01 RX ORDER — PANTOPRAZOLE SODIUM 40 MG/1
40 TABLET, DELAYED RELEASE ORAL DAILY
Status: DISCONTINUED | OUTPATIENT
Start: 2017-01-01 | End: 2017-01-01 | Stop reason: HOSPADM

## 2017-01-01 RX ORDER — CEFEPIME HYDROCHLORIDE 1 G/50ML
1 INJECTION, SOLUTION INTRAVENOUS DAILY
Status: DISCONTINUED | OUTPATIENT
Start: 2017-01-01 | End: 2017-01-01

## 2017-01-01 RX ORDER — SIMVASTATIN 40 MG/1
TABLET, FILM COATED ORAL
Qty: 90 TABLET | Refills: 3 | Status: ON HOLD | OUTPATIENT
Start: 2017-01-01 | End: 2017-01-01 | Stop reason: HOSPADM

## 2017-01-01 RX ORDER — OLANZAPINE 2.5 MG/1
5 TABLET ORAL NIGHTLY
Status: DISCONTINUED | OUTPATIENT
Start: 2017-01-01 | End: 2017-01-01

## 2017-01-01 RX ORDER — CIPROFLOXACIN 500 MG/1
500 TABLET ORAL EVERY 24 HOURS
Status: CANCELLED | OUTPATIENT
Start: 2017-01-01

## 2017-01-01 RX ORDER — BENZONATATE 200 MG/1
200 CAPSULE ORAL 3 TIMES DAILY PRN
Qty: 30 CAPSULE | Refills: 0 | Status: SHIPPED | OUTPATIENT
Start: 2017-01-01 | End: 2017-01-01

## 2017-01-01 RX ORDER — IBUPROFEN 200 MG
24 TABLET ORAL
Status: DISCONTINUED | OUTPATIENT
Start: 2017-01-01 | End: 2017-01-01 | Stop reason: HOSPADM

## 2017-01-01 RX ORDER — ALENDRONATE SODIUM 70 MG/1
TABLET ORAL
Qty: 4 TABLET | Refills: 0 | Status: SHIPPED | OUTPATIENT
Start: 2017-01-01 | End: 2017-01-01 | Stop reason: SDUPTHER

## 2017-01-01 RX ORDER — ALPRAZOLAM 0.25 MG/1
0.5 TABLET ORAL 2 TIMES DAILY
Status: DISCONTINUED | OUTPATIENT
Start: 2017-01-01 | End: 2017-01-01

## 2017-01-01 RX ORDER — SIMVASTATIN 40 MG/1
40 TABLET, FILM COATED ORAL NIGHTLY
Status: DISCONTINUED | OUTPATIENT
Start: 2017-01-01 | End: 2017-01-01 | Stop reason: HOSPADM

## 2017-01-01 RX ORDER — ALPRAZOLAM 0.5 MG/1
TABLET ORAL
Qty: 60 TABLET | Refills: 0 | Status: ON HOLD | OUTPATIENT
Start: 2017-01-01 | End: 2017-01-01 | Stop reason: HOSPADM

## 2017-01-01 RX ORDER — METOPROLOL TARTRATE 25 MG/1
12.5 TABLET ORAL 2 TIMES DAILY
Status: DISCONTINUED | OUTPATIENT
Start: 2017-01-01 | End: 2017-01-01 | Stop reason: HOSPADM

## 2017-01-01 RX ORDER — ALPRAZOLAM 0.25 MG/1
0.5 TABLET ORAL 2 TIMES DAILY
Status: DISCONTINUED | OUTPATIENT
Start: 2017-01-01 | End: 2017-01-01 | Stop reason: HOSPADM

## 2017-01-01 RX ORDER — PAROXETINE HYDROCHLORIDE 40 MG/1
TABLET, FILM COATED ORAL
Qty: 45 TABLET | Refills: 5 | Status: SHIPPED | OUTPATIENT
Start: 2017-01-01 | End: 2017-01-01

## 2017-01-01 RX ORDER — PANTOPRAZOLE SODIUM 40 MG/1
40 TABLET, DELAYED RELEASE ORAL DAILY
Status: DISCONTINUED | OUTPATIENT
Start: 2017-01-01 | End: 2017-01-01

## 2017-01-01 RX ORDER — HYDROCHLOROTHIAZIDE 12.5 MG/1
CAPSULE ORAL
Qty: 90 CAPSULE | Refills: 0 | Status: SHIPPED | OUTPATIENT
Start: 2017-01-01 | End: 2017-01-01

## 2017-01-01 RX ORDER — LISINOPRIL 10 MG/1
10 TABLET ORAL DAILY
Status: DISCONTINUED | OUTPATIENT
Start: 2017-01-01 | End: 2017-01-01 | Stop reason: HOSPADM

## 2017-01-01 RX ORDER — LANCETS
1 EACH MISCELLANEOUS
Qty: 300 EACH | Refills: 11 | Status: ON HOLD | OUTPATIENT
Start: 2017-01-01 | End: 2017-01-01 | Stop reason: HOSPADM

## 2017-01-01 RX ORDER — HYDROMORPHONE HYDROCHLORIDE 1 MG/ML
1 INJECTION, SOLUTION INTRAMUSCULAR; INTRAVENOUS; SUBCUTANEOUS EVERY 4 HOURS PRN
Status: DISCONTINUED | OUTPATIENT
Start: 2017-01-01 | End: 2017-01-01

## 2017-01-01 RX ORDER — LEVOTHYROXINE SODIUM 88 UG/1
88 TABLET ORAL DAILY
Status: DISCONTINUED | OUTPATIENT
Start: 2017-01-01 | End: 2017-01-01 | Stop reason: HOSPADM

## 2017-01-01 RX ORDER — METOPROLOL TARTRATE 25 MG/1
25 TABLET, FILM COATED ORAL 2 TIMES DAILY
Status: DISCONTINUED | OUTPATIENT
Start: 2017-01-01 | End: 2017-01-01 | Stop reason: HOSPADM

## 2017-01-01 RX ORDER — HYDROMORPHONE HYDROCHLORIDE 2 MG/ML
0.2 INJECTION, SOLUTION INTRAMUSCULAR; INTRAVENOUS; SUBCUTANEOUS EVERY 5 MIN PRN
Status: ACTIVE | OUTPATIENT
Start: 2017-01-01 | End: 2017-01-01

## 2017-01-01 RX ORDER — OXYCODONE AND ACETAMINOPHEN 7.5; 325 MG/1; MG/1
TABLET ORAL
Refills: 0 | COMMUNITY
Start: 2017-01-01 | End: 2017-01-01

## 2017-01-01 RX ORDER — VENLAFAXINE HYDROCHLORIDE 75 MG/1
75 CAPSULE, EXTENDED RELEASE ORAL DAILY
Status: DISCONTINUED | OUTPATIENT
Start: 2017-01-01 | End: 2017-01-01 | Stop reason: HOSPADM

## 2017-01-01 RX ORDER — NOREPINEPHRINE BITARTRATE/D5W 16MG/250ML
1 PLASTIC BAG, INJECTION (ML) INTRAVENOUS CONTINUOUS
Status: DISCONTINUED | OUTPATIENT
Start: 2017-01-01 | End: 2017-01-01

## 2017-01-01 RX ORDER — CEFEPIME HYDROCHLORIDE 1 G/50ML
1 INJECTION, SOLUTION INTRAVENOUS
Status: DISCONTINUED | OUTPATIENT
Start: 2017-01-01 | End: 2017-01-01 | Stop reason: HOSPADM

## 2017-01-01 RX ORDER — NAPROXEN SODIUM 220 MG/1
81 TABLET, FILM COATED ORAL DAILY
Status: DISCONTINUED | OUTPATIENT
Start: 2017-01-01 | End: 2017-01-01 | Stop reason: HOSPADM

## 2017-01-01 RX ORDER — METRONIDAZOLE 500 MG/100ML
500 INJECTION, SOLUTION INTRAVENOUS
Status: DISCONTINUED | OUTPATIENT
Start: 2017-01-01 | End: 2017-01-01

## 2017-01-01 RX ORDER — IBUPROFEN 200 MG
1 TABLET ORAL DAILY PRN
Status: DISCONTINUED | OUTPATIENT
Start: 2017-01-01 | End: 2017-01-01 | Stop reason: HOSPADM

## 2017-01-01 RX ORDER — CIPROFLOXACIN 500 MG/1
500 TABLET ORAL 2 TIMES DAILY
Qty: 14 TABLET | Refills: 0 | Status: SHIPPED | OUTPATIENT
Start: 2017-01-01 | End: 2017-01-01

## 2017-01-01 RX ORDER — FENTANYL CITRATE 50 UG/ML
INJECTION, SOLUTION INTRAMUSCULAR; INTRAVENOUS
Status: DISCONTINUED | OUTPATIENT
Start: 2017-01-01 | End: 2017-01-01

## 2017-01-01 RX ORDER — DOCUSATE SODIUM 100 MG/1
100 CAPSULE, LIQUID FILLED ORAL 2 TIMES DAILY
Qty: 14 CAPSULE | Refills: 0 | Status: SHIPPED | OUTPATIENT
Start: 2017-01-01 | End: 2017-01-01

## 2017-01-01 RX ORDER — GLUCAGON 1 MG
1 KIT INJECTION
Status: DISCONTINUED | OUTPATIENT
Start: 2017-01-01 | End: 2017-01-01 | Stop reason: HOSPADM

## 2017-01-01 RX ORDER — FUROSEMIDE 20 MG/1
20 TABLET ORAL DAILY
Status: DISCONTINUED | OUTPATIENT
Start: 2017-01-01 | End: 2017-01-01 | Stop reason: HOSPADM

## 2017-01-01 RX ORDER — SODIUM CHLORIDE 9 MG/ML
INJECTION, SOLUTION INTRAVENOUS CONTINUOUS
Status: DISCONTINUED | OUTPATIENT
Start: 2017-01-01 | End: 2017-01-01 | Stop reason: HOSPADM

## 2017-01-01 RX ORDER — DEXMEDETOMIDINE HYDROCHLORIDE 4 UG/ML
0.3 INJECTION, SOLUTION INTRAVENOUS CONTINUOUS
Status: DISCONTINUED | OUTPATIENT
Start: 2017-01-01 | End: 2017-01-01 | Stop reason: HOSPADM

## 2017-01-01 RX ORDER — GLIMEPIRIDE 4 MG/1
4 TABLET ORAL
Qty: 90 TABLET | Refills: 3 | Status: ON HOLD | OUTPATIENT
Start: 2017-01-01 | End: 2017-01-01 | Stop reason: HOSPADM

## 2017-01-01 RX ORDER — VENLAFAXINE HYDROCHLORIDE 150 MG/1
150 CAPSULE, EXTENDED RELEASE ORAL DAILY
Qty: 30 CAPSULE | Refills: 11 | Status: ON HOLD | OUTPATIENT
Start: 2017-01-01 | End: 2017-01-01 | Stop reason: HOSPADM

## 2017-01-01 RX ORDER — ALPRAZOLAM 0.5 MG/1
0.5 TABLET ORAL 2 TIMES DAILY
Qty: 60 TABLET | Refills: 2 | Status: SHIPPED | OUTPATIENT
Start: 2017-01-01 | End: 2017-01-01 | Stop reason: SDUPTHER

## 2017-01-01 RX ORDER — FAMOTIDINE 20 MG/1
20 TABLET, FILM COATED ORAL 2 TIMES DAILY
Status: DISCONTINUED | OUTPATIENT
Start: 2017-01-01 | End: 2017-01-01 | Stop reason: HOSPADM

## 2017-01-01 RX ORDER — METRONIDAZOLE 500 MG/1
500 TABLET ORAL EVERY 8 HOURS
Status: DISCONTINUED | OUTPATIENT
Start: 2017-01-01 | End: 2017-01-01 | Stop reason: HOSPADM

## 2017-01-01 RX ORDER — PANTOPRAZOLE SODIUM 40 MG/1
40 TABLET, DELAYED RELEASE ORAL DAILY
Qty: 30 TABLET | Refills: 5 | Status: ON HOLD | OUTPATIENT
Start: 2017-01-01 | End: 2017-01-01 | Stop reason: HOSPADM

## 2017-01-01 RX ORDER — ACETAMINOPHEN 325 MG/1
650 TABLET ORAL EVERY 6 HOURS PRN
Status: DISCONTINUED | OUTPATIENT
Start: 2017-01-01 | End: 2017-01-01 | Stop reason: HOSPADM

## 2017-01-01 RX ORDER — LISINOPRIL 40 MG/1
TABLET ORAL
Qty: 180 TABLET | Refills: 3 | Status: ON HOLD | OUTPATIENT
Start: 2017-01-01 | End: 2017-01-01 | Stop reason: HOSPADM

## 2017-01-01 RX ORDER — METFORMIN HYDROCHLORIDE 500 MG/1
1000 TABLET ORAL 2 TIMES DAILY
Qty: 360 TABLET | Refills: 3 | Status: ON HOLD | OUTPATIENT
Start: 2017-01-01 | End: 2017-01-01 | Stop reason: HOSPADM

## 2017-01-01 RX ORDER — LIDOCAINE HCL/PF 100 MG/5ML
SYRINGE (ML) INTRAVENOUS
Status: DISCONTINUED | OUTPATIENT
Start: 2017-01-01 | End: 2017-01-01

## 2017-01-01 RX ORDER — FENTANYL CITRATE 50 UG/ML
25 INJECTION, SOLUTION INTRAMUSCULAR; INTRAVENOUS
Status: COMPLETED | OUTPATIENT
Start: 2017-01-01 | End: 2017-01-01

## 2017-01-01 RX ORDER — VENLAFAXINE HYDROCHLORIDE 75 MG/1
CAPSULE, EXTENDED RELEASE ORAL
Qty: 30 CAPSULE | Refills: 5 | Status: SHIPPED | OUTPATIENT
Start: 2017-01-01 | End: 2017-01-01 | Stop reason: SDUPTHER

## 2017-01-01 RX ORDER — SODIUM CHLORIDE 9 MG/ML
INJECTION, SOLUTION INTRAVENOUS CONTINUOUS PRN
Status: DISCONTINUED | OUTPATIENT
Start: 2017-01-01 | End: 2017-01-01

## 2017-01-01 RX ORDER — MORPHINE SULFATE 2 MG/ML
4 INJECTION, SOLUTION INTRAMUSCULAR; INTRAVENOUS
Status: COMPLETED | OUTPATIENT
Start: 2017-01-01 | End: 2017-01-01

## 2017-01-01 RX ORDER — DAPAGLIFLOZIN 5 MG/1
5 TABLET, FILM COATED ORAL DAILY
Qty: 30 TABLET | Refills: 11 | Status: SHIPPED | OUTPATIENT
Start: 2017-01-01 | End: 2017-01-01 | Stop reason: SINTOL

## 2017-01-01 RX ORDER — MAGNESIUM SULFATE HEPTAHYDRATE 40 MG/ML
2 INJECTION, SOLUTION INTRAVENOUS
Status: COMPLETED | OUTPATIENT
Start: 2017-01-01 | End: 2017-01-01

## 2017-01-01 RX ORDER — FAMOTIDINE 10 MG/ML
20 INJECTION INTRAVENOUS DAILY
Status: DISCONTINUED | OUTPATIENT
Start: 2017-01-01 | End: 2017-01-01

## 2017-01-01 RX ORDER — SODIUM BICARBONATE 1 MEQ/ML
SYRINGE (ML) INTRAVENOUS
Status: DISCONTINUED | OUTPATIENT
Start: 2017-01-01 | End: 2017-01-01

## 2017-01-01 RX ORDER — LISINOPRIL 40 MG/1
TABLET ORAL
Qty: 180 TABLET | Refills: 3 | Status: ON HOLD | OUTPATIENT
Start: 2017-01-01 | End: 2017-01-01

## 2017-01-01 RX ORDER — ONDANSETRON 2 MG/ML
4 INJECTION INTRAMUSCULAR; INTRAVENOUS DAILY PRN
Status: DISCONTINUED | OUTPATIENT
Start: 2017-01-01 | End: 2017-01-01 | Stop reason: HOSPADM

## 2017-01-01 RX ORDER — DICYCLOMINE HYDROCHLORIDE 20 MG/1
20 TABLET ORAL 2 TIMES DAILY
Status: DISCONTINUED | OUTPATIENT
Start: 2017-01-01 | End: 2017-01-01 | Stop reason: HOSPADM

## 2017-01-01 RX ORDER — ALENDRONATE SODIUM 70 MG/1
TABLET ORAL
Qty: 4 TABLET | Refills: 11 | Status: ON HOLD | OUTPATIENT
Start: 2017-01-01 | End: 2017-01-01 | Stop reason: HOSPADM

## 2017-01-01 RX ORDER — CLOPIDOGREL BISULFATE 75 MG/1
75 TABLET ORAL DAILY
Status: DISCONTINUED | OUTPATIENT
Start: 2017-01-01 | End: 2017-01-01

## 2017-01-01 RX ORDER — METOCLOPRAMIDE HYDROCHLORIDE 5 MG/ML
10 INJECTION INTRAMUSCULAR; INTRAVENOUS
Status: COMPLETED | OUTPATIENT
Start: 2017-01-01 | End: 2017-01-01

## 2017-01-01 RX ORDER — METFORMIN HYDROCHLORIDE 500 MG/1
TABLET ORAL
Qty: 360 TABLET | Refills: 0 | Status: SHIPPED | OUTPATIENT
Start: 2017-01-01 | End: 2017-01-01 | Stop reason: SDUPTHER

## 2017-01-01 RX ORDER — DICYCLOMINE HYDROCHLORIDE 20 MG/1
20 TABLET ORAL 2 TIMES DAILY
Qty: 20 TABLET | Refills: 0 | Status: SHIPPED | OUTPATIENT
Start: 2017-01-01 | End: 2017-01-01

## 2017-01-01 RX ORDER — METRONIDAZOLE 500 MG/1
500 TABLET ORAL EVERY 8 HOURS
Status: DISCONTINUED | OUTPATIENT
Start: 2017-01-01 | End: 2017-01-01

## 2017-01-01 RX ORDER — NOREPINEPHRINE BITARTRATE/D5W 16MG/250ML
PLASTIC BAG, INJECTION (ML) INTRAVENOUS
Status: DISPENSED
Start: 2017-01-01 | End: 2017-01-01

## 2017-01-01 RX ORDER — ROCURONIUM BROMIDE 10 MG/ML
INJECTION, SOLUTION INTRAVENOUS
Status: DISCONTINUED | OUTPATIENT
Start: 2017-01-01 | End: 2017-01-01

## 2017-01-01 RX ORDER — SODIUM CHLORIDE 9 MG/ML
125 INJECTION, SOLUTION INTRAVENOUS CONTINUOUS
Status: DISCONTINUED | OUTPATIENT
Start: 2017-01-01 | End: 2017-01-01

## 2017-01-01 RX ORDER — MORPHINE SULFATE 2 MG/ML
2 INJECTION, SOLUTION INTRAMUSCULAR; INTRAVENOUS EVERY 4 HOURS PRN
Status: DISCONTINUED | OUTPATIENT
Start: 2017-01-01 | End: 2017-01-01

## 2017-01-01 RX ORDER — SODIUM BICARBONATE 42 MG/ML
100 INJECTION, SOLUTION INTRAVENOUS ONCE
Status: DISCONTINUED | OUTPATIENT
Start: 2017-01-01 | End: 2017-01-01

## 2017-01-01 RX ORDER — LEVOTHYROXINE SODIUM 88 UG/1
88 TABLET ORAL
Status: DISCONTINUED | OUTPATIENT
Start: 2017-01-01 | End: 2017-01-01

## 2017-01-01 RX ORDER — HYDRALAZINE HYDROCHLORIDE 20 MG/ML
10 INJECTION INTRAMUSCULAR; INTRAVENOUS EVERY 8 HOURS PRN
Status: DISCONTINUED | OUTPATIENT
Start: 2017-01-01 | End: 2017-01-01 | Stop reason: HOSPADM

## 2017-01-01 RX ORDER — AZITHROMYCIN 250 MG/1
TABLET, FILM COATED ORAL
Qty: 6 TABLET | Refills: 0 | Status: SHIPPED | OUTPATIENT
Start: 2017-01-01 | End: 2017-01-01

## 2017-01-01 RX ORDER — MORPHINE SULFATE 2 MG/ML
6 INJECTION, SOLUTION INTRAMUSCULAR; INTRAVENOUS
Status: COMPLETED | OUTPATIENT
Start: 2017-01-01 | End: 2017-01-01

## 2017-01-01 RX ORDER — LANOLIN ALCOHOL/MO/W.PET/CERES
400 CREAM (GRAM) TOPICAL 2 TIMES DAILY
Status: DISCONTINUED | OUTPATIENT
Start: 2017-01-01 | End: 2017-01-01 | Stop reason: HOSPADM

## 2017-01-01 RX ORDER — OLANZAPINE 10 MG/1
TABLET ORAL
Qty: 30 TABLET | Refills: 5 | Status: ON HOLD | OUTPATIENT
Start: 2017-01-01 | End: 2017-01-01 | Stop reason: HOSPADM

## 2017-01-01 RX ORDER — ALPRAZOLAM 0.25 MG/1
0.5 TABLET ORAL 2 TIMES DAILY PRN
Status: DISCONTINUED | OUTPATIENT
Start: 2017-01-01 | End: 2017-01-01 | Stop reason: HOSPADM

## 2017-01-01 RX ORDER — METOPROLOL TARTRATE 25 MG/1
TABLET, FILM COATED ORAL
Qty: 180 TABLET | Refills: 3 | Status: ON HOLD | OUTPATIENT
Start: 2017-01-01 | End: 2017-01-01 | Stop reason: HOSPADM

## 2017-01-01 RX ORDER — ONDANSETRON 2 MG/ML
INJECTION INTRAMUSCULAR; INTRAVENOUS
Status: DISCONTINUED | OUTPATIENT
Start: 2017-01-01 | End: 2017-01-01

## 2017-01-01 RX ORDER — FENTANYL CITRATE 50 UG/ML
25 INJECTION, SOLUTION INTRAMUSCULAR; INTRAVENOUS
Status: DISCONTINUED | OUTPATIENT
Start: 2017-01-01 | End: 2017-01-01 | Stop reason: HOSPADM

## 2017-01-01 RX ORDER — PANTOPRAZOLE SODIUM 40 MG/1
40 TABLET, DELAYED RELEASE ORAL DAILY
Qty: 14 TABLET | Refills: 0 | Status: SHIPPED | OUTPATIENT
Start: 2017-01-01 | End: 2017-01-01

## 2017-01-01 RX ORDER — LISINOPRIL 40 MG/1
TABLET ORAL
Qty: 180 TABLET | Refills: 3
Start: 2017-01-01 | End: 2017-01-01 | Stop reason: SDUPTHER

## 2017-01-01 RX ORDER — METRONIDAZOLE 500 MG/100ML
500 INJECTION, SOLUTION INTRAVENOUS ONCE
Status: COMPLETED | OUTPATIENT
Start: 2017-01-01 | End: 2017-01-01

## 2017-01-01 RX ORDER — OLANZAPINE 10 MG/1
5 TABLET ORAL DAILY
Refills: 5 | COMMUNITY
Start: 2017-01-01 | End: 2017-01-01 | Stop reason: SDUPTHER

## 2017-01-01 RX ORDER — METFORMIN HYDROCHLORIDE 500 MG/1
TABLET ORAL
Qty: 360 TABLET | Refills: 3 | Status: SHIPPED | OUTPATIENT
Start: 2017-01-01 | End: 2017-01-01

## 2017-01-01 RX ORDER — HYDROCODONE BITARTRATE AND ACETAMINOPHEN 5; 325 MG/1; MG/1
1 TABLET ORAL EVERY 6 HOURS PRN
Qty: 12 TABLET | Refills: 0 | Status: SHIPPED | OUTPATIENT
Start: 2017-01-01 | End: 2017-01-01

## 2017-01-01 RX ORDER — DEXTROSE MONOHYDRATE 100 MG/ML
1000 INJECTION, SOLUTION INTRAVENOUS
Status: DISCONTINUED | OUTPATIENT
Start: 2017-01-01 | End: 2017-01-01 | Stop reason: HOSPADM

## 2017-01-01 RX ORDER — ALPRAZOLAM 0.5 MG/1
TABLET ORAL
Qty: 60 TABLET | Refills: 0 | Status: SHIPPED | OUTPATIENT
Start: 2017-01-01 | End: 2017-01-01 | Stop reason: SDUPTHER

## 2017-01-01 RX ORDER — ACETAMINOPHEN 325 MG/1
650 TABLET ORAL EVERY 8 HOURS PRN
Status: DISCONTINUED | OUTPATIENT
Start: 2017-01-01 | End: 2017-01-01 | Stop reason: HOSPADM

## 2017-01-01 RX ORDER — CHLORHEXIDINE GLUCONATE ORAL RINSE 1.2 MG/ML
15 SOLUTION DENTAL 2 TIMES DAILY
Status: DISCONTINUED | OUTPATIENT
Start: 2017-01-01 | End: 2017-01-01 | Stop reason: HOSPADM

## 2017-01-01 RX ORDER — SODIUM CHLORIDE 0.9 % (FLUSH) 0.9 %
3 SYRINGE (ML) INJECTION EVERY 8 HOURS
Status: DISCONTINUED | OUTPATIENT
Start: 2017-01-01 | End: 2017-01-01 | Stop reason: HOSPADM

## 2017-01-01 RX ORDER — PANTOPRAZOLE SODIUM 40 MG/1
40 TABLET, DELAYED RELEASE ORAL 2 TIMES DAILY
Status: DISCONTINUED | OUTPATIENT
Start: 2017-01-01 | End: 2017-01-01

## 2017-01-01 RX ORDER — SODIUM CHLORIDE 0.9 % (FLUSH) 0.9 %
3 SYRINGE (ML) INJECTION
Status: DISCONTINUED | OUTPATIENT
Start: 2017-01-01 | End: 2017-01-01 | Stop reason: HOSPADM

## 2017-01-01 RX ORDER — BUPIVACAINE HYDROCHLORIDE 2.5 MG/ML
INJECTION, SOLUTION EPIDURAL; INFILTRATION; INTRACAUDAL
Status: DISCONTINUED | OUTPATIENT
Start: 2017-01-01 | End: 2017-01-01 | Stop reason: HOSPADM

## 2017-01-01 RX ORDER — LEVOTHYROXINE SODIUM ANHYDROUS 100 UG/5ML
44 INJECTION, POWDER, LYOPHILIZED, FOR SOLUTION INTRAVENOUS DAILY
Status: DISCONTINUED | OUTPATIENT
Start: 2017-01-01 | End: 2017-01-01

## 2017-01-01 RX ORDER — BLOOD SUGAR DIAGNOSTIC
STRIP MISCELLANEOUS
Qty: 100 STRIP | Refills: 11 | Status: SHIPPED | OUTPATIENT
Start: 2017-01-01 | End: 2017-01-01 | Stop reason: SDUPTHER

## 2017-01-01 RX ORDER — OLANZAPINE 2.5 MG/1
5 TABLET ORAL NIGHTLY
Status: DISCONTINUED | OUTPATIENT
Start: 2017-01-01 | End: 2017-01-01 | Stop reason: HOSPADM

## 2017-01-01 RX ORDER — VENLAFAXINE HYDROCHLORIDE 75 MG/1
150 CAPSULE, EXTENDED RELEASE ORAL DAILY
Status: DISCONTINUED | OUTPATIENT
Start: 2017-01-01 | End: 2017-01-01

## 2017-01-01 RX ADMIN — Medication 12.5 MG: at 11:07

## 2017-01-01 RX ADMIN — SODIUM CHLORIDE 1000 ML: 0.9 INJECTION, SOLUTION INTRAVENOUS at 01:07

## 2017-01-01 RX ADMIN — LIDOCAINE HYDROCHLORIDE 60 MG: 20 INJECTION, SOLUTION INTRAVENOUS at 04:07

## 2017-01-01 RX ADMIN — FERROUS SULFATE TAB EC 325 MG (65 MG FE EQUIVALENT) 325 MG: 325 (65 FE) TABLET DELAYED RESPONSE at 08:07

## 2017-01-01 RX ADMIN — VENLAFAXINE HYDROCHLORIDE 75 MG: 75 CAPSULE, EXTENDED RELEASE ORAL at 09:07

## 2017-01-01 RX ADMIN — SODIUM BICARBONATE 50 MEQ: 84 INJECTION, SOLUTION INTRAVENOUS at 08:10

## 2017-01-01 RX ADMIN — METRONIDAZOLE 500 MG: 500 INJECTION, SOLUTION INTRAVENOUS at 08:10

## 2017-01-01 RX ADMIN — FERROUS SULFATE TAB EC 325 MG (65 MG FE EQUIVALENT) 325 MG: 325 (65 FE) TABLET DELAYED RESPONSE at 09:07

## 2017-01-01 RX ADMIN — CHLORHEXIDINE GLUCONATE 15 ML: 1.2 RINSE ORAL at 11:10

## 2017-01-01 RX ADMIN — OLANZAPINE 5 MG: 2.5 TABLET, FILM COATED ORAL at 08:07

## 2017-01-01 RX ADMIN — SIMVASTATIN 40 MG: 40 TABLET, FILM COATED ORAL at 08:07

## 2017-01-01 RX ADMIN — DICYCLOMINE HYDROCHLORIDE 20 MG: 20 TABLET ORAL at 08:07

## 2017-01-01 RX ADMIN — CEFTRIAXONE 1 G: 1 INJECTION, SOLUTION INTRAVENOUS at 08:07

## 2017-01-01 RX ADMIN — METRONIDAZOLE 500 MG: 500 TABLET ORAL at 10:10

## 2017-01-01 RX ADMIN — PANTOPRAZOLE SODIUM 40 MG: 40 TABLET, DELAYED RELEASE ORAL at 11:07

## 2017-01-01 RX ADMIN — CEFTRIAXONE 1 G: 1 INJECTION, SOLUTION INTRAVENOUS at 12:07

## 2017-01-01 RX ADMIN — SODIUM CHLORIDE 1000 ML: 0.9 INJECTION, SOLUTION INTRAVENOUS at 10:07

## 2017-01-01 RX ADMIN — METRONIDAZOLE 500 MG: 500 TABLET ORAL at 09:07

## 2017-01-01 RX ADMIN — PANTOPRAZOLE SODIUM 40 MG: 40 TABLET, DELAYED RELEASE ORAL at 08:07

## 2017-01-01 RX ADMIN — OLANZAPINE 5 MG: 2.5 TABLET, FILM COATED ORAL at 03:07

## 2017-01-01 RX ADMIN — FAMOTIDINE 20 MG: 20 TABLET, FILM COATED ORAL at 08:07

## 2017-01-01 RX ADMIN — CIPROFLOXACIN 400 MG: 2 INJECTION, SOLUTION INTRAVENOUS at 11:10

## 2017-01-01 RX ADMIN — ONDANSETRON 4 MG: 2 INJECTION INTRAMUSCULAR; INTRAVENOUS at 02:07

## 2017-01-01 RX ADMIN — METOPROLOL TARTRATE 25 MG: 25 TABLET ORAL at 09:07

## 2017-01-01 RX ADMIN — METRONIDAZOLE 500 MG: 500 INJECTION, SOLUTION INTRAVENOUS at 04:10

## 2017-01-01 RX ADMIN — MORPHINE SULFATE 6 MG: 2 INJECTION, SOLUTION INTRAMUSCULAR; INTRAVENOUS at 12:07

## 2017-01-01 RX ADMIN — DICYCLOMINE HYDROCHLORIDE 20 MG: 20 TABLET ORAL at 09:07

## 2017-01-01 RX ADMIN — INSULIN ASPART 2 UNITS: 100 INJECTION, SOLUTION INTRAVENOUS; SUBCUTANEOUS at 08:07

## 2017-01-01 RX ADMIN — Medication 12.5 MG: at 08:07

## 2017-01-01 RX ADMIN — SODIUM CHLORIDE: 0.9 INJECTION, SOLUTION INTRAVENOUS at 04:07

## 2017-01-01 RX ADMIN — Medication 1 MCG/KG/MIN: at 09:10

## 2017-01-01 RX ADMIN — SODIUM BICARBONATE 100 MEQ: 84 INJECTION, SOLUTION INTRAVENOUS at 09:10

## 2017-01-01 RX ADMIN — PROPOFOL 50 MG: 10 INJECTION, EMULSION INTRAVENOUS at 04:07

## 2017-01-01 RX ADMIN — ALPRAZOLAM 0.5 MG: 0.25 TABLET ORAL at 10:07

## 2017-01-01 RX ADMIN — SODIUM CHLORIDE 1000 ML: 0.9 INJECTION, SOLUTION INTRAVENOUS at 05:10

## 2017-01-01 RX ADMIN — ACETAMINOPHEN 650 MG: 325 TABLET ORAL at 02:07

## 2017-01-01 RX ADMIN — MORPHINE SULFATE 4 MG: 2 INJECTION, SOLUTION INTRAMUSCULAR; INTRAVENOUS at 06:07

## 2017-01-01 RX ADMIN — LEVOTHYROXINE SODIUM 88 MCG: 0.09 TABLET ORAL at 06:07

## 2017-01-01 RX ADMIN — SODIUM CHLORIDE 125 ML/HR: 0.9 INJECTION, SOLUTION INTRAVENOUS at 08:10

## 2017-01-01 RX ADMIN — ALPRAZOLAM 0.5 MG: 0.25 TABLET ORAL at 04:07

## 2017-01-01 RX ADMIN — VASOPRESSIN 1 UNITS: 20 INJECTION, SOLUTION INTRAMUSCULAR; SUBCUTANEOUS at 09:10

## 2017-01-01 RX ADMIN — SODIUM CHLORIDE: 0.9 INJECTION, SOLUTION INTRAVENOUS at 08:10

## 2017-01-01 RX ADMIN — METRONIDAZOLE 500 MG: 500 INJECTION, SOLUTION INTRAVENOUS at 09:07

## 2017-01-01 RX ADMIN — SODIUM CHLORIDE 4 UNITS/HR: 9 INJECTION, SOLUTION INTRAVENOUS at 09:10

## 2017-01-01 RX ADMIN — METRONIDAZOLE 500 MG: 500 SOLUTION INTRAVENOUS at 05:07

## 2017-01-01 RX ADMIN — PROMETHAZINE HYDROCHLORIDE 25 MG: 25 INJECTION INTRAMUSCULAR; INTRAVENOUS at 09:07

## 2017-01-01 RX ADMIN — MAGNESIUM SULFATE IN WATER 2 G: 40 INJECTION, SOLUTION INTRAVENOUS at 02:07

## 2017-01-01 RX ADMIN — METRONIDAZOLE 500 MG: 500 INJECTION, SOLUTION INTRAVENOUS at 05:10

## 2017-01-01 RX ADMIN — FUROSEMIDE 20 MG: 20 TABLET ORAL at 08:07

## 2017-01-01 RX ADMIN — ROCURONIUM BROMIDE 50 MG: 10 INJECTION, SOLUTION INTRAVENOUS at 09:10

## 2017-01-01 RX ADMIN — METRONIDAZOLE 500 MG: 500 INJECTION, SOLUTION INTRAVENOUS at 10:07

## 2017-01-01 RX ADMIN — ALPRAZOLAM 0.5 MG: 0.25 TABLET ORAL at 10:10

## 2017-01-01 RX ADMIN — VANCOMYCIN HYDROCHLORIDE 1000 MG: 1 INJECTION, POWDER, LYOPHILIZED, FOR SOLUTION INTRAVENOUS at 09:10

## 2017-01-01 RX ADMIN — MORPHINE SULFATE 4 MG: 2 INJECTION, SOLUTION INTRAMUSCULAR; INTRAVENOUS at 09:10

## 2017-01-01 RX ADMIN — Medication 125 MG: at 06:07

## 2017-01-01 RX ADMIN — FENTANYL CITRATE 25 MCG: 50 INJECTION, SOLUTION INTRAMUSCULAR; INTRAVENOUS at 10:10

## 2017-01-01 RX ADMIN — SODIUM CHLORIDE: 0.9 INJECTION, SOLUTION INTRAVENOUS at 03:10

## 2017-01-01 RX ADMIN — MIDAZOLAM 2 MG: 1 INJECTION INTRAMUSCULAR; INTRAVENOUS at 09:10

## 2017-01-01 RX ADMIN — ASPIRIN 81 MG 81 MG: 81 TABLET ORAL at 08:07

## 2017-01-01 RX ADMIN — ONDANSETRON 4 MG: 2 INJECTION INTRAMUSCULAR; INTRAVENOUS at 06:07

## 2017-01-01 RX ADMIN — MORPHINE SULFATE 4 MG: 2 INJECTION, SOLUTION INTRAMUSCULAR; INTRAVENOUS at 06:10

## 2017-01-01 RX ADMIN — CALCIUM CHLORIDE 500 MG: 100 INJECTION, SOLUTION INTRAVENOUS at 09:10

## 2017-01-01 RX ADMIN — GABAPENTIN 300 MG: 300 CAPSULE ORAL at 08:07

## 2017-01-01 RX ADMIN — SODIUM CHLORIDE 1000 ML: 0.9 INJECTION, SOLUTION INTRAVENOUS at 06:07

## 2017-01-01 RX ADMIN — VENLAFAXINE HYDROCHLORIDE 75 MG: 75 CAPSULE, EXTENDED RELEASE ORAL at 08:07

## 2017-01-01 RX ADMIN — FENTANYL CITRATE 25 MCG: 50 INJECTION, SOLUTION INTRAMUSCULAR; INTRAVENOUS at 05:10

## 2017-01-01 RX ADMIN — LISINOPRIL 10 MG: 10 TABLET ORAL at 12:07

## 2017-01-01 RX ADMIN — CHLORHEXIDINE GLUCONATE 15 ML: 1.2 RINSE ORAL at 08:10

## 2017-01-01 RX ADMIN — SODIUM CHLORIDE 1000 ML: 0.9 INJECTION, SOLUTION INTRAVENOUS at 12:10

## 2017-01-01 RX ADMIN — METRONIDAZOLE 500 MG: 500 TABLET ORAL at 02:07

## 2017-01-01 RX ADMIN — METOPROLOL TARTRATE 25 MG: 25 TABLET ORAL at 08:07

## 2017-01-01 RX ADMIN — VASOPRESSIN 1 UNITS: 20 INJECTION, SOLUTION INTRAMUSCULAR; SUBCUTANEOUS at 10:10

## 2017-01-01 RX ADMIN — HYDROMORPHONE HYDROCHLORIDE 1 MG: 1 INJECTION, SOLUTION INTRAMUSCULAR; INTRAVENOUS; SUBCUTANEOUS at 03:10

## 2017-01-01 RX ADMIN — METOCLOPRAMIDE 10 MG: 5 INJECTION, SOLUTION INTRAMUSCULAR; INTRAVENOUS at 06:07

## 2017-01-01 RX ADMIN — Medication 2 MCG/KG/MIN: at 12:10

## 2017-01-01 RX ADMIN — DEXMEDETOMIDINE HYDROCHLORIDE 0.3 MCG/KG/HR: 4 INJECTION, SOLUTION INTRAVENOUS at 07:10

## 2017-01-01 RX ADMIN — HYDROCORTISONE SODIUM SUCCINATE 100 MG: 100 INJECTION, POWDER, FOR SOLUTION INTRAMUSCULAR; INTRAVENOUS at 10:10

## 2017-01-01 RX ADMIN — MORPHINE SULFATE 2 MG: 2 INJECTION, SOLUTION INTRAMUSCULAR; INTRAVENOUS at 09:07

## 2017-01-01 RX ADMIN — METRONIDAZOLE 500 MG: 500 TABLET ORAL at 08:07

## 2017-01-01 RX ADMIN — OLANZAPINE 5 MG: 2.5 TABLET, FILM COATED ORAL at 09:07

## 2017-01-01 RX ADMIN — OLANZAPINE 5 MG: 2.5 TABLET, FILM COATED ORAL at 11:07

## 2017-01-01 RX ADMIN — MIDAZOLAM 2 MG: 1 INJECTION INTRAMUSCULAR; INTRAVENOUS at 10:10

## 2017-01-01 RX ADMIN — PANTOPRAZOLE SODIUM 80 MG: 40 INJECTION, POWDER, FOR SOLUTION INTRAVENOUS at 05:10

## 2017-01-01 RX ADMIN — MAGNESIUM OXIDE TAB 400 MG (241.3 MG ELEMENTAL MG) 400 MG: 400 (241.3 MG) TAB at 08:07

## 2017-01-01 RX ADMIN — FENTANYL CITRATE 25 MCG: 50 INJECTION, SOLUTION INTRAMUSCULAR; INTRAVENOUS at 03:10

## 2017-01-01 RX ADMIN — SODIUM CHLORIDE 500 ML: 0.9 INJECTION, SOLUTION INTRAVENOUS at 06:07

## 2017-01-01 RX ADMIN — ALPRAZOLAM 0.5 MG: 0.25 TABLET ORAL at 09:07

## 2017-01-01 RX ADMIN — FERROUS SULFATE TAB EC 325 MG (65 MG FE EQUIVALENT) 325 MG: 325 (65 FE) TABLET DELAYED RESPONSE at 11:07

## 2017-01-01 RX ADMIN — LEVOTHYROXINE SODIUM 88 MCG: 0.09 TABLET ORAL at 09:07

## 2017-01-01 RX ADMIN — PANTOPRAZOLE SODIUM 40 MG: 40 INJECTION, POWDER, FOR SOLUTION INTRAVENOUS at 01:07

## 2017-01-01 RX ADMIN — SODIUM CHLORIDE: 0.9 INJECTION, SOLUTION INTRAVENOUS at 05:07

## 2017-01-01 RX ADMIN — ALBUMIN HUMAN: 0.05 INJECTION, SOLUTION INTRAVENOUS at 09:10

## 2017-01-01 RX ADMIN — FENTANYL CITRATE 25 MCG: 50 INJECTION, SOLUTION INTRAMUSCULAR; INTRAVENOUS at 04:10

## 2017-01-01 RX ADMIN — SODIUM CHLORIDE 6 UNITS/HR: 9 INJECTION, SOLUTION INTRAVENOUS at 07:10

## 2017-01-01 RX ADMIN — ALPRAZOLAM 0.5 MG: 0.25 TABLET ORAL at 08:07

## 2017-01-01 RX ADMIN — Medication 125 MG: at 11:07

## 2017-01-01 RX ADMIN — METRONIDAZOLE 500 MG: 500 INJECTION, SOLUTION INTRAVENOUS at 12:10

## 2017-01-01 RX ADMIN — FAMOTIDINE 20 MG: 10 INJECTION, SOLUTION INTRAVENOUS at 08:07

## 2017-01-01 RX ADMIN — METRONIDAZOLE 500 MG: 500 INJECTION, SOLUTION INTRAVENOUS at 12:07

## 2017-01-01 RX ADMIN — CEFEPIME HYDROCHLORIDE 1 G: 1 INJECTION, SOLUTION INTRAVENOUS at 08:07

## 2017-01-01 RX ADMIN — GABAPENTIN 300 MG: 300 CAPSULE ORAL at 03:07

## 2017-01-01 RX ADMIN — SODIUM CHLORIDE: 0.9 INJECTION, SOLUTION INTRAVENOUS at 03:07

## 2017-01-01 RX ADMIN — SODIUM BICARBONATE 50 MEQ: 84 INJECTION, SOLUTION INTRAVENOUS at 09:10

## 2017-01-01 RX ADMIN — OLANZAPINE 5 MG: 2.5 TABLET, FILM COATED ORAL at 10:10

## 2017-01-01 RX ADMIN — SODIUM CHLORIDE: 0.9 INJECTION, SOLUTION INTRAVENOUS at 09:10

## 2017-01-01 RX ADMIN — MORPHINE SULFATE 2 MG: 2 INJECTION, SOLUTION INTRAMUSCULAR; INTRAVENOUS at 08:07

## 2017-01-01 RX ADMIN — GABAPENTIN 300 MG: 300 CAPSULE ORAL at 11:07

## 2017-01-01 RX ADMIN — Medication 250 MG: at 02:07

## 2017-01-01 RX ADMIN — NOREPINEPHRINE BITARTRATE 3 MCG/KG/MIN: 1 INJECTION, SOLUTION, CONCENTRATE INTRAVENOUS at 06:10

## 2017-01-01 RX ADMIN — SODIUM CHLORIDE 7 UNITS/HR: 9 INJECTION, SOLUTION INTRAVENOUS at 06:10

## 2017-01-01 RX ADMIN — SODIUM CHLORIDE 1000 ML: 0.9 INJECTION, SOLUTION INTRAVENOUS at 06:10

## 2017-01-01 RX ADMIN — Medication 125 MG: at 01:07

## 2017-01-01 RX ADMIN — Medication 3 MCG/KG/MIN: at 04:10

## 2017-01-01 RX ADMIN — FENTANYL CITRATE 25 MCG: 50 INJECTION INTRAMUSCULAR; INTRAVENOUS at 05:10

## 2017-01-01 RX ADMIN — LEVOTHYROXINE SODIUM 88 MCG: 0.09 TABLET ORAL at 05:10

## 2017-01-01 RX ADMIN — MAGNESIUM SULFATE IN WATER 2 G: 40 INJECTION, SOLUTION INTRAVENOUS at 12:07

## 2017-01-01 RX ADMIN — SODIUM CHLORIDE, SODIUM LACTATE, POTASSIUM CHLORIDE, AND CALCIUM CHLORIDE 1000 ML: .6; .31; .03; .02 INJECTION, SOLUTION INTRAVENOUS at 07:10

## 2017-01-01 RX ADMIN — PANTOPRAZOLE SODIUM 40 MG: 40 TABLET, DELAYED RELEASE ORAL at 09:07

## 2017-01-01 RX ADMIN — MORPHINE SULFATE 4 MG: 2 INJECTION, SOLUTION INTRAMUSCULAR; INTRAVENOUS at 01:07

## 2017-01-01 RX ADMIN — ONDANSETRON 4 MG: 2 INJECTION INTRAMUSCULAR; INTRAVENOUS at 08:07

## 2017-01-01 RX ADMIN — METRONIDAZOLE 500 MG: 500 TABLET ORAL at 05:07

## 2017-01-01 RX ADMIN — Medication 0.5 MCG/KG/MIN: at 03:10

## 2017-01-01 RX ADMIN — MORPHINE SULFATE 2 MG: 2 INJECTION, SOLUTION INTRAMUSCULAR; INTRAVENOUS at 11:10

## 2017-01-01 RX ADMIN — IOHEXOL 30 ML: 300 INJECTION, SOLUTION INTRAVENOUS at 09:07

## 2017-01-01 RX ADMIN — FENTANYL CITRATE 50 MCG: 50 INJECTION, SOLUTION INTRAMUSCULAR; INTRAVENOUS at 10:10

## 2017-01-01 RX ADMIN — Medication 2.5 MCG/KG/MIN: at 02:10

## 2017-01-01 RX ADMIN — MAGNESIUM OXIDE TAB 400 MG (241.3 MG ELEMENTAL MG) 400 MG: 400 (241.3 MG) TAB at 11:07

## 2017-01-01 RX ADMIN — LEVOTHYROXINE SODIUM ANHYDROUS 44 MCG: 100 INJECTION, POWDER, LYOPHILIZED, FOR SOLUTION INTRAVENOUS at 11:10

## 2017-01-01 RX ADMIN — CEFEPIME HYDROCHLORIDE 1 G: 1 INJECTION, SOLUTION INTRAVENOUS at 08:10

## 2017-01-01 RX ADMIN — LEVOTHYROXINE SODIUM 88 MCG: 0.09 TABLET ORAL at 08:07

## 2017-01-01 RX ADMIN — GABAPENTIN 300 MG: 300 CAPSULE ORAL at 09:07

## 2017-01-01 RX ADMIN — Medication 0.1 MCG/KG/MIN: at 06:10

## 2017-01-01 RX ADMIN — FENTANYL CITRATE 25 MCG: 50 INJECTION, SOLUTION INTRAMUSCULAR; INTRAVENOUS at 08:10

## 2017-01-01 RX ADMIN — PANTOPRAZOLE SODIUM 40 MG: 40 INJECTION, POWDER, FOR SOLUTION INTRAVENOUS at 11:10

## 2017-01-01 RX ADMIN — SODIUM CHLORIDE 1000 ML: 0.9 INJECTION, SOLUTION INTRAVENOUS at 11:07

## 2017-01-03 NOTE — TELEPHONE ENCOUNTER
KHADIJAHI -  Pt here today for BP check  Pt home BP log is scanned into media for dr jama to review  We advised the pt to purchase a new machine due to her machine reading in office of 162/66  We checked the pt BP several times and locations - different people checked the BP    142/80 left arm  120/80 left wrist  100/80 right arm   143/72 left arm  BP faint

## 2017-01-04 NOTE — TELEPHONE ENCOUNTER
Spoke with pt about lab results and medication  She verbalized that she understood and knows to go to the hosp for one month a1c    Please put in order for a1c

## 2017-01-04 NOTE — TELEPHONE ENCOUNTER
----- Message from Saeed Armando MD sent at 1/2/2017 10:48 AM CST -----  Diabetes not controlled; increasing dose of amaryl, glimiperide, to 4 mg every AM instead of 2 mg; repeat A1C 1 month

## 2017-01-10 NOTE — TELEPHONE ENCOUNTER
----- Message from Saeed Armando MD sent at 1/8/2017  8:42 PM CST -----  Several old strokes; no recent strokes

## 2017-02-08 NOTE — TELEPHONE ENCOUNTER
----- Message from Venessa Nelson sent at 2/8/2017  8:21 AM CST -----  Contact: Jing / [phone]  Patient's daughter, Jing , johanna. Patient was suppose to have a standing A1C in Epic to get done at the hospital. Stated spoke with Dr. Armando last month and was assured it would be in. Please advise.

## 2017-02-20 NOTE — PROGRESS NOTES
Subjective:       Patient ID: Neha Conrad is a 66 y.o. female.    Chief Complaint: URI    URI    This is a new problem. The current episode started in the past 7 days. The problem has been unchanged. The fever has been present for less than 1 day. Associated symptoms include congestion, coughing, a sore throat and wheezing. Pertinent negatives include no abdominal pain, chest pain, diarrhea, dysuria, ear pain, headaches, joint pain, joint swelling, nausea, neck pain, plugged ear sensation, rash, rhinorrhea, sinus pain, sneezing, swollen glands or vomiting. Treatments tried: mucinx, nyquill, benadyl, albuterol. The treatment provided no relief.     Review of Systems   Constitutional: Negative for chills, diaphoresis, fatigue and fever.   HENT: Positive for congestion and sore throat. Negative for ear pain, rhinorrhea, sinus pressure, sneezing and voice change.    Eyes: Negative for photophobia and visual disturbance.   Respiratory: Positive for cough, shortness of breath and wheezing. Negative for chest tightness.    Cardiovascular: Negative for chest pain, palpitations and leg swelling.   Gastrointestinal: Negative for abdominal pain, diarrhea, nausea and vomiting.   Genitourinary: Negative for dysuria.   Musculoskeletal: Negative for joint pain and neck pain.   Skin: Negative for color change, pallor, rash and wound.   Neurological: Negative for dizziness, weakness, light-headedness and headaches.       Objective:      Physical Exam   Constitutional: She is oriented to person, place, and time. She appears well-developed and well-nourished. No distress.   HENT:   Right Ear: Tympanic membrane and external ear normal.   Left Ear: Tympanic membrane and external ear normal.   Nose: No mucosal edema or rhinorrhea. Right sinus exhibits no maxillary sinus tenderness and no frontal sinus tenderness. Left sinus exhibits no maxillary sinus tenderness and no frontal sinus tenderness.   Mouth/Throat: No oropharyngeal exudate,  posterior oropharyngeal edema or posterior oropharyngeal erythema.   Cardiovascular: Normal rate, regular rhythm and normal heart sounds.    Pulmonary/Chest: Effort normal. She has wheezes.   Neurological: She is alert and oriented to person, place, and time.   Skin: Skin is warm and dry. She is not diaphoretic.   Psychiatric: She has a normal mood and affect. Her behavior is normal. Judgment and thought content normal.   Vitals reviewed.      Assessment:       1. Bronchitis        Plan:       Bronchitis    Other orders  -     azithromycin (Z-GEE) 250 MG tablet; Take 2 tablets by mouth on day 1; Take 1 tablet by mouth on days 2-5  Dispense: 6 tablet; Refill: 0  -     benzonatate (TESSALON) 200 MG capsule; Take 1 capsule (200 mg total) by mouth 3 (three) times daily as needed for Cough.  Dispense: 30 capsule; Refill: 0

## 2017-03-14 NOTE — TELEPHONE ENCOUNTER
----- Message from Saeed Armando MD sent at 3/13/2017  8:16 PM CDT -----  Diabetes not controlled; need to add a third diabetes medicine; add farxiga 5 mg one a day; don't stop any medicines; repeat A1C 2 months

## 2017-03-14 NOTE — TELEPHONE ENCOUNTER
Patient notified of blood work results and to start taking Farxiga 5MG with her other diabetes medication. Patient will recheck A1C in 2 months.

## 2017-04-27 NOTE — MR AVS SNAPSHOT
[facility]  [address] [place] [id number]  Phone: [phone]  Fax: [phone]                  Neha Conrad   2017 8:40 AM   Office Visit    Description:  Female : 1950   Provider:  Saeed Armando MD   Department:  [facility]           Reason for Visit     Follow-up           Diagnoses this Visit        Comments    Anxiety    -  Primary     Cerebrovascular accident (CVA), unspecified mechanism         Coronary artery disease involving nonautologous biological coronary bypass graft without angina pectoris         Essential hypertension         Coronary artery disease involving native coronary artery of native heart without angina pectoris         CVD (cardiovascular disease)         Old MI (myocardial infarction)         Type 2 diabetes mellitus without complication, without long-term current use of insulin         Type 2 diabetes mellitus with other circulatory complication         Hyperlipidemia, unspecified hyperlipidemia type         Gastroesophageal reflux disease without esophagitis         S/P PTCA (percutaneous transluminal coronary angioplasty)         S/P CABG (coronary artery bypass graft)         Small bowel obstruction, partial     intermitent           To Do List           Goals (5 Years of Data)     None      Follow-Up and Disposition     Return in about 3 months (around 2017).      [facility]     [facility] On Call Nurse Care Line -  Assistance  Unless otherwise directed by your provider, please contact [facility] On-[facility], our nurse care line that is available for  assistance.     Registered nurses in the [facility] provide: appointment scheduling, clinical advisement, health education, and other advisory services.  Call: [phone] (toll free)               Medications           Message regarding Medications     Verify the changes and/or additions to your medication regime listed below are the same as  discussed with your clinician today.  If any of these changes or additions are incorrect, please notify your healthcare provider.        STOP taking these medications     hydrocodone-acetaminophen 5-325mg (NORCO) 5-325 mg per tablet Take 1 tablet by mouth 2 (two) times daily.           Verify that the below list of medications is an accurate representation of the medications you are currently taking.  If none reported, the list may be blank. If incorrect, please contact your healthcare provider. Carry this list with you in case of emergency.           Current Medications     alendronate (FOSAMAX) 70 MG tablet TAKE 1 TABLET BY MOUTH EVERY 7 DAYS    alprazolam (XANAX) 0.5 MG tablet TAKE 1 TABLET BY MOUTH TWICE DAILY AS NEEDED FOR ANXIETY    aspirin 81 MG Chew Take 81 mg by mouth once daily.    calcium-vitamin D3 500 mg(1,250mg) -200 unit per tablet Take 1 tablet by mouth Daily.    clopidogrel (PLAVIX) 75 mg tablet Take 1 tablet (75 mg total) by mouth once daily.    CONTOUR NEXT STRIPS Strp 1 strip 3 (three) times daily.     cyanocobalamin, vitamin B-12, (VITAMIN B-12) 1,000 mcg Subl Place 1,000 mcg under the tongue once daily.    ferrous sulfate 325 mg (65 mg iron) Tab tablet Take 1 tablet (325 mg total) by mouth 2 (two) times daily.    fish oil-omega-3 fatty acids 300-1,000 mg capsule Take 2 g by mouth 2 (two) times daily.    furosemide (LASIX) 40 MG tablet Take 1 tablet (40 mg total) by mouth once daily.    gabapentin (NEURONTIN) 300 MG capsule Take 1 capsule (300 mg total) by mouth every evening. For nerve pain in abdomen    glimepiride (AMARYL) 4 MG tablet Take 1 tablet (4 mg total) by mouth before breakfast. For diabetes    levothyroxine (SYNTHROID) 88 MCG tablet Take 1 tablet (88 mcg total) by mouth once daily.    lisinopril (PRINIVIL,ZESTRIL) 40 MG tablet TAKE 1 TABLET(40MG) BY MOUTH TWICE DAILY    magnesium oxide (MAG-OX) 400 mg tablet Take 1 tablet (400 mg total) by mouth 2 (two) times daily.    metformin  (GLUCOPHAGE) 500 MG tablet TAKE 2 TABLETS BY MOUTH TWICE DAILY    metoprolol tartrate (LOPRESSOR) 25 MG tablet TAKE 1 TABLET BY MOUTH TWICE DAILY    MICROLET LANCET Misc 3 (three) times daily.     nitroGLYCERIN (NITROSTAT) 0.4 MG SL tablet Place 0.4 mg under the tongue every 5 (five) minutes as needed for Chest pain.    olanzapine (ZYPREXA) 10 MG tablet Take 5 mg by mouth once daily.    oxycodone-acetaminophen (PERCOCET) 7.5-325 mg per tablet TK ONE T PO Q 6 TO 8 H PRF PAIN    simvastatin (ZOCOR) 40 MG tablet TAKE 1 TABLET BY MOUTH EVERY DAY    SITagliptan (JANUVIA) 100 MG Tab Take 1 tablet (100 mg total) by mouth once daily.    venlafaxine (EFFEXOR-XR) 75 MG 24 hr capsule Take 1 capsule (75 mg total) by mouth once daily.           Clinical Reference Information           Your Vitals Were     BP Pulse Temp Resp Height Weight    124/76 (BP Location: Left arm, Patient Position: Sitting, BP Method: Manual) 72 97.3 °F (36.3 °C) (Oral) 18 5' (1.524 m) 62.7 kg (138 lb 3.7 oz)    SpO2 BMI             98% 27 kg/m2         Blood Pressure          Most Recent Value    BP  124/76      Allergies as of 4/27/2017     Farxiga [Dapagliflozin]    Chantix [Varenicline]    Clindamycin    Itraconazole      Immunizations Administered on Date of Encounter - 4/27/2017     None      Orders Placed During Today's Visit     Future Labs/Procedures Expected by Expires    Amylase  4/27/2017 6/26/2018    CBC auto differential  4/27/2017 6/26/2018    Comprehensive metabolic panel  4/27/2017 6/26/2018    CT Abdomen Pelvis  Without Contrast  4/27/2017 4/28/2018    Lipase  4/27/2017 6/26/2018    Hemoglobin A1c  As directed 4/27/2018    Lipid panel  As directed 4/27/2018    Sedimentation rate, manual  As directed 4/27/2018    Urinalysis  As directed 4/27/2018      Smoking Cessation     If you would like to quit smoking:   You may be eligible for free services if you are a [place] resident and started smoking cigarettes before September 1, 1988.   Call the [employer]oking [employer]essation [employer] ([employer]) toll free at ([phone] 262-7086 or [phone] [phone].   Call 1-800-QUIT-NOW if you do not meet the above criteria.   Contact us via email: [email or web address]   View our website for more information: [email or web address]        Language Assistance Services     ATTENTION: Language assistance services are available, free of charge. Please call [phone].      ATENCIÓN: Si chiquis marla, tiene a dotson disposición servicios gratuitos de asistencia lingüística. Llame al [phone].     CHÚ Ý: N?u b?n nói Ti?ng Vi?t, có các d?ch v? h? tr? ngôn ng? mi?n phí dành cho b?n. G?i s? [phone].         [facility] complies with applicable Federal civil rights laws and does not discriminate on the basis of race, color, national origin, age, disability, or sex.

## 2017-04-27 NOTE — PROGRESS NOTES
Subjective:      Patient ID: Neha Conrad is a 66 y.o. female.    Chief Complaint: Follow-up    HPI Comments: Check up; few episodes of severe abd pain since last visit; entire abdomen; 10 severe 2 hours relieved by pain; pill; stomach feels hard, if roll over side, pain goes to left; pants feel tight; same as before had gb out; voids ok; bm normal except occ black; 2 irons a day; no alcohol; asa; no nsaids; nausea with one episode; has had several surgeries SUGARS better; concerned about pancreas with side effects of januvia      Review of Systems   Gastrointestinal: Positive for abdominal distention.   Psychiatric/Behavioral: Positive for dysphoric mood.   All other systems reviewed and are negative.    Objective:     Physical Exam   Constitutional: She is oriented to person, place, and time. She appears well-developed and well-nourished.   HENT:   Head: Normocephalic.   Eyes: Conjunctivae and EOM are normal. Pupils are equal, round, and reactive to light.   Neck: Normal range of motion. Neck supple.   Cardiovascular: Normal rate, regular rhythm and normal heart sounds.    Pulses:       Dorsalis pedis pulses are 3+ on the right side, and 3+ on the left side.        Posterior tibial pulses are 3+ on the right side, and 3+ on the left side.   Pulmonary/Chest: Effort normal and breath sounds normal.   Abdominal: Soft. Bowel sounds are normal. She exhibits no distension and no mass. There is no tenderness. There is no rebound and no guarding. No hernia.   Musculoskeletal:        Right foot: There is normal range of motion and no deformity.        Left foot: There is normal range of motion and no deformity.   Feet:   Right Foot:   Protective Sensation: 4 sites tested. 4 sites sensed.   Skin Integrity: Positive for dry skin. Negative for ulcer, blister, skin breakdown, erythema, warmth or callus.   Left Foot:   Protective Sensation: 4 sites tested. 4 sites sensed.   Skin Integrity: Positive for dry skin. Negative for  ulcer, blister, skin breakdown, erythema, warmth or callus.   Neurological: She is alert and oriented to person, place, and time. She has normal reflexes.   Skin: Skin is warm and dry.   Psychiatric: She has a normal mood and affect. Her behavior is normal. Judgment and thought content normal.   Nursing note and vitals reviewed.    Assessment:     1. Anxiety    2. Cerebrovascular accident (CVA), unspecified mechanism    3. Coronary artery disease involving nonautologous biological coronary bypass graft without angina pectoris    4. Essential hypertension    5. Coronary artery disease involving native coronary artery of native heart without angina pectoris    6. CVD (cardiovascular disease)    7. Old MI (myocardial infarction)    8. Type 2 diabetes mellitus without complication, without long-term current use of insulin    9. Type 2 diabetes mellitus with other circulatory complication    10. Hyperlipidemia, unspecified hyperlipidemia type    11. Gastroesophageal reflux disease without esophagitis    12. S/P PTCA (percutaneous transluminal coronary angioplasty)    13. S/P CABG (coronary artery bypass graft)    14. Small bowel obstruction, partial      Plan:     New Prescriptions    No medications on file     Discontinued Medications    HYDROCODONE-ACETAMINOPHEN 5-325MG (NORCO) 5-325 MG PER TABLET    Take 1 tablet by mouth 2 (two) times daily.    LEVOTHYROXINE (SYNTHROID) 88 MCG TABLET    TAKE 1 TABLET BY MOUTH EVERY DAY    OLANZAPINE (ZYPREXA) 2.5 MG TABLET    Take 2.5 mg by mouth every evening.     Modified Medications    No medications on file       Anxiety  -     CBC auto differential; Future; Expected date: 4/27/17  -     Comprehensive metabolic panel; Future; Expected date: 4/27/17  -     Hemoglobin A1c; Future  -     Lipid panel; Future  -     Sedimentation rate, manual; Future  -     Urinalysis; Future  -     Amylase; Future; Expected date: 4/27/17  -     Lipase; Future; Expected date: 4/27/17  -     CT Abdomen  Pelvis  Without Contrast; Future; Expected date: 4/27/17    Cerebrovascular accident (CVA), unspecified mechanism  -     CBC auto differential; Future; Expected date: 4/27/17  -     Comprehensive metabolic panel; Future; Expected date: 4/27/17  -     Hemoglobin A1c; Future  -     Lipid panel; Future  -     Sedimentation rate, manual; Future  -     Urinalysis; Future  -     Amylase; Future; Expected date: 4/27/17  -     Lipase; Future; Expected date: 4/27/17  -     CT Abdomen Pelvis  Without Contrast; Future; Expected date: 4/27/17    Coronary artery disease involving nonautologous biological coronary bypass graft without angina pectoris  -     CBC auto differential; Future; Expected date: 4/27/17  -     Comprehensive metabolic panel; Future; Expected date: 4/27/17  -     Hemoglobin A1c; Future  -     Lipid panel; Future  -     Sedimentation rate, manual; Future  -     Urinalysis; Future  -     Amylase; Future; Expected date: 4/27/17  -     Lipase; Future; Expected date: 4/27/17  -     CT Abdomen Pelvis  Without Contrast; Future; Expected date: 4/27/17    Essential hypertension  -     CBC auto differential; Future; Expected date: 4/27/17  -     Comprehensive metabolic panel; Future; Expected date: 4/27/17  -     Hemoglobin A1c; Future  -     Lipid panel; Future  -     Sedimentation rate, manual; Future  -     Urinalysis; Future  -     Amylase; Future; Expected date: 4/27/17  -     Lipase; Future; Expected date: 4/27/17  -     CT Abdomen Pelvis  Without Contrast; Future; Expected date: 4/27/17    Coronary artery disease involving native coronary artery of native heart without angina pectoris  -     CBC auto differential; Future; Expected date: 4/27/17  -     Comprehensive metabolic panel; Future; Expected date: 4/27/17  -     Hemoglobin A1c; Future  -     Lipid panel; Future  -     Sedimentation rate, manual; Future  -     Urinalysis; Future  -     Amylase; Future; Expected date: 4/27/17  -     Lipase; Future;  Expected date: 4/27/17  -     CT Abdomen Pelvis  Without Contrast; Future; Expected date: 4/27/17    CVD (cardiovascular disease)  -     CBC auto differential; Future; Expected date: 4/27/17  -     Comprehensive metabolic panel; Future; Expected date: 4/27/17  -     Hemoglobin A1c; Future  -     Lipid panel; Future  -     Sedimentation rate, manual; Future  -     Urinalysis; Future  -     Amylase; Future; Expected date: 4/27/17  -     Lipase; Future; Expected date: 4/27/17  -     CT Abdomen Pelvis  Without Contrast; Future; Expected date: 4/27/17    Old MI (myocardial infarction)  -     CBC auto differential; Future; Expected date: 4/27/17  -     Comprehensive metabolic panel; Future; Expected date: 4/27/17  -     Hemoglobin A1c; Future  -     Lipid panel; Future  -     Sedimentation rate, manual; Future  -     Urinalysis; Future  -     Amylase; Future; Expected date: 4/27/17  -     Lipase; Future; Expected date: 4/27/17  -     CT Abdomen Pelvis  Without Contrast; Future; Expected date: 4/27/17    Type 2 diabetes mellitus without complication, without long-term current use of insulin  -     CBC auto differential; Future; Expected date: 4/27/17  -     Comprehensive metabolic panel; Future; Expected date: 4/27/17  -     Hemoglobin A1c; Future  -     Lipid panel; Future  -     Sedimentation rate, manual; Future  -     Urinalysis; Future  -     Amylase; Future; Expected date: 4/27/17  -     Lipase; Future; Expected date: 4/27/17  -     CT Abdomen Pelvis  Without Contrast; Future; Expected date: 4/27/17    Type 2 diabetes mellitus with other circulatory complication  -     CBC auto differential; Future; Expected date: 4/27/17  -     Comprehensive metabolic panel; Future; Expected date: 4/27/17  -     Hemoglobin A1c; Future  -     Lipid panel; Future  -     Sedimentation rate, manual; Future  -     Urinalysis; Future  -     Amylase; Future; Expected date: 4/27/17  -     Lipase; Future; Expected date: 4/27/17  -     CT  Abdomen Pelvis  Without Contrast; Future; Expected date: 4/27/17    Hyperlipidemia, unspecified hyperlipidemia type  -     CBC auto differential; Future; Expected date: 4/27/17  -     Comprehensive metabolic panel; Future; Expected date: 4/27/17  -     Hemoglobin A1c; Future  -     Lipid panel; Future  -     Sedimentation rate, manual; Future  -     Urinalysis; Future  -     Amylase; Future; Expected date: 4/27/17  -     Lipase; Future; Expected date: 4/27/17  -     CT Abdomen Pelvis  Without Contrast; Future; Expected date: 4/27/17    Gastroesophageal reflux disease without esophagitis  -     CBC auto differential; Future; Expected date: 4/27/17  -     Comprehensive metabolic panel; Future; Expected date: 4/27/17  -     Hemoglobin A1c; Future  -     Lipid panel; Future  -     Sedimentation rate, manual; Future  -     Urinalysis; Future  -     Amylase; Future; Expected date: 4/27/17  -     Lipase; Future; Expected date: 4/27/17  -     CT Abdomen Pelvis  Without Contrast; Future; Expected date: 4/27/17    S/P PTCA (percutaneous transluminal coronary angioplasty)  -     CBC auto differential; Future; Expected date: 4/27/17  -     Comprehensive metabolic panel; Future; Expected date: 4/27/17  -     Hemoglobin A1c; Future  -     Lipid panel; Future  -     Sedimentation rate, manual; Future  -     Urinalysis; Future  -     Amylase; Future; Expected date: 4/27/17  -     Lipase; Future; Expected date: 4/27/17  -     CT Abdomen Pelvis  Without Contrast; Future; Expected date: 4/27/17    S/P CABG (coronary artery bypass graft)  -     CBC auto differential; Future; Expected date: 4/27/17  -     Comprehensive metabolic panel; Future; Expected date: 4/27/17  -     Hemoglobin A1c; Future  -     Lipid panel; Future  -     Sedimentation rate, manual; Future  -     Urinalysis; Future  -     Amylase; Future; Expected date: 4/27/17  -     Lipase; Future; Expected date: 4/27/17  -     CT Abdomen Pelvis  Without Contrast; Future;  Expected date: 4/27/17    Small bowel obstruction, partial  Comments:  intermitent  Orders:  -     CBC auto differential; Future; Expected date: 4/27/17  -     Comprehensive metabolic panel; Future; Expected date: 4/27/17  -     Hemoglobin A1c; Future  -     Lipid panel; Future  -     Sedimentation rate, manual; Future  -     Urinalysis; Future  -     Amylase; Future; Expected date: 4/27/17  -     Lipase; Future; Expected date: 4/27/17  -     CT Abdomen Pelvis  Without Contrast; Future; Expected date: 4/27/17      If pain reoccurs, call immediately to go get a sbft

## 2017-06-20 PROBLEM — Z86.010 H/O ADENOMATOUS POLYP OF COLON: Status: ACTIVE | Noted: 2017-01-01

## 2017-06-20 PROBLEM — K74.60 CIRRHOSIS OF LIVER WITHOUT ASCITES: Status: ACTIVE | Noted: 2017-01-01

## 2017-06-20 NOTE — PROGRESS NOTES
"Subjective:      Patient ID: Neha Conrad is a 66 y.o. female.    Chief Complaint: Abdominal Pain; Diarrhea; and Nausea    HPI:   Patient presents for GI follow-up.    She is accompanied by two of her family members.    Over the past couple of months she's had episodes of severe generalized abdominal pain associated with "projectile vomiting".  She's probably had for these episodes in the last 2 months.  Sometimes it lasted several hours.  Typically followed by diarrhea.  The pattern might be consistent with transient bowel obstruction but on follow-up CT scan May H 2017, in the absence of symptoms, there were no acute changes  2 weeks ago patient underwent ventral hernia repair.  Hernia was in the epigastric area.  No severe abdominal pain episodes in the last 3 weeks.  No longer taking narcotic analgesics.  Is maintained on Plavix, Fosamax, Zyprexa.  Past medical history includes prior appendectomy, cholecystectomy, hysterectomy, coronary artery bypass, coronary artery stent placement.  Patient had a previous [facility] admission in mid 2015 for abdominal pain.  Was noticed to have cirrhosis on imaging studies.  Workup was negative for viral hepatitis or autoimmune hepatitis.  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  She had a brother who underwent liver transplant due to Cooper induced cirrhosis      Prior EGD/colonoscopy evaluation of May 2015 demonstrated sparse sigmoid diverticula and a 2 mm polyp.  Gastroscopy relatively unremarkable.  Duodenal biopsies normal.  Gastric biopsy: Gastritis.  Patient is maintained on PPI.  Had a trial of Reglan for nausea in the past with questionable improvement  Patient continues to smoke cigarettes.  Family history positive for colon polyps.  .     Review of patient's allergies indicates:   Allergen Reactions    Farxiga [dapagliflozin] Anaphylaxis     Couldn't void    Chantix [varenicline]     Clindamycin     Itraconazole      Past Medical History: "   Diagnosis Date    Calcification of aorta     Coronary artery disease     Diabetes mellitus     GERD (gastroesophageal reflux disease)     Hyperlipidemia     Hypertension     Renal disorder     Thyroid disease     Ventral hernia 05/08/2017    on CT, referred to Dr Gaytan     Past Surgical History:   Procedure Laterality Date    APPENDECTOMY      CARDIAC SURGERY      CAROTID ENDARTERECTOMY      Wolf    CHOLECYSTECTOMY      CORONARY ARTERY BYPASS GRAFT      2005    HYSTERECTOMY      SHOULDER SURGERY Right 2017    TONSILLECTOMY      ULNAR NERVE TRANSPOSITION Right 03/2016     Family History   Problem Relation Age of Onset    Pulmonary embolism Mother     Diabetes Father      Social History     Social History    Marital status:      Spouse name: N/A    Number of children: N/A    Years of education: N/A     Occupational History    Not on file.     Social History Main Topics    Smoking status: Current Every Day Smoker     Years: 0.50    Smokeless tobacco: Not on file    Alcohol use No    Drug use: Unknown    Sexual activity: Not on file     Other Topics Concern    Not on file     Social History Narrative    No narrative on file       Review of Systems:  Constitutional: Negative for appetite change.   HENT: Negative for trouble swallowing.  Hoarseness and sinus congestion  Eyes: Negative for photophobia.   Respiratory: Positive for cough and shortness of breath.   Cardiovascular: Negative for palpitations.   Gastrointestinal: See HPI for details.  Genitourinary: Negative for frequency and hematuria.   Skin: Negative for rash.    Musculoskeletal: Joint pains  Neurological: Negative for weakness and headaches.   Hematological: Negative.   Psychiatric/Behavioral: Negative for suicidal ideas and behavioral problems.  Anxiety, depression, memory loss     Objective:     /61 (BP Location: Right arm, Patient Position: Sitting)   Pulse 80   Ht 5' (1.524 m)   Wt 63.8 kg (140 lb 9.6 oz)    BMI 27.46 kg/m²     Physical Exam:  Eyes: Pupils are equal, round, and reactive to light.   Neck: Supple. No mass  Cardiovascular: Regular rhythm . No murmur   Pulmonary/Chest: Diffuse mild crepitance  Abdominal: Soft.  Prominent liver. Nontender, no guarding. Positive bowel sounds   Musculoskeletal: No deformity.  1+ pretibial edema   Psychiatric: Alert and oriented    Assessment:     1. Cirrhosis of liver without ascites, unspecified hepatic cirrhosis type    2. Generalized abdominal pain    3. Projectile vomiting with nausea    4. H/O adenomatous polyp of colon    5. Gastroesophageal reflux disease, esophagitis presence not specified    6. S/P PTCA (percutaneous transluminal coronary angioplasty)      Plan:     Neha was seen today for abdominal pain, diarrhea and nausea.    Diagnoses and all orders for this visit:    Cirrhosis of liver without ascites, unspecified hepatic cirrhosis type    Generalized abdominal pain    Projectile vomiting with nausea    H/O adenomatous polyp of colon    Gastroesophageal reflux disease, esophagitis presence not specified    S/P PTCA (percutaneous transluminal coronary angioplasty)      Impression:  The episodic abdominal pain/vomiting could be indicative of transient small bowel obstruction.  She has had multiple abdominal surgeries in the past.  I advised him that if she had a prolonged episode to consider emergency room visit for imaging studies.  Her cirrhosis appears stable.    Plan:  Outpatient follow-up.  Advised smoking cessation

## 2017-07-09 NOTE — ED NOTES
Pt complains of abd pain, 1 pm today,constant pain, pt threw up 3 times today, pt states had a bm earlier today

## 2017-07-09 NOTE — ED PROVIDER NOTES
Encounter Date: 7/9/2017       History     Chief Complaint   Patient presents with    Abdominal Pain     Pt complains of abd pain, 1 pm today,constant pain, pt stated she threw up 3 times today, pt stated she had a bm earlier today     HPI: 65 YO F with a PMH of DM, CAD, GERD, HTN and hyperlipidemia presents to the ED with a CC of abdominal pain that started at 1 PM today. Patient states that for the last few months she has been getting these intermittent episodes of abdominal pain. The pain lasts for 1-2 days then resolves. Patient reported 3 episodes of vomiting today and last normal BM yesterday, reports feeling mildly constipated today. She was told by PCP to come to the ED the next time she has an episode to get some imaging. Patient denies any chest pain, no shortness of breath, no fever, no chills, no dysuria. Hx of cholecystectomy, hysterectomy and appendectomy.           Review of patient's allergies indicates:   Allergen Reactions    Farxiga [dapagliflozin] Anaphylaxis     Couldn't void    Chantix [varenicline]     Clindamycin     Itraconazole      Past Medical History:   Diagnosis Date    Calcification of aorta     Coronary artery disease     Diabetes mellitus     GERD (gastroesophageal reflux disease)     Hyperlipidemia     Hypertension     Renal disorder     Thyroid disease     Ventral hernia 05/08/2017    on CT, referred to Dr Gaytan     Past Surgical History:   Procedure Laterality Date    APPENDECTOMY      CARDIAC SURGERY      CAROTID ENDARTERECTOMY      Wolf    CHOLECYSTECTOMY      CORONARY ARTERY BYPASS GRAFT      2005    HYSTERECTOMY      SHOULDER SURGERY Right 2017    TONSILLECTOMY      ULNAR NERVE TRANSPOSITION Right 03/2016     Family History   Problem Relation Age of Onset    Pulmonary embolism Mother     Diabetes Father      Social History   Substance Use Topics    Smoking status: Current Every Day Smoker     Years: 0.50    Smokeless tobacco: Never Used    Alcohol  use No     Review of Systems   Constitutional: Negative for chills and fever.   HENT: Negative for ear pain and sore throat.    Eyes: Negative for visual disturbance.   Respiratory: Negative for shortness of breath.    Cardiovascular: Negative for chest pain.   Gastrointestinal: Positive for abdominal pain, nausea and vomiting.   Genitourinary: Negative for dysuria.   Musculoskeletal: Negative for back pain.   Skin: Negative for rash.   Neurological: Negative for dizziness, weakness and numbness.   Hematological: Does not bruise/bleed easily.   All other systems reviewed and are negative.      Physical Exam     Initial Vitals [07/09/17 1729]   BP Pulse Resp Temp SpO2   (!) 147/52 79 20 98.7 °F (37.1 °C) 98 %      MAP       83.67         Physical Exam    Nursing note and vitals reviewed.  Constitutional: She appears well-developed and well-nourished. No distress.   HENT:   Head: Normocephalic and atraumatic.   Right Ear: External ear normal.   Left Ear: External ear normal.   Mouth/Throat: Oropharynx is clear and moist.   Eyes: EOM are normal. Pupils are equal, round, and reactive to light.   Neck: Normal range of motion. Neck supple.   Cardiovascular: Normal rate, regular rhythm, normal heart sounds and intact distal pulses.   Pulmonary/Chest: Breath sounds normal. No respiratory distress. She has no rhonchi.   Abdominal: Soft. Normal appearance and bowel sounds are normal. There is tenderness in the epigastric area. There is no rigidity, no rebound, no guarding, no tenderness at McBurney's point and negative Corea's sign.       Healing hernia repair just superior to epigastric area. Healing well without erythema, no drainage, no evidence of infection   Musculoskeletal: Normal range of motion.   Lymphadenopathy:     She has no cervical adenopathy.   Neurological: She is alert and oriented to person, place, and time. She has normal strength.   Skin: Skin is warm. Capillary refill takes less than 2 seconds.          ED Course   Procedures  Labs Reviewed   CBC W/ AUTO DIFFERENTIAL - Abnormal; Notable for the following:        Result Value    WBC 17.39 (*)     RBC 3.79 (*)     Hemoglobin 11.5 (*)     Hematocrit 31.5 (*)     MCHC 36.5 (*)     Gran # 14.6 (*)     Mono # 1.2 (*)     Gran% 84.1 (*)     Lymph% 7.8 (*)     All other components within normal limits   COMPREHENSIVE METABOLIC PANEL - Abnormal; Notable for the following:     Sodium 131 (*)     Chloride 93 (*)     Glucose 208 (*)     BUN, Bld 31 (*)     eGFR if  56.5 (*)     eGFR if non  49.0 (*)     All other components within normal limits   URINALYSIS - Abnormal; Notable for the following:     Appearance, UA Hazy (*)     Protein, UA 2+ (*)     Occult Blood UA Trace (*)     Leukocytes, UA 2+ (*)     All other components within normal limits   URINALYSIS MICROSCOPIC - Abnormal; Notable for the following:     WBC, UA 6 (*)     Bacteria, UA Few (*)     Yeast, UA Moderate (*)     Non-Squam Epith 2 (*)     All other components within normal limits   LIPASE     Imaging Results          CT Abdomen Pelvis  Without Contrast (Final result)  Result time 07/09/17 20:11:30   Procedure changed from CT Abdomen Pelvis With Contrast     Final result by Juno Mendes MD (07/09/17 20:11:30)                 Impression:     No acute process. No detrimental change compared prior exam.      Electronically signed by: JUNO MENDES MD  Date:     07/09/17  Time:    20:11              Narrative:    Examination: CT of the abdomen and pelvis without contrast.    History:   Abdominal pain    Comparison: 05/08/2017      Findings:     No evidence of urolithiasis or obstructive nephropathy. No perinephric inflammatory changes are identified.    The liver again demonstrates a nodular contour, suggestive of cirrhosis. No ascites. No splenomegaly. Pancreas and adrenal glands are unremarkable.    No recurrent ventral hernia identified, status post mesh  fixation.    No free fluid, adenopathy, or mesenteric inflammatory change identified. Lung bases are clear.                                    Medical Decision Making:   Initial Assessment:   65 YO F with a PMH of DM, CAD, GERD, HTN and hyperlipidemia presents to the ED with a CC of abdominal pain that started at 1 PM today. Patient states that for the last few months she has been getting these intermittent episodes of abdominal pain. The pain lasts for 1-2 days then resolves. Patient reported 3 episodes of vomiting today and last normal BM yesterday, reports feeling mildly constipated today. She was told by PCP to come to the ED the next time she has an episode to get some imaging. Patient denies any chest pain, no shortness of breath, no fever, no chills, no dysuria. Hx of cholecystectomy, hysterectomy and appendectomy.   Differential Diagnosis:   SBO, pancreatitis, abdominal adhesions, diverticulitis, gastritis, UTI, ulcer  Clinical Tests:   Lab Tests: Ordered  Radiological Study: Ordered  ED Management:  Patient feeling better after Morphine and Zofran. Unable to perform CT scan with IV contrast due to low GFR. CT without contrast ordered in place. Discussed results with patient. Patient seen and evaluated by Dr. Escalera. Will tx UTI and elevated WBC count with Rocephin here in the ED. Patient will be discharged home on Cipro, Bentyl, Norco and Colace and will call Dr. Wilkerson tomorrow to schedule a follow up. Given return precautions.  Other:   I discussed test(s) with the performing physician.              Attending Attestation:     Physician Attestation Statement for NP/PA:   I have conducted a face to face encounter with this patient in addition to the NP/PA, due to Medical Complexity    Other NP/PA Attestation Additions:    History of Present Illness: agree   Physical Exam: agree                  ED Course     Clinical Impression:   The primary encounter diagnosis was Abdominal pain, unspecified location.  A diagnosis of Urinary tract infection without hematuria, site unspecified was also pertinent to this visit.    Disposition:   Disposition: Discharged  Condition: Stable                        Jenn Robbins PA-C  07/09/17 2121       Bebeto Escalera MD  07/10/17 0410

## 2017-07-21 PROBLEM — R19.7 DIARRHEA OF PRESUMED INFECTIOUS ORIGIN: Status: ACTIVE | Noted: 2017-01-01

## 2017-07-21 PROBLEM — R11.10 VOMITING AND DIARRHEA: Status: ACTIVE | Noted: 2017-01-01

## 2017-07-21 PROBLEM — R19.7 INTRACTABLE DIARRHEA: Status: ACTIVE | Noted: 2017-01-01

## 2017-07-21 PROBLEM — R19.7 VOMITING AND DIARRHEA: Status: ACTIVE | Noted: 2017-01-01

## 2017-07-21 NOTE — ED PROVIDER NOTES
"Encounter Date: 7/21/2017       History     Chief Complaint   Patient presents with    Abdominal Pain     iintermittenet abdominal pain nausea and vomiting since April. Todays "attack" began this morning      66-year-old female presents to emergency department with multiple episodes of vomiting and diarrhea that started today.  Patient has chronic abdominal pain due to cirrhosis and ventral hernia.  States that abdominal pain is worsened.  It is under the care of Dr. Wilkerson for abdominal complaints.  Patient was placed on Bentyl and docusate and last emergency room visit.  Patient states she was unable to take Bentyl because a cause altered mental status.  Patient was also given Cipro for urinary tract infection during that visit that she completed that treatment course.  Denies urinary symptoms.  States most of the abdominal pain is in right upper quadrant.            Review of patient's allergies indicates:   Allergen Reactions    Farxiga [dapagliflozin] Anaphylaxis     Couldn't void    Chantix [varenicline]     Clindamycin     Itraconazole      Past Medical History:   Diagnosis Date    Calcification of aorta     Coronary artery disease     Diabetes mellitus     GERD (gastroesophageal reflux disease)     Hyperlipidemia     Hypertension     Renal disorder     Thyroid disease     Ventral hernia 05/08/2017    on CT, referred to Dr Gaytan     Past Surgical History:   Procedure Laterality Date    APPENDECTOMY      CARDIAC SURGERY      CAROTID ENDARTERECTOMY      Wolf    CHOLECYSTECTOMY      CORONARY ARTERY BYPASS GRAFT      2005    HYSTERECTOMY      SHOULDER SURGERY Right 2017    TONSILLECTOMY      ULNAR NERVE TRANSPOSITION Right 03/2016     Family History   Problem Relation Age of Onset    Pulmonary embolism Mother     Diabetes Father      Social History   Substance Use Topics    Smoking status: Current Every Day Smoker     Years: 0.50    Smokeless tobacco: Never Used    Alcohol use No "     Review of Systems   Constitutional: Positive for appetite change (decreased appetite), fatigue and fever. Negative for activity change and chills.   HENT: Negative for sore throat.    Respiratory: Negative for shortness of breath.    Cardiovascular: Negative for chest pain.   Gastrointestinal: Positive for abdominal pain, diarrhea, nausea, rectal pain and vomiting.   Genitourinary: Negative for dysuria.   Musculoskeletal: Negative for back pain.   Skin: Negative for rash.   Neurological: Negative for weakness.   Hematological: Does not bruise/bleed easily.   All other systems reviewed and are negative.      Physical Exam     Initial Vitals [07/21/17 1813]   BP Pulse Resp Temp SpO2   (!) 145/62 81 16 -- 96 %      MAP       89.67         Physical Exam    Nursing note and vitals reviewed.  Constitutional: She appears well-developed and well-nourished. No distress.   HENT:   Head: Normocephalic.   Mouth/Throat: Mucous membranes are dry.   Eyes: Conjunctivae are normal.   Neck: Normal range of motion. Neck supple.   Cardiovascular: Normal rate.   Pulmonary/Chest: Breath sounds normal. No respiratory distress.   Abdominal: Soft. Bowel sounds are normal. She exhibits no distension and no abdominal bruit. There is generalized tenderness and tenderness in the right upper quadrant. There is no rigidity, no rebound and no guarding. No hernia.   Healing surgical incision to the upper abdomen from ventral hernia repair 6 weeks ago.  There is also an old midline abdominal scar.   Neurological: She is alert and oriented to person, place, and time.   Skin: Skin is warm and dry.   Psychiatric: She has a normal mood and affect. Her behavior is normal. Judgment and thought content normal.         ED Course   Procedures  Labs Reviewed   CBC W/ AUTO DIFFERENTIAL - Abnormal; Notable for the following:        Result Value    WBC 26.88 (*)     RBC 3.18 (*)     Hemoglobin 9.9 (*)     Hematocrit 28.5 (*)     MCH 31.1 (*)     RDW 16.7 (*)      Gran # 23.6 (*)     Mono # 1.8 (*)     Gran% 88.8 (*)     Lymph% 4.3 (*)     All other components within normal limits   COMPREHENSIVE METABOLIC PANEL - Abnormal; Notable for the following:     CO2 22 (*)     Glucose 255 (*)     BUN, Bld 27 (*)     Creatinine 1.66 (*)      (*)     ALT 51 (*)     Anion Gap 17 (*)     eGFR if  37.0 (*)     eGFR if non  32.1 (*)     All other components within normal limits   LIPASE - Abnormal; Notable for the following:     Lipase 479 (*)     All other components within normal limits   URINALYSIS   OCCULT BLOOD X 1, STOOL             Medical Decision Making:   Initial Assessment:   66-year-old female presents to emergency department with multiple episodes of vomiting and diarrhea that started today.  Patient has chronic abdominal pain due to cirrhosis and ventral hernia.  States that abdominal pain is worsened.  It is under the care of Dr. Wilkerson for abdominal complaints.  Patient was placed on Bentyl and docusate and last emergency room visit.  Patient states she was unable to take Bentyl because a cause altered mental status.  Patient was also given Cipro for urinary tract infection during that visit that she completed that treatment course.  Denies urinary symptoms.  States most of the abdominal pain is in right upper quadrant.  Abdomen is soft on exam.  She has generalized tenderness but more in the right upper quadrant.  Nondistended.  Abdominal incision appears be healing well minimal redness and no drainage.  Bowel sounds active.  Patient appears to be resting comfortably and on exam.  Afebrile.  Differential Diagnosis:   Chronic abdominal pain, GERD, sepsis, cirrhosis, colitis, constipation, viral syndrome,  Clinical Tests:   Lab Tests: Ordered  Radiological Study: Ordered  ED Management:  Patient reports no relief from Reglan.  X-ray are unremarkable.  Elevated white count, slightly low H&H, elevated BUN and creatinine, slightly  acidotic on labs.  A serum lipase is also elevated.  Other:   I discussed test(s) with the performing physician.       <> Summary of the Findings: I discussed his case with Dr. Miller, he will now assumed care of this patient.  Patient awaiting CT and occult blood results.              Attending Attestation:     Physician Attestation Statement for NP/PA:   I have conducted a face to face encounter with this patient in addition to the NP/PA, due to Medical Complexity    Other NP/PA Attestation Additions:      Medical Decision Making: Emergency room evaluation of a 66-year-old female presents with multiple episodes of vomiting and diarrhea.  Patient had notable leukocytosis, mild elevation in BUN/creatinine, mild drop in H&H, occult blood positive stool.  Patient may also have UTI with leuk esterase positive.  Patient given Rocephin and Flagyl IV, additional IV fluid bolus.  This time I believe the patient should be admitted for further care including IV antibiotics, IV fluids, stool studies.  She may also need GI consultation and serial CBCs.                 ED Course   Comment By Time   EKG: NSR at 91 bpm, nl axis, nl intervals, no hypertrophy, no ST-T changes as read by me. There are no significant changes in comparison to previous EKG on 11/21/2014.  Lucas Miller MD 07/21 0256     Clinical Impression:   The primary encounter diagnosis was Vomiting and diarrhea. A diagnosis of Leukocytosis, unspecified type was also pertinent to this visit.    Disposition:   Disposition: Admitted  Condition: Fair                        Lucas Miller MD  07/21/17 9137

## 2017-07-22 PROBLEM — K92.2 GI BLEED: Status: ACTIVE | Noted: 2017-01-01

## 2017-07-22 PROBLEM — R10.11 RIGHT UPPER QUADRANT ABDOMINAL PAIN: Status: ACTIVE | Noted: 2017-01-01

## 2017-07-22 NOTE — H&P
"[facility] Medicine  History & Physical    Patient Name: Neha Conrad  MRN: [id number]  Admission Date: 7/21/2017  Attending Physician: Robin Thacker MD   Primary Care Provider: Saeed Armando MD         Patient information was obtained from patient, past medical records and ER records.     Subjective:     Principal Problem:Intractable diarrhea    Chief Complaint:   Chief Complaint   Patient presents with    Abdominal Pain     iintermittenet abdominal pain nausea and vomiting since April. Todays "attack" began this morning         HPI: Neha Conrad is a 66 year female with PMHx of Diabetes Tybe 2, Coronary Artery Disease (S/P CABG (2005), Hypertension, Hyperlipidemia, Carotid Disease (S/P Carotid Ashu arterectomy), GERD, Cirrhosis,  and Ventral Hernia Repair wit Mesh.  She lives at home with her daughters. She does have chronic, intermittent abdominal pan. She  is under the care of Dr. Wilkerson for abdominal complaints. She was seen in the ED on 7/9/17 for worsening abdominal pain and started on bentyl and she was also treated with a 7 day course of ciprofloxacin for a urinary tract infection    She presented to [facility] ED with complaints of nausea, vomiting and diarrhea that started about 11 in the morning yesterday.  The nausea and vomiting began first. She vomited about 5 times throughout the day.  The abdominal pain and diarrhea started later in the day. She states that could not tolerated po to take the Bentyl.  She had about 7 episodes of diarrhea which was dark in color, but it is often dark because she takes iron.  States most of the abdominal pain is in right upper quadrant.      ED workup showed notable leukocytosis (WBC 26.88), mild elevation in BUN (27)/creatinine (1.66), mild drop in Hgb (9.9) and Hct (28.5). She had an occult blood positive stool.   Her urine was leuk esterase positive, but has many squamous cells in the specimin. Her transaminases are " elevates as well,  (up from 48) and ALT 51 (up from 32) and her Lipase mildly elevated at 479.  Abdominal CT shows her cirrhosis with portal hypertension and perisplenic varices.  There is minimal fluid collections between ventral mesh and left lobe of the liver with no abscess.  Gastric wall is thickened revealing gastritis. She was given Rocephin and Flagyl IV, additional IV fluid bolus.   She is admitted to [facility] for diarrhea presumed to be infectious, mild WERO and elevated liver enzymes for IV Fluids, IV antibiotics and  stool studies.     Past Medical History:   Diagnosis Date    Calcification of aorta     Coronary artery disease     Diabetes mellitus     GERD (gastroesophageal reflux disease)     Hyperlipidemia     Hypertension     Renal disorder     Thyroid disease     Ventral hernia 05/08/2017    on CT, referred to Dr Gaytan       Past Surgical History:   Procedure Laterality Date    APPENDECTOMY      CARDIAC SURGERY      CAROTID ENDARTERECTOMY      Wolf    CHOLECYSTECTOMY      CORONARY ARTERY BYPASS GRAFT      2005    HYSTERECTOMY      SHOULDER SURGERY Right 2017    TONSILLECTOMY      ULNAR NERVE TRANSPOSITION Right 03/2016       Review of patient's allergies indicates:   Allergen Reactions    Farxiga [dapagliflozin] Anaphylaxis     Couldn't void    Chantix [varenicline]     Clindamycin     Itraconazole        No current facility-administered medications on file prior to encounter.      Current Outpatient Prescriptions on File Prior to Encounter   Medication Sig    alendronate (FOSAMAX) 70 MG tablet TAKE 1 TABLET BY MOUTH EVERY 7 DAYS    alprazolam (XANAX) 0.5 MG tablet Take 1 tablet (0.5 mg total) by mouth 2 (two) times daily. as needed for anxiety.    aspirin 81 MG Chew Take 81 mg by mouth once daily.    calcium-vitamin D3 500 mg(1,250mg) -200 unit per tablet Take 1 tablet by mouth Daily.    clopidogrel (PLAVIX) 75 mg tablet Take 1 tablet (75 mg total) by mouth  once daily.    CONTOUR NEXT STRIPS Strp 1 strip 3 (three) times daily.     cyanocobalamin, vitamin B-12, (VITAMIN B-12) 1,000 mcg Subl Place 1,000 mcg under the tongue once daily.    dicyclomine (BENTYL) 20 mg tablet Take 1 tablet (20 mg total) by mouth 2 (two) times daily.    docusate sodium (COLACE) 100 MG capsule Take 1 capsule (100 mg total) by mouth 2 (two) times daily.    ferrous sulfate 325 mg (65 mg iron) Tab tablet Take 1 tablet (325 mg total) by mouth 2 (two) times daily.    fish oil-omega-3 fatty acids 300-1,000 mg capsule Take 2 g by mouth 2 (two) times daily.    furosemide (LASIX) 40 MG tablet TAKE 1 TABLET BY MOUTH ONCE DAILY    gabapentin (NEURONTIN) 300 MG capsule Take 1 capsule (300 mg total) by mouth every evening. For nerve pain in abdomen    glimepiride (AMARYL) 4 MG tablet Take 1 tablet (4 mg total) by mouth before breakfast. For diabetes    hydrocodone-acetaminophen 5-325mg (NORCO) 5-325 mg per tablet Take 1 tablet by mouth every 6 (six) hours as needed for Pain.    levothyroxine (SYNTHROID) 88 MCG tablet Take 1 tablet (88 mcg total) by mouth once daily.    lisinopril (PRINIVIL,ZESTRIL) 40 MG tablet TAKE 1 TABLET(40MG) BY MOUTH TWICE DAILY    magnesium oxide (MAG-OX) 400 mg tablet Take 1 tablet (400 mg total) by mouth 2 (two) times daily.    metformin (GLUCOPHAGE) 500 MG tablet TAKE 2 TABLETS BY MOUTH TWICE DAILY    metoprolol tartrate (LOPRESSOR) 25 MG tablet TAKE 1 TABLET BY MOUTH TWICE DAILY    MICROLET LANCET Misc 3 (three) times daily.     nitroGLYCERIN (NITROSTAT) 0.4 MG SL tablet Place 0.4 mg under the tongue every 5 (five) minutes as needed for Chest pain.    olanzapine (ZYPREXA) 10 MG tablet Take 5 mg by mouth once daily.    olanzapine (ZYPREXA) 10 MG tablet TAKE 1/2 TABLET BY MOUTH EVERY EVENING    simvastatin (ZOCOR) 40 MG tablet TAKE 1 TABLET BY MOUTH EVERY DAY    SITagliptan (JANUVIA) 100 MG Tab Take 1 tablet (100 mg total) by mouth once daily.    venlafaxine  (EFFEXOR-XR) 75 MG 24 hr capsule TAKE ONE CAPSULE BY MOUTH ONCE DAILY     Family History     Problem Relation (Age of Onset)    Diabetes Father    Pulmonary embolism Mother        Social History Main Topics    Smoking status: Current Every Day Smoker     Years: 0.50    Smokeless tobacco: Never Used    Alcohol use No    Drug use: Unknown    Sexual activity: Not on file     Review of Systems   Constitutional: Negative for chills, diaphoresis and fever.   HENT: Negative for sore throat and trouble swallowing.    Eyes: Negative for photophobia and visual disturbance.   Respiratory: Negative for cough, shortness of breath and wheezing.    Cardiovascular: Negative for chest pain and palpitations.   Gastrointestinal: Positive for abdominal pain (Right upper quadrant mostly), blood in stool (Black stool), diarrhea, nausea and vomiting. Negative for constipation.   Endocrine: Negative for polydipsia and polyphagia.   Genitourinary: Negative for decreased urine volume, dysuria, hematuria and urgency.   Musculoskeletal: Negative for joint swelling, neck pain and neck stiffness.   Neurological: Negative for weakness, numbness and headaches.   Psychiatric/Behavioral: Negative for agitation, dysphoric mood and suicidal ideas.     Objective:     Vital Signs (Most Recent):  Temp: 98.3 °F (36.8 °C) (07/22/17 0800)  Pulse: 104 (07/22/17 0800)  Resp: 20 (07/22/17 0800)  BP: 121/60 (07/22/17 0800)  SpO2: (!) 94 % (07/22/17 0319) Vital Signs (24h Range):  Temp:  [98 °F (36.7 °C)-98.4 °F (36.9 °C)] 98.3 °F (36.8 °C)  Pulse:  [] 104  Resp:  [16-20] 20  SpO2:  [94 %-97 %] 94 %  BP: (121-161)/(58-72) 121/60     Weight: 61.1 kg (134 lb 11.2 oz)  Body mass index is 26.31 kg/m².    Physical Exam   Constitutional: She is oriented to person, place, and time. She appears well-developed and well-nourished. No distress.   HENT:   Head: Normocephalic and atraumatic.   Mouth/Throat: Oropharynx is clear and moist. No oropharyngeal exudate.    Eyes: Conjunctivae are normal. Pupils are equal, round, and reactive to light. No scleral icterus.   Neck: Neck supple.   Cardiovascular: Normal rate, regular rhythm, normal heart sounds and intact distal pulses.  Exam reveals no gallop and no friction rub.    No murmur heard.  Pulmonary/Chest: Effort normal and breath sounds normal. No respiratory distress. She has no wheezes. She has no rales.   Abdominal: Soft. Bowel sounds are normal. She exhibits no distension. There is tenderness (right upper quadrant). There is guarding. There is no rebound.   Musculoskeletal: She exhibits no edema, tenderness or deformity.   Neurological: She is alert and oriented to person, place, and time. She exhibits normal muscle tone.   Skin: Skin is warm and dry. Capillary refill takes 2 to 3 seconds. No rash noted. She is not diaphoretic.   Psychiatric: She has a normal mood and affect. Her behavior is normal.   Nursing note and vitals reviewed.       Significant Labs:   Bilirubin:   Recent Labs  Lab 07/09/17 1815 07/21/17 1810 07/22/17 0527   BILITOT 0.7 0.9 0.3     CBC:   Recent Labs  Lab 07/21/17 1810 07/22/17 0527   WBC 26.88* 17.01*   HGB 9.9* 8.3*   HCT 28.5* 25.1*    215     CMP:   Recent Labs  Lab 07/21/17 1810 07/22/17 0527    133*   K 4.3 4.9   CL 98 102   CO2 22* 18*   * 203*   BUN 27* 27*   CREATININE 1.66* 1.6*   CALCIUM 9.4 8.1*   PROT 8.1 6.8   ALBUMIN 4.7 3.6   BILITOT 0.9 0.3   ALKPHOS 110 87   * 46*   ALT 51* 24   ANIONGAP 17* 13   EGFRNONAA 32.1* 34*     Coagulation:   Recent Labs  Lab 07/22/17 0527   INR 1.0   APTT 24.3     Lipase:   Recent Labs  Lab 07/21/17 1810   LIPASE 479*     Urine Studies:   Recent Labs  Lab 07/21/17 2151   COLORU [place]   APPEARANCEUA Clear   PHUR 5.0   SPECGRAV 1.020   PROTEINUA 2+*   GLUCUA Negative   KETONESU Negative   BILIRUBINUA 1+*   OCCULTUA 3+*   NITRITE Negative   UROBILINOGEN Negative   LEUKOCYTESUR 3+*   RBCUA 6*   WBCUA 15*   BACTERIA  Moderate*   SQUAMEPITHEL moderate   HYALINECASTS 15*       Significant Imaging: I have reviewed all pertinent imaging results/findings within the past 24 hours.   Imaging Results          CT Abdomen Pelvis  Without Contrast (Final result)  Result time 07/21/17 22:43:51   Procedure changed from CT Abdomen Pelvis With Contrast     Final result by Ana Ford MD (07/21/17 22:43:51)                 Impression:          1.  Cirrhosis.  Portal hypertension with perisplenic varices and splenorenal shunt.  2.  Marked aortoiliac atherosclerosis with multifocal high-grade infrarenal abdominal aortic stenosis.  3.  Interval upper ventral abdominal hernia repair with mesh with mild stranding/post surgical scarring abdominal wall and omentum.  Minimal fluid collection between the mesh and left lobe liver.  No abscess.  4.  Nonspecific mild distal gastric wall thickening.  Gastritis?  No ulceration.  5.  Absent gallbladder.  Stable common bile duct dilatation, 10 mm.      All CT scans at this facility use dose modulation, iterative reconstruction, and/or weight based dosing when appropriate to reduce radiation dose to as low as reasonably achievable.      Electronically signed by: ANA FORD  Date:     07/21/17  Time:    22:43              Narrative:    Exam: CT ABDOMEN PELVIS WITHOUT CONTRAST     Indication: Right upper quadrant pain.    Technique: Abdominal and pelvic CT without contrast.  Coronal and sagittal reformations.    Comparison Study: 4/22/2015.    Findings: The lung bases are clear.  Coronary artery calcifications.  Normal size heart.  Cirrhotic liver with nodular surface contour.  Normal size spleen.  Normal pancreas.  Bilateral mild adrenal gland thickening characteristic of hyperplasia without discrete nodularity.  Distal left renal artery calcification.  No urolithiasis.  Normal kidneys.    There is marked aortic atherosclerosis with multifocal infrarenal high-grade aortic stenosis.  Perisplenic varices with  splenorenal shunt suggesting portal hypertension.  No lymphadenopathy.    Gallbladder is absent.  Stable common bile duct dilatation, 10 mm.  No intrahepatic bile duct dilatation.    Interval upper ventral abdominal hernia repair with mesh with post surgical scarring some stranding in the abdominal wall and omentum.  There is minimal fluid in between the mesh and the left lobe liver.  No drainable fluid collection.  No abscess.    Mild distal gastric wall thickening is nonspecific.  Gastritis not excluded.  No ulceration.  Normal duodenum.  No bowel obstruction.  The appendix is absent.  The colon is collapsed.  Small intramural lipoma proximal sigmoid colon,  1.5 x 1.4 cm.  The GI tract is otherwise unremarkable.  Multilevel mild lumbar spondylosis.    Pelvic CT.  The pelvic ring is intact.  The pelvic bowel loops are unremarkable.  Ureters and urinary bladder are normal.  Hysterectomy.  Over thoracic.  No pelvic mass, free fluid, or lymphadenopathy.  Iliac Necrosis.                             X-Ray Abdomen Flat And Erect (Final result)  Result time 07/21/17 19:02:30    Final result by Yesy Fitzgerald MD (07/21/17 19:02:30)                 Impression:         No acute findings.      Electronically signed by: YESY FITZGERALD MD  Date:     07/21/17  Time:    19:02              Narrative:    Exam: Flat and erect abdomen x-ray 2 views    History:     Generalized abdominal pain    Findings:     The bowel gas pattern appears normal.  Aortic atherosclerosis. No acute soft tissue or bony abnormality is identified.                                Assessment/Plan:     * Intractable diarrhea    Nausea and Vomiting, Right Upper Quadrant Pain, Diarrhea Presumed Infectious  Cirrhosis of the Liver without Coma   Patient with a history of cirrhosis and ventral hernia mesh with intermittent chronic abdominal pain, now with 1 day of nausea and vomiting (5 times) and diarrhea (7 times). She was on antibiotics (Cipro) a couple of weeks ago.    --She was given a 1.5 liter bolus of normal saline. Continue @ 75 ml/hr  --She was give Metronidazole 500 mg IV, Continue BID  --She was give ceftriaxone 1 gm IV--Give cefepime 1 gm daily  --Check Stool for C-Diff, Stool Culture, Stool for O & P  --Continue Bentyl and prn nausea and pain medication            GI bleed    Patient has + Stool for OB and a drop in Hgb 9.9 on admit to 8.3 this morning and Hct 28.5 on admit to 25.1 this morning  --Monitor daily          History of stroke    Patient takes Pavix 75 mg daily. Holding for + Stool for OB and drop in H/H          Gastroesophageal reflux disease without esophagitis    Famotidine 20 mg IV daily          Essential hypertension    CAD, S/P CABG, HLD  -161  Continue home metoprolol 25 mg BID and simvastatin 40 mg daily  Telemetry Monitoring            VTE Risk Mitigation         Ordered     Medium Risk of VTE  Once      07/22/17 0319     Place BETH hose  Until discontinued      07/22/17 0319     Place sequential compression device  Until discontinued      07/22/17 0319        Ayah Lerma NP  Department of Hospital Medicine   [facility]

## 2017-07-22 NOTE — ASSESSMENT & PLAN NOTE
Nausea and Vomiting, Right Upper Quadrant Pain, Diarrhea Presumed Infectious  Cirrhosis of the Liver without Coma   Patient with a history of cirrhosis and ventral hernia mesh with intermittent chronic abdominal pain, now with 1 day of nausea and vomiting (5 times) and diarrhea (7 times). She was on antibiotics (Cipro) a couple of weeks ago.   --She was given a 1.5 liter bolus of normal saline. Continue @ 75 ml/hr  --She was give Metronidazole 500 mg IV, Continue BID  --She was give ceftriaxone 1 gm IV--Give cefepime 1 gm daily  --Check Stool for C-Diff, Stool Culture, Stool for O & P  --Continue Bentyl and prn nausea and pain medication

## 2017-07-22 NOTE — PLAN OF CARE
Patient arrived in room 462 via [employer] @0315. NO acute distress. VSS. Tele monitor, BETH, SCD, fall risk and allergy bands applied. Nurse reviewed the plan of care and instructed patient to call for assistance. Fall prevention agreement obtained and placed in the chart.  Room near nursing station, bed alarm set, bed in low position, wheels locked,  side rails up x2, call light within reach. NP Florentin notified. Continue to monitor

## 2017-07-22 NOTE — SUBJECTIVE & OBJECTIVE
Past Medical History:   Diagnosis Date    Calcification of aorta     Coronary artery disease     Diabetes mellitus     GERD (gastroesophageal reflux disease)     Hyperlipidemia     Hypertension     Renal disorder     Thyroid disease     Ventral hernia 05/08/2017    on CT, referred to Dr Gaytan       Past Surgical History:   Procedure Laterality Date    APPENDECTOMY      CARDIAC SURGERY      CAROTID ENDARTERECTOMY      Wolf    CHOLECYSTECTOMY      CORONARY ARTERY BYPASS GRAFT      2005    HYSTERECTOMY      SHOULDER SURGERY Right 2017    TONSILLECTOMY      ULNAR NERVE TRANSPOSITION Right 03/2016       Review of patient's allergies indicates:   Allergen Reactions    Farxiga [dapagliflozin] Anaphylaxis     Couldn't void    Chantix [varenicline]     Clindamycin     Itraconazole        No current facility-administered medications on file prior to encounter.      Current Outpatient Prescriptions on File Prior to Encounter   Medication Sig    alendronate (FOSAMAX) 70 MG tablet TAKE 1 TABLET BY MOUTH EVERY 7 DAYS    alprazolam (XANAX) 0.5 MG tablet Take 1 tablet (0.5 mg total) by mouth 2 (two) times daily. as needed for anxiety.    aspirin 81 MG Chew Take 81 mg by mouth once daily.    calcium-vitamin D3 500 mg(1,250mg) -200 unit per tablet Take 1 tablet by mouth Daily.    clopidogrel (PLAVIX) 75 mg tablet Take 1 tablet (75 mg total) by mouth once daily.    CONTOUR NEXT STRIPS Strp 1 strip 3 (three) times daily.     cyanocobalamin, vitamin B-12, (VITAMIN B-12) 1,000 mcg Subl Place 1,000 mcg under the tongue once daily.    dicyclomine (BENTYL) 20 mg tablet Take 1 tablet (20 mg total) by mouth 2 (two) times daily.    docusate sodium (COLACE) 100 MG capsule Take 1 capsule (100 mg total) by mouth 2 (two) times daily.    ferrous sulfate 325 mg (65 mg iron) Tab tablet Take 1 tablet (325 mg total) by mouth 2 (two) times daily.    fish oil-omega-3 fatty acids 300-1,000 mg capsule Take 2 g by mouth 2  (two) times daily.    furosemide (LASIX) 40 MG tablet TAKE 1 TABLET BY MOUTH ONCE DAILY    gabapentin (NEURONTIN) 300 MG capsule Take 1 capsule (300 mg total) by mouth every evening. For nerve pain in abdomen    glimepiride (AMARYL) 4 MG tablet Take 1 tablet (4 mg total) by mouth before breakfast. For diabetes    hydrocodone-acetaminophen 5-325mg (NORCO) 5-325 mg per tablet Take 1 tablet by mouth every 6 (six) hours as needed for Pain.    levothyroxine (SYNTHROID) 88 MCG tablet Take 1 tablet (88 mcg total) by mouth once daily.    lisinopril (PRINIVIL,ZESTRIL) 40 MG tablet TAKE 1 TABLET(40MG) BY MOUTH TWICE DAILY    magnesium oxide (MAG-OX) 400 mg tablet Take 1 tablet (400 mg total) by mouth 2 (two) times daily.    metformin (GLUCOPHAGE) 500 MG tablet TAKE 2 TABLETS BY MOUTH TWICE DAILY    metoprolol tartrate (LOPRESSOR) 25 MG tablet TAKE 1 TABLET BY MOUTH TWICE DAILY    MICROLET LANCET Misc 3 (three) times daily.     nitroGLYCERIN (NITROSTAT) 0.4 MG SL tablet Place 0.4 mg under the tongue every 5 (five) minutes as needed for Chest pain.    olanzapine (ZYPREXA) 10 MG tablet Take 5 mg by mouth once daily.    olanzapine (ZYPREXA) 10 MG tablet TAKE 1/2 TABLET BY MOUTH EVERY EVENING    simvastatin (ZOCOR) 40 MG tablet TAKE 1 TABLET BY MOUTH EVERY DAY    SITagliptan (JANUVIA) 100 MG Tab Take 1 tablet (100 mg total) by mouth once daily.    venlafaxine (EFFEXOR-XR) 75 MG 24 hr capsule TAKE ONE CAPSULE BY MOUTH ONCE DAILY     Family History     Problem Relation (Age of Onset)    Diabetes Father    Pulmonary embolism Mother        Social History Main Topics    Smoking status: Current Every Day Smoker     Years: 0.50    Smokeless tobacco: Never Used    Alcohol use No    Drug use: Unknown    Sexual activity: Not on file     Review of Systems   Constitutional: Negative for chills, diaphoresis and fever.   HENT: Negative for sore throat and trouble swallowing.    Eyes: Negative for photophobia and visual  disturbance.   Respiratory: Negative for cough, shortness of breath and wheezing.    Cardiovascular: Negative for chest pain and palpitations.   Gastrointestinal: Positive for abdominal pain (Right upper quadrant mostly), blood in stool (Black stool), diarrhea, nausea and vomiting. Negative for constipation.   Endocrine: Negative for polydipsia and polyphagia.   Genitourinary: Negative for decreased urine volume, dysuria, hematuria and urgency.   Musculoskeletal: Negative for joint swelling, neck pain and neck stiffness.   Neurological: Negative for weakness, numbness and headaches.   Psychiatric/Behavioral: Negative for agitation, dysphoric mood and suicidal ideas.     Objective:     Vital Signs (Most Recent):  Temp: 98.3 °F (36.8 °C) (07/22/17 0800)  Pulse: 104 (07/22/17 0800)  Resp: 20 (07/22/17 0800)  BP: 121/60 (07/22/17 0800)  SpO2: (!) 94 % (07/22/17 0319) Vital Signs (24h Range):  Temp:  [98 °F (36.7 °C)-98.4 °F (36.9 °C)] 98.3 °F (36.8 °C)  Pulse:  [] 104  Resp:  [16-20] 20  SpO2:  [94 %-97 %] 94 %  BP: (121-161)/(58-72) 121/60     Weight: 61.1 kg (134 lb 11.2 oz)  Body mass index is 26.31 kg/m².    Physical Exam   Constitutional: She is oriented to person, place, and time. She appears well-developed and well-nourished. No distress.   HENT:   Head: Normocephalic and atraumatic.   Mouth/Throat: Oropharynx is clear and moist. No oropharyngeal exudate.   Eyes: Conjunctivae are normal. Pupils are equal, round, and reactive to light. No scleral icterus.   Neck: Neck supple.   Cardiovascular: Normal rate, regular rhythm, normal heart sounds and intact distal pulses.  Exam reveals no gallop and no friction rub.    No murmur heard.  Pulmonary/Chest: Effort normal and breath sounds normal. No respiratory distress. She has no wheezes. She has no rales.   Abdominal: Soft. Bowel sounds are normal. She exhibits no distension. There is tenderness (right upper quadrant). There is guarding. There is no rebound.    Musculoskeletal: She exhibits no edema, tenderness or deformity.   Neurological: She is alert and oriented to person, place, and time. She exhibits normal muscle tone.   Skin: Skin is warm and dry. Capillary refill takes 2 to 3 seconds. No rash noted. She is not diaphoretic.   Psychiatric: She has a normal mood and affect. Her behavior is normal.   Nursing note and vitals reviewed.       Significant Labs:   Bilirubin:   Recent Labs  Lab 07/09/17 1815 07/21/17 1810 07/22/17 0527   BILITOT 0.7 0.9 0.3     CBC:   Recent Labs  Lab 07/21/17 1810 07/22/17 0527   WBC 26.88* 17.01*   HGB 9.9* 8.3*   HCT 28.5* 25.1*    215     CMP:   Recent Labs  Lab 07/21/17 1810 07/22/17 0527    133*   K 4.3 4.9   CL 98 102   CO2 22* 18*   * 203*   BUN 27* 27*   CREATININE 1.66* 1.6*   CALCIUM 9.4 8.1*   PROT 8.1 6.8   ALBUMIN 4.7 3.6   BILITOT 0.9 0.3   ALKPHOS 110 87   * 46*   ALT 51* 24   ANIONGAP 17* 13   EGFRNONAA 32.1* 34*     Coagulation:   Recent Labs  Lab 07/22/17 0527   INR 1.0   APTT 24.3     Lipase:   Recent Labs  Lab 07/21/17 1810   LIPASE 479*     Urine Studies:   Recent Labs  Lab 07/21/17 2151   COLORU [place]   APPEARANCEUA Clear   PHUR 5.0   SPECGRAV 1.020   PROTEINUA 2+*   GLUCUA Negative   KETONESU Negative   BILIRUBINUA 1+*   OCCULTUA 3+*   NITRITE Negative   UROBILINOGEN Negative   LEUKOCYTESUR 3+*   RBCUA 6*   WBCUA 15*   BACTERIA Moderate*   SQUAMEPITHEL moderate   HYALINECASTS 15*       Significant Imaging: I have reviewed all pertinent imaging results/findings within the past 24 hours.   Imaging Results          CT Abdomen Pelvis  Without Contrast (Final result)  Result time 07/21/17 22:43:51   Procedure changed from CT Abdomen Pelvis With Contrast     Final result by Homer Ford MD (07/21/17 22:43:51)                 Impression:          1.  Cirrhosis.  Portal hypertension with perisplenic varices and splenorenal shunt.  2.  Marked aortoiliac atherosclerosis with multifocal  high-grade infrarenal abdominal aortic stenosis.  3.  Interval upper ventral abdominal hernia repair with mesh with mild stranding/post surgical scarring abdominal wall and omentum.  Minimal fluid collection between the mesh and left lobe liver.  No abscess.  4.  Nonspecific mild distal gastric wall thickening.  Gastritis?  No ulceration.  5.  Absent gallbladder.  Stable common bile duct dilatation, 10 mm.      All CT scans at this facility use dose modulation, iterative reconstruction, and/or weight based dosing when appropriate to reduce radiation dose to as low as reasonably achievable.      Electronically signed by: ANA COBB  Date:     07/21/17  Time:    22:43              Narrative:    Exam: CT ABDOMEN PELVIS WITHOUT CONTRAST     Indication: Right upper quadrant pain.    Technique: Abdominal and pelvic CT without contrast.  Coronal and sagittal reformations.    Comparison Study: 4/22/2015.    Findings: The lung bases are clear.  Coronary artery calcifications.  Normal size heart.  Cirrhotic liver with nodular surface contour.  Normal size spleen.  Normal pancreas.  Bilateral mild adrenal gland thickening characteristic of hyperplasia without discrete nodularity.  Distal left renal artery calcification.  No urolithiasis.  Normal kidneys.    There is marked aortic atherosclerosis with multifocal infrarenal high-grade aortic stenosis.  Perisplenic varices with splenorenal shunt suggesting portal hypertension.  No lymphadenopathy.    Gallbladder is absent.  Stable common bile duct dilatation, 10 mm.  No intrahepatic bile duct dilatation.    Interval upper ventral abdominal hernia repair with mesh with post surgical scarring some stranding in the abdominal wall and omentum.  There is minimal fluid in between the mesh and the left lobe liver.  No drainable fluid collection.  No abscess.    Mild distal gastric wall thickening is nonspecific.  Gastritis not excluded.  No ulceration.  Normal duodenum.  No bowel  obstruction.  The appendix is absent.  The colon is collapsed.  Small intramural lipoma proximal sigmoid colon,  1.5 x 1.4 cm.  The GI tract is otherwise unremarkable.  Multilevel mild lumbar spondylosis.    Pelvic CT.  The pelvic ring is intact.  The pelvic bowel loops are unremarkable.  Ureters and urinary bladder are normal.  Hysterectomy.  Over thoracic.  No pelvic mass, free fluid, or lymphadenopathy.  Iliac Necrosis.                             X-Ray Abdomen Flat And Erect (Final result)  Result time 07/21/17 19:02:30    Final result by Yesy Fitzgerald MD (07/21/17 19:02:30)                 Impression:         No acute findings.      Electronically signed by: YESY FITZGERALD MD  Date:     07/21/17  Time:    19:02              Narrative:    Exam: Flat and erect abdomen x-ray 2 views    History:     Generalized abdominal pain    Findings:     The bowel gas pattern appears normal.  Aortic atherosclerosis. No acute soft tissue or bony abnormality is identified.

## 2017-07-22 NOTE — ED NOTES
SHY Remy notified of pending admission and need for bed.  Patient and family also made aware, all questions answered.

## 2017-07-22 NOTE — PLAN OF CARE
Problem: Patient Care Overview  Goal: Plan of Care Review  Outcome: Ongoing (interventions implemented as appropriate)  Plan of care reviewed with patient. Patient verbalized understanding. Patient has not had any episodes of diarrhea; unable to collect specimen to rule out c diff at this time. Patient has no complaints of nausea, pain, discomfort. IV fluids infusing. Glucose checks performed; patient covered as needed. Patient NSR-sinus tachycardia on telemetry monitoring, HR 70's-100's, with no true red alarms noted. Bed is low and locked, bed alarm is activated, call light is within reach. Patient has been instructed to call if in need of assistance. Verbalized understanding.

## 2017-07-22 NOTE — PLAN OF CARE
Problem: Patient Care Overview  Goal: Plan of Care Review  Outcome: Ongoing (interventions implemented as appropriate)  Pt on room air with SpO2 93 %. No respiratory distress noted. Will continue to monitor.

## 2017-07-22 NOTE — HPI
Neha Conrad is a 66 year female with PMHx of Diabetes Tybe 2, Coronary Artery Disease (S/P CABG (2005), Hypertension, Hyperlipidemia, Carotid Disease (S/P Carotid Ashu arterectomy), GERD, Cirrhosis,  and Ventral Hernia Repair wit Mesh.  She lives at home with her daughters. She does have chronic, intermittent abdominal pan. She  is under the care of Dr. Wilkerson for abdominal complaints. She was seen in the ED on 7/9/17 for worsening abdominal pain and started on bentyl and she was also treated with a 7 day course of ciprofloxacin for a urinary tract infection    She presented to [facility] ED with complaints of nausea, vomiting and diarrhea that started about 11 in the morning yesterday.  The nausea and vomiting began first. She vomited about 5 times throughout the day.  The abdominal pain and diarrhea started later in the day. She states that could not tolerated po to take the Bentyl.  She had about 7 episodes of diarrhea which was dark in color, but it is often dark because she takes iron.  States most of the abdominal pain is in right upper quadrant.      ED workup showed notable leukocytosis (WBC 26.88), mild elevation in BUN (27)/creatinine (1.66), mild drop in Hgb (9.9) and Hct (28.5). She had an occult blood positive stool.  Her urine was leuk esterase positive, but has many squamous cells in the specimin. Her transaminases are elevates as well,  (up from 48) and ALT 51 (up from 32) and her Lipase mildly elevated at 479.  Abdominal CT shows her cirrhosis with portal hypertension and perisplenic varices.  There is minimal fluid collections between ventral mesh and left lobe of the liver with no abscess.  Gastric wall is thickened revealing gastritis. She was given Rocephin and Flagyl IV, additional IV fluid bolus.  She is admitted to [facility] for diarrhea presumed to be infectious, mild WERO and elevated liver enzymes for IV Fluids, IV antibiotics and  stool studies.

## 2017-07-22 NOTE — ASSESSMENT & PLAN NOTE
Patient has + Stool for OB and a drop in Hgb 9.9 on admit to 8.3 this morning and Hct 28.5 on admit to 25.1 this morning  --Monitor daily

## 2017-07-22 NOTE — ASSESSMENT & PLAN NOTE
CAD, S/P CABG, HLD  -161  Continue home metoprolol 25 mg BID and simvastatin 40 mg daily  Telemetry Monitoring

## 2017-07-23 PROBLEM — R11.10 VOMITING AND DIARRHEA: Status: RESOLVED | Noted: 2017-01-01 | Resolved: 2017-01-01

## 2017-07-23 PROBLEM — R10.11 RIGHT UPPER QUADRANT ABDOMINAL PAIN: Status: RESOLVED | Noted: 2017-01-01 | Resolved: 2017-01-01

## 2017-07-23 PROBLEM — A04.72 C. DIFFICILE DIARRHEA: Status: ACTIVE | Noted: 2017-01-01

## 2017-07-23 PROBLEM — R19.7 VOMITING AND DIARRHEA: Status: RESOLVED | Noted: 2017-01-01 | Resolved: 2017-01-01

## 2017-07-23 PROBLEM — K92.2 GI BLEED: Status: RESOLVED | Noted: 2017-01-01 | Resolved: 2017-01-01

## 2017-07-23 PROBLEM — A04.72 CLOSTRIDIUM DIFFICILE DIARRHEA: Status: ACTIVE | Noted: 2017-01-01

## 2017-07-23 NOTE — SUBJECTIVE & OBJECTIVE
Interval History:   She began to have diarrhea again yesterday afternoon. Stool for C-Diff is positive.  No growth in Blood Cultures for 2 days. Afebrile, no abdominal pain, tolerating liquids well and feeling better, although she had 5 liquid stools during the night.  Her WBC down to 11, BUN down to 10 and Creatinine down to 0.9. D/C IV fluids and encourage oral fluids. D/C Cefepime. Change Metronidazole from IV to PO and added oral Vancomycin 125 mg QID.     Review of Systems   Constitutional: Negative for chills, diaphoresis and fever.   Respiratory: Negative for cough, shortness of breath and wheezing.    Cardiovascular: Negative for chest pain and palpitations.   Gastrointestinal: Positive for diarrhea (5 liquid stool last night). Negative for abdominal pain, nausea and vomiting.     Objective:     Vital Signs (Most Recent):  Temp: 98.3 °F (36.8 °C) (07/23/17 0430)  Pulse: 94 (07/23/17 0430)  Resp: 19 (07/23/17 0430)  BP: 100/79 (07/23/17 0430)  SpO2: (!) 91 % (07/23/17 0420) Vital Signs (24h Range):  Temp:  [98.3 °F (36.8 °C)-99.3 °F (37.4 °C)] 98.3 °F (36.8 °C)  Pulse:  [] 94  Resp:  [18-20] 19  SpO2:  [91 %-96 %] 91 %  BP: (100-145)/(56-79) 100/79     Weight: 61.1 kg (134 lb 11.2 oz)  Body mass index is 26.31 kg/m².    Intake/Output Summary (Last 24 hours) at 07/23/17 0620  Last data filed at 07/23/17 0500   Gross per 24 hour   Intake          1620.75 ml   Output             2850 ml   Net         -1229.25 ml      Physical Exam   Constitutional: She is oriented to person, place, and time.   Eyes: Conjunctivae are normal. Pupils are equal, round, and reactive to light. No scleral icterus.   Cardiovascular: Normal rate, regular rhythm, normal heart sounds and intact distal pulses.    No murmur heard.  Pulmonary/Chest: Effort normal and breath sounds normal. No respiratory distress. She has no wheezes. She has no rales.   Abdominal: Soft. Bowel sounds are normal. She exhibits no distension. There is no  tenderness. There is no rebound and no guarding.   Musculoskeletal: She exhibits no edema.   Neurological: She is alert and oriented to person, place, and time.   Skin: Skin is warm. Capillary refill takes less than 2 seconds. She is diaphoretic.   Psychiatric: She has a normal mood and affect. Her behavior is normal.   Nursing note and vitals reviewed.       Significant Labs:   Blood Culture:   Recent Labs  Lab 07/21/17  2345 07/21/17  2356   LABBLOO No Growth to date No Growth to date     BMP:   Recent Labs  Lab 07/23/17  0442   *      K 4.7   *   CO2 17*   BUN 10   CREATININE 0.9   CALCIUM 8.1*   MG 1.9     CBC:   Recent Labs  Lab 07/21/17  1810 07/22/17  0527 07/23/17  0442   WBC 26.88* 17.01* 11.11   HGB 9.9* 8.3* 8.3*   HCT 28.5* 25.1* 24.4*    215 185     CMP:   Recent Labs  Lab 07/21/17 1810 07/22/17  0527 07/23/17  0442    133* 138   K 4.3 4.9 4.7   CL 98 102 113*   CO2 22* 18* 17*   * 203* 132*   BUN 27* 27* 10   CREATININE 1.66* 1.6* 0.9   CALCIUM 9.4 8.1* 8.1*   PROT 8.1 6.8  --    ALBUMIN 4.7 3.6  --    BILITOT 0.9 0.3  --    ALKPHOS 110 87  --    * 46*  --    ALT 51* 24  --    ANIONGAP 17* 13 8   EGFRNONAA 32.1* 34* >60     POCT Glucose:   Recent Labs  Lab 07/22/17  1546 07/22/17  2138 07/23/17  0535   POCTGLUCOSE 139* 165* 156*     Urine Culture: No results for input(s): LABURIN in the last 48 hours.  Urine Studies:   Recent Labs  Lab 07/21/17 2151   COLORU [place]   APPEARANCEUA Clear   PHUR 5.0   SPECGRAV 1.020   PROTEINUA 2+*   GLUCUA Negative   KETONESU Negative   BILIRUBINUA 1+*   OCCULTUA 3+*   NITRITE Negative   UROBILINOGEN Negative   LEUKOCYTESUR 3+*   RBCUA 6*   WBCUA 15*   BACTERIA Moderate*   SQUAMEPITHEL moderate   HYALINECASTS 15*       Significant Imaging:  No New

## 2017-07-23 NOTE — ASSESSMENT & PLAN NOTE
CAD, S/P CABG, HLD  -145  Continue home metoprolol 25 mg BID and simvastatin 40 mg daily  Telemetry Monitoring

## 2017-07-23 NOTE — PROGRESS NOTES
[facility] Hospital Medicine  Progress Note    Patient Name: Neha Conrad  MRN: [id number]  Patient Class: IP- Inpatient   Admission Date: 7/21/2017  Length of Stay: 2 days  Attending Physician: Robin Thacker MD  Primary Care Provider: Saeed Armando MD        Subjective:     Principal Problem:Intractable diarrhea    HPI:  Neha Conrad is a 66 year female with PMHx of Diabetes Tybe 2, Coronary Artery Disease (S/P CABG (2005), Hypertension, Hyperlipidemia, Carotid Disease (S/P Carotid Asuh arterectomy), GERD, Cirrhosis,  and Ventral Hernia Repair wit Mesh.  She lives at home with her daughters. She does have chronic, intermittent abdominal pan. She  is under the care of Dr. Wilkerson for abdominal complaints. She was seen in the ED on 7/9/17 for worsening abdominal pain and started on bentyl and she was also treated with a 7 day course of ciprofloxacin for a urinary tract infection    She presented to [facility] ED with complaints of nausea, vomiting and diarrhea that started about 11 in the morning yesterday.  The nausea and vomiting began first. She vomited about 5 times throughout the day.  The abdominal pain and diarrhea started later in the day. She states that could not tolerated po to take the Bentyl.  She had about 7 episodes of diarrhea which was dark in color, but it is often dark because she takes iron.  States most of the abdominal pain is in right upper quadrant.      ED workup showed notable leukocytosis (WBC 26.88), mild elevation in BUN (27)/creatinine (1.66), mild drop in Hgb (9.9) and Hct (28.5). She had an occult blood positive stool.   Her urine was leuk esterase positive, but has many squamous cells in the specimin. Her transaminases are elevates as well,  (up from 48) and ALT 51 (up from 32) and her Lipase mildly elevated at 479.  Abdominal CT shows her cirrhosis with portal hypertension and perisplenic varices.  There is minimal fluid collections between  ventral mesh and left lobe of the liver with no abscess.  Gastric wall is thickened revealing gastritis. She was given Rocephin and Flagyl IV, additional IV fluid bolus.   She is admitted to [facility] for diarrhea presumed to be infectious, mild WERO and elevated liver enzymes for IV Fluids, IV antibiotics and  stool studies.     Hospital Course:  No notes on file    Interval History:   She began to have diarrhea again yesterday afternoon. Stool for C-Diff is positive.  No growth in Blood Cultures for 2 days. Afebrile, no abdominal pain, tolerating liquids well and feeling better, although she had 5 liquid stools during the night.  Her WBC down to 11, BUN down to 10 and Creatinine down to 0.9. D/C IV fluids and encourage oral fluids. D/C Cefepime. Change Metronidazole from IV to PO and added oral Vancomycin 125 mg QID.     Review of Systems   Constitutional: Negative for chills, diaphoresis and fever.   Respiratory: Negative for cough, shortness of breath and wheezing.    Cardiovascular: Negative for chest pain and palpitations.   Gastrointestinal: Positive for diarrhea (5 liquid stool last night). Negative for abdominal pain, nausea and vomiting.     Objective:     Vital Signs (Most Recent):  Temp: 98.3 °F (36.8 °C) (07/23/17 0430)  Pulse: 94 (07/23/17 0430)  Resp: 19 (07/23/17 0430)  BP: 100/79 (07/23/17 0430)  SpO2: (!) 91 % (07/23/17 0420) Vital Signs (24h Range):  Temp:  [98.3 °F (36.8 °C)-99.3 °F (37.4 °C)] 98.3 °F (36.8 °C)  Pulse:  [] 94  Resp:  [18-20] 19  SpO2:  [91 %-96 %] 91 %  BP: (100-145)/(56-79) 100/79     Weight: 61.1 kg (134 lb 11.2 oz)  Body mass index is 26.31 kg/m².    Intake/Output Summary (Last 24 hours) at 07/23/17 0620  Last data filed at 07/23/17 0500   Gross per 24 hour   Intake          1620.75 ml   Output             2850 ml   Net         -1229.25 ml      Physical Exam   Constitutional: She is oriented to person, place, and time.   Eyes: Conjunctivae are normal. Pupils are  equal, round, and reactive to light. No scleral icterus.   Cardiovascular: Normal rate, regular rhythm, normal heart sounds and intact distal pulses.    No murmur heard.  Pulmonary/Chest: Effort normal and breath sounds normal. No respiratory distress. She has no wheezes. She has no rales.   Abdominal: Soft. Bowel sounds are normal. She exhibits no distension. There is no tenderness. There is no rebound and no guarding.   Musculoskeletal: She exhibits no edema.   Neurological: She is alert and oriented to person, place, and time.   Skin: Skin is warm. Capillary refill takes less than 2 seconds. She is diaphoretic.   Psychiatric: She has a normal mood and affect. Her behavior is normal.   Nursing note and vitals reviewed.       Significant Labs:   Blood Culture:   Recent Labs  Lab 07/21/17  2345 07/21/17  2356   LABBLOO No Growth to date No Growth to date     BMP:   Recent Labs  Lab 07/23/17  0442   *      K 4.7   *   CO2 17*   BUN 10   CREATININE 0.9   CALCIUM 8.1*   MG 1.9     CBC:   Recent Labs  Lab 07/21/17  1810 07/22/17  0527 07/23/17  0442   WBC 26.88* 17.01* 11.11   HGB 9.9* 8.3* 8.3*   HCT 28.5* 25.1* 24.4*    215 185     CMP:   Recent Labs  Lab 07/21/17  1810 07/22/17  0527 07/23/17  0442    133* 138   K 4.3 4.9 4.7   CL 98 102 113*   CO2 22* 18* 17*   * 203* 132*   BUN 27* 27* 10   CREATININE 1.66* 1.6* 0.9   CALCIUM 9.4 8.1* 8.1*   PROT 8.1 6.8  --    ALBUMIN 4.7 3.6  --    BILITOT 0.9 0.3  --    ALKPHOS 110 87  --    * 46*  --    ALT 51* 24  --    ANIONGAP 17* 13 8   EGFRNONAA 32.1* 34* >60     POCT Glucose:   Recent Labs  Lab 07/22/17  1546 07/22/17  2138 07/23/17  0535   POCTGLUCOSE 139* 165* 156*     Urine Culture: No results for input(s): LABURIN in the last 48 hours.  Urine Studies:   Recent Labs  Lab 07/21/17  2151   COLORU [place]   APPEARANCEUA Clear   PHUR 5.0   SPECGRAV 1.020   PROTEINUA 2+*   GLUCUA Negative   KETONESU Negative   BILIRUBINUA 1+*    OCCULTUA 3+*   NITRITE Negative   UROBILINOGEN Negative   LEUKOCYTESUR 3+*   RBCUA 6*   WBCUA 15*   BACTERIA Moderate*   SQUAMEPITHEL moderate   HYALINECASTS 15*       Significant Imaging:  No New    Assessment/Plan:      * Intractable diarrhea    Nausea and Vomiting, Right Upper Quadrant Pain, Diarrhea Presumed Infectious  Cirrhosis of the Liver without Coma   Patient with a history of cirrhosis and ventral hernia mesh with intermittent chronic abdominal pain, now with 1 day of nausea and vomiting (5 times) and diarrhea (7 times). She was on antibiotics (Cipro) a couple of weeks ago.   Stool Positive for C-Diff.  Had 5 liquid stool last night.  --BUN/Creatinine down to normal. Tolerating Oral Fluids well D/C IV fluids  --Metronidazole 500 mg PO TID, and Vancomycin 125 mg PO QID  --NGBC X's 2 days--D/C cefepime   --Stool Culture, Stool for O & P pending  --Continue Bentyl and prn nausea and pain medication            GI bleed    Patient has + Stool for OB and a drop in Hgb 9.9 from 8.3 likely dilutional.   Today Hgb 8.3, Hct 24.4 --Stable  --Monitor daily          History of stroke    Patient takes Pavix 75 mg daily. Holding for + Stool for OB and drop in H/H          Gastroesophageal reflux disease without esophagitis    Change Famotidine 20 mg to po BID          Essential hypertension    CAD, S/P CABG, HLD  -145  Continue home metoprolol 25 mg BID and simvastatin 40 mg daily  Telemetry Monitoring            VTE Risk Mitigation         Ordered     Medium Risk of VTE  Once      07/22/17 0319     Place BETH hose  Until discontinued      07/22/17 0319     Place sequential compression device  Until discontinued      07/22/17 0319          Ayah Lerma NP  Department of Hospital Medicine   [facility]

## 2017-07-23 NOTE — DISCHARGE INSTRUCTIONS
METRONIDAZOLE TABLETS OR CAPSULES (ENGLISH) View Edit Remove   VANCOMYCIN ORAL SOLUTION (ENGLISH) View Edit Remove   CLOSTRIDIUM DIFFICILE TOXIN (STOOL) (ENGLISH) View Edit Remove   INFECTION, CLOSTRIDIUM DIFFICILE (ENGLISH) View Edit Remove

## 2017-07-23 NOTE — PLAN OF CARE
07/22/17 1428   Discharge Assessment   Assessment Type Discharge Planning Assessment   Confirmed/corrected address and phone number on facesheet? Yes  ([address] [place] [address])   Assessment information obtained from? Patient   Prior to hospitilization cognitive status: Alert/Oriented   Prior to hospitalization functional status: Independent   Current cognitive status: Alert/Oriented   Current Functional Status: Independent   Arrived From [facility])   Lives With child(sharmaine), adult   Able to Return to Prior Arrangements yes   Is patient able to care for self after discharge? Yes   How many people do you have in your home that can help with your care after discharge? 2   Who are your caregiver(s) and their phone number(s)? Jing More(dtr)[phone]   Patient's perception of discharge disposition home or selfcare   Readmission Within The Last 30 Days no previous admission in last 30 days   Patient currently being followed by outpatient case management? No   Patient currently receives home health services? No   Does the patient currently use HME? No   Patient currently receives private duty nursing? No   Patient currently receives any other outside agency services? No   Equipment Currently Used at Home none   Do you have any problems affording any of your prescribed medications? No   Is the patient taking medications as prescribed? yes   Do you have any financial concerns preventing you from receiving the healthcare you need? No   Does the patient have transportation to healthcare appointments? Yes   Transportation Available family or friend will provide   On Dialysis? No   Does the patient receive services at the Coumadin Clinic? No   Are there any open cases? No   Discharge Plan A Home with family   Discharge Plan B Home Health   Patient/Family In Agreement With Plan yes

## 2017-07-23 NOTE — ASSESSMENT & PLAN NOTE
Patient has + Stool for OB and a drop in Hgb 9.9 from 8.3 likely dilutional.   Today Hgb 8.3, Hct 24.4 --Stable  --Monitor daily

## 2017-07-23 NOTE — PLAN OF CARE
D/c orders received, pt has no HH or DME needs noted.   Future Appointments  Date Time Provider Department Center   8/1/2017 4:00 PM Jam Wilkerson Jr., MD [facility] [place]   8/22/2017 9:30 AM Joseph Kirkpatrick MD [facility]

## 2017-07-23 NOTE — NURSING
Discharge instructions reviewed with patient and patient's family. Verbalized understanding. Learning evident with teach back method. PIV discontinued, catheter tip intact. Telemetry DC'd, returned to nurse's station. Patient DC'd home with daughters.

## 2017-07-23 NOTE — PLAN OF CARE
Problem: Patient Care Overview  Goal: Plan of Care Review  Outcome: Ongoing (interventions implemented as appropriate)  AAO x 3. Tele monitor continuous. No true red alarms noted. Contact precautions maintained. NS IV fluid infusing at 75 ml/h. Nurse reviewed the plan of care and instructed patient to call for assistance.  Patient verbalized understanding. Fall precautions maintained, room near nursing station, bed alarm set, bed in low and locked position, side rails up x 2, call light within reach. Continue to monitor

## 2017-07-23 NOTE — PLAN OF CARE
Problem: Patient Care Overview  Goal: Plan of Care Review  Outcome: Ongoing (interventions implemented as appropriate)  Plan of care reviewed with patient. Patient verbalized understanding. Patient continues to have diarrhea, but denies nausea, pain, discomfort. Patient NSR-sinus tachycardia on telemetry monitoring, HR 70's-100's, with no true red alarms noted. Bed is low and locked, bed alarm is activated, call light is within reach. Patient has been instructed to call if in need of assistance. Verbalized understanding. Possible DC this afternoon.

## 2017-07-23 NOTE — ASSESSMENT & PLAN NOTE
Nausea and Vomiting, Right Upper Quadrant Pain, Diarrhea Presumed Infectious  Cirrhosis of the Liver without Coma   Patient with a history of cirrhosis and ventral hernia mesh with intermittent chronic abdominal pain, now with 1 day of nausea and vomiting (5 times) and diarrhea (7 times). She was on antibiotics (Cipro) a couple of weeks ago.   Stool Positive for C-Diff.  Had 5 liquid stool last night.  --BUN/Creatinine down to normal. Tolerating Oral Fluids well D/C IV fluids  --Metronidazole 500 mg PO TID, and Vancomycin 125 mg PO QID  --NGBC X's 2 days--D/C cefepime   --Stool Culture, Stool for O & P pending  --Continue Bentyl and prn nausea and pain medication

## 2017-07-24 NOTE — DISCHARGE SUMMARY
[facility] Hospital Medicine  Discharge Summary      Patient Name: Neha Conrad  MRN: [id number]  Admission Date: 7/21/2017  Hospital Length of Stay: 2 days  Discharge Date and Time: 7/23/2017  4:50 PM  Attending Physician: Robin Thacker MD   Discharging Provider: Robin Thacker MD  Primary Care Provider: Saeed Armando MD      HPI:   Neha Conrad is a 66 year female with PMHx of Diabetes Tybe 2, Coronary Artery Disease (S/P CABG (2005), Hypertension, Hyperlipidemia, Carotid Disease (S/P Carotid Ashu arterectomy), GERD, Cirrhosis,  and Ventral Hernia Repair wit Mesh.  She lives at home with her daughters. She does have chronic, intermittent abdominal pan. She  is under the care of Dr. Wilkerson for abdominal complaints. She was seen in the ED on 7/9/17 for worsening abdominal pain and started on bentyl and she was also treated with a 7 day course of ciprofloxacin for a urinary tract infection    She presented to [facility] ED with complaints of nausea, vomiting and diarrhea that started about 11 in the morning yesterday.  The nausea and vomiting began first. She vomited about 5 times throughout the day.  The abdominal pain and diarrhea started later in the day. She states that could not tolerated po to take the Bentyl.  She had about 7 episodes of diarrhea which was dark in color, but it is often dark because she takes iron.  States most of the abdominal pain is in right upper quadrant.      ED workup showed notable leukocytosis (WBC 26.88), mild elevation in BUN (27)/creatinine (1.66), mild drop in Hgb (9.9) and Hct (28.5). She had an occult blood positive stool.   Her urine was leuk esterase positive, but has many squamous cells in the specimin. Her transaminases are elevates as well,  (up from 48) and ALT 51 (up from 32) and her Lipase mildly elevated at 479.  Abdominal CT shows her cirrhosis with portal hypertension and perisplenic varices.  There is minimal fluid  collections between ventral mesh and left lobe of the liver with no abscess.  Gastric wall is thickened revealing gastritis. She was given Rocephin and Flagyl IV, additional IV fluid bolus.   She is admitted to [facility] for diarrhea presumed to be infectious, mild WERO and elevated liver enzymes for IV Fluids, IV antibiotics and  stool studies.     * No surgery found *      Indwelling Lines/Drains at time of discharge:   Lines/Drains/Airways          No matching active lines, drains, or airways        Hospital Course:   Ms. Conrad was admitted to [facility] for continued care. She was given IV fluids and her renal function returned to normal. Her home lisinopril was held and will be restarted at 1/2 home dose. She continued to have diarrhea and was found to have Clostridium difficile colitis. She reports having been on antibiotics the week prior to her symptoms starting for a UTI. She was started on oral metronidazole and her diarrhea was improving. She will complete a 14 day course of antibiotics for the diarrhea. She was tolerating a bland diet and will advance as tolerated at home after discharge.      Consults:     Significant Diagnostic Studies: Labs:   CMP   Recent Labs  Lab 07/23/17  0442      K 4.7   *   CO2 17*   *   BUN 10   CREATININE 0.9   CALCIUM 8.1*   ANIONGAP 8   ESTGFRAFRICA >60   EGFRNONAA >60    and CBC   Recent Labs  Lab 07/23/17  0442   WBC 11.11   HGB 8.3*   HCT 24.4*        Microbiology:   Blood Culture   Lab Results   Component Value Date    LABBLOO No Growth to date 07/21/2017    LABBLOO No Growth to date 07/21/2017      and Clostridium Difficile Positive    Radiology: CT scan: CT ABDOMEN PELVIS WITHOUT CONTRAST:   Results for orders placed or performed during the hospital encounter of 07/21/17   CT Abdomen Pelvis  Without Contrast    Narrative    Exam: CT ABDOMEN PELVIS WITHOUT CONTRAST     Indication: Right upper quadrant pain.    Technique: Abdominal and  pelvic CT without contrast.  Coronal and sagittal reformations.    Comparison Study: 4/22/2015.    Findings: The lung bases are clear.  Coronary artery calcifications.  Normal size heart.  Cirrhotic liver with nodular surface contour.  Normal size spleen.  Normal pancreas.  Bilateral mild adrenal gland thickening characteristic of hyperplasia without discrete nodularity.  Distal left renal artery calcification.  No urolithiasis.  Normal kidneys.    There is marked aortic atherosclerosis with multifocal infrarenal high-grade aortic stenosis.  Perisplenic varices with splenorenal shunt suggesting portal hypertension.  No lymphadenopathy.    Gallbladder is absent.  Stable common bile duct dilatation, 10 mm.  No intrahepatic bile duct dilatation.    Interval upper ventral abdominal hernia repair with mesh with post surgical scarring some stranding in the abdominal wall and omentum.  There is minimal fluid in between the mesh and the left lobe liver.  No drainable fluid collection.  No abscess.    Mild distal gastric wall thickening is nonspecific.  Gastritis not excluded.  No ulceration.  Normal duodenum.  No bowel obstruction.  The appendix is absent.  The colon is collapsed.  Small intramural lipoma proximal sigmoid colon,  1.5 x 1.4 cm.  The GI tract is otherwise unremarkable.  Multilevel mild lumbar spondylosis.    Pelvic CT.  The pelvic ring is intact.  The pelvic bowel loops are unremarkable.  Ureters and urinary bladder are normal.  Hysterectomy.  Over thoracic.  No pelvic mass, free fluid, or lymphadenopathy.  Iliac Necrosis.    Impression    1.  Cirrhosis.  Portal hypertension with perisplenic varices and splenorenal shunt.  2.  Marked aortoiliac atherosclerosis with multifocal high-grade infrarenal abdominal aortic stenosis.  3.  Interval upper ventral abdominal hernia repair with mesh with mild stranding/post surgical scarring abdominal wall and omentum.  Minimal fluid collection between the mesh and left  lobe liver.  No abscess.  4.  Nonspecific mild distal gastric wall thickening.  Gastritis?  No ulceration.  5.  Absent gallbladder.  Stable common bile duct dilatation, 10 mm.      All CT scans at this facility use dose modulation, iterative reconstruction, and/or weight based dosing when appropriate to reduce radiation dose to as low as reasonably achievable.      Electronically signed by: ANA COBB  Date:     07/21/17  Time:    22:43        Pending Diagnostic Studies:     Procedure Component Value Units Date/Time    Stool Exam-Ova,Cysts,Parasites [[id number]] Collected:  07/22/17 5050    Order Status:  Sent Lab Status:  In process Updated:  07/22/17 2037    Specimen:  Stool from Stool         Final Active Diagnoses:    Diagnosis Date Noted POA    PRINCIPAL PROBLEM:  Clostridium difficile diarrhea [A04.7] 07/21/2017 Yes    Cirrhosis of liver without ascites [K74.60] 06/20/2017 Yes    S/P CABG (coronary artery bypass graft) [Z95.1] 03/04/2016 Not Applicable    History of stroke [Z86.73] 03/04/2016 Not Applicable    Coronary artery disease [I25.10]  Yes    Diabetes mellitus [E11.9]  Yes    Essential hypertension [I10] 09/23/2015 Yes    Gastroesophageal reflux disease without esophagitis [K21.9] 09/23/2015 Yes    Hyperlipidemia [E78.5] 09/23/2015 Yes      Problems Resolved During this Admission:    Diagnosis Date Noted Date Resolved POA    Right upper quadrant abdominal pain [R10.11] 07/22/2017 07/23/2017 Yes    GI bleed [K92.2] 07/22/2017 07/23/2017 Yes    Vomiting and diarrhea [R11.10, R19.7] 07/21/2017 07/23/2017 Yes      Essential hypertension    CAD, S/P CABG, HLD  Continue home metoprolol 25 mg BID and simvastatin 40 mg daily. Restarted home lisinopril at 20 mg daily on discharge.         * Clostridium difficile diarrhea    Nausea and Vomiting, Right Upper Quadrant Pain, Diarrhea Presumed Infectious  Cirrhosis of the Liver without Coma   Patient with a history of cirrhosis and ventral hernia mesh  with intermittent chronic abdominal pain, now with 1 day of nausea and vomiting (5 times) and diarrhea (7 times). She was on antibiotics (Cipro) a couple of weeks ago.   Stool Positive for C-Diff.  Had 5 liquid stool last night.  --BUN/Creatinine down to normal. Tolerating Oral Fluids well   --Metronidazole 500 mg PO TID  --Stool Culture, Stool for O & P pending            Discharged Condition: good    Disposition: Home or Self Care    Follow Up:  Follow-up Information     Saeed Armando MD. Schedule an appointment as soon as possible for a visit in 3 weeks.    Specialty:  Family Medicine  Contact information:  [address] [place] [id number] [phone]                 Patient Instructions:     Diet general     Activity as tolerated     Call MD for:  temperature >100.4     Call MD for:  persistent nausea and vomiting or diarrhea     Call MD for:  severe uncontrolled pain     Call MD for:  difficulty breathing or increased cough     Call MD for:  persistent dizziness, light-headedness, or visual disturbances     Call MD for:  increased confusion or weakness       Medications:  Reconciled Home Medications:   Discharge Medication List as of 7/23/2017  4:06 PM      START taking these medications    Details   metronidazole (FLAGYL) 500 MG tablet Take 1 tablet (500 mg total) by mouth every 8 (eight) hours., Starting Sun 7/23/2017, Until Sun 8/6/2017, Normal         CONTINUE these medications which have CHANGED    Details   lisinopril (PRINIVIL,ZESTRIL) 40 MG tablet TAKE 1/2 TABLET(20MG) BY MOUTH DAILY, No Print         CONTINUE these medications which have NOT CHANGED    Details   alendronate (FOSAMAX) 70 MG tablet TAKE 1 TABLET BY MOUTH EVERY 7 DAYS, Normal      alprazolam (XANAX) 0.5 MG tablet Take 1 tablet (0.5 mg total) by mouth 2 (two) times daily. as needed for anxiety., Starting Fri 6/16/2017, Normal      aspirin 81 MG Chew Take 81 mg by mouth once daily., Until Discontinued, Historical Med      calcium-vitamin  D3 500 mg(1,250mg) -200 unit per tablet Take 1 tablet by mouth Daily., Until Discontinued, Historical Med      clopidogrel (PLAVIX) 75 mg tablet Take 1 tablet (75 mg total) by mouth once daily., Starting 12/30/2016, Until Discontinued, Normal      CONTOUR NEXT STRIPS Strp 1 strip 3 (three) times daily. , Starting 8/21/2015, Until Discontinued, Historical Med      cyanocobalamin, vitamin B-12, (VITAMIN B-12) 1,000 mcg Subl Place 1,000 mcg under the tongue once daily., Starting 9/29/2016, Until Discontinued, OTC      dicyclomine (BENTYL) 20 mg tablet Take 1 tablet (20 mg total) by mouth 2 (two) times daily., Starting Sun 7/9/2017, Print      docusate sodium (COLACE) 100 MG capsule Take 1 capsule (100 mg total) by mouth 2 (two) times daily., Starting Sun 7/9/2017, Print      ferrous sulfate 325 mg (65 mg iron) Tab tablet Take 1 tablet (325 mg total) by mouth 2 (two) times daily., Starting 9/29/2016, Until Discontinued, OTC      fish oil-omega-3 fatty acids 300-1,000 mg capsule Take 2 g by mouth 2 (two) times daily., Until Discontinued, Historical Med      furosemide (LASIX) 40 MG tablet TAKE 1 TABLET BY MOUTH ONCE DAILY, Normal      gabapentin (NEURONTIN) 300 MG capsule Take 1 capsule (300 mg total) by mouth every evening. For nerve pain in abdomen, Starting 12/30/2016, Until Discontinued, Normal      glimepiride (AMARYL) 4 MG tablet Take 1 tablet (4 mg total) by mouth before breakfast. For diabetes, Starting 1/2/2017, Until Discontinued, Normal      hydrocodone-acetaminophen 5-325mg (NORCO) 5-325 mg per tablet Take 1 tablet by mouth every 6 (six) hours as needed for Pain., Starting Sun 7/9/2017, Print      levothyroxine (SYNTHROID) 88 MCG tablet Take 1 tablet (88 mcg total) by mouth once daily., Starting 4/15/2015, Until Discontinued, Normal      magnesium oxide (MAG-OX) 400 mg tablet Take 1 tablet (400 mg total) by mouth 2 (two) times daily., Starting 8/4/2016, Until Discontinued, OTC      metformin (GLUCOPHAGE) 500  MG tablet TAKE 2 TABLETS BY MOUTH TWICE DAILY, Normal      metoprolol tartrate (LOPRESSOR) 25 MG tablet TAKE 1 TABLET BY MOUTH TWICE DAILY, Normal      MICROLET LANCET Misc 3 (three) times daily. , Starting 8/26/2015, Until Discontinued, Historical Med      nitroGLYCERIN (NITROSTAT) 0.4 MG SL tablet Place 0.4 mg under the tongue every 5 (five) minutes as needed for Chest pain., Until Discontinued, Historical Med      !! olanzapine (ZYPREXA) 10 MG tablet Take 5 mg by mouth once daily., Starting 3/19/2017, Until Discontinued, Historical Med      !! olanzapine (ZYPREXA) 10 MG tablet TAKE 1/2 TABLET BY MOUTH EVERY EVENING, Normal      simvastatin (ZOCOR) 40 MG tablet TAKE 1 TABLET BY MOUTH EVERY DAY, Normal      SITagliptan (JANUVIA) 100 MG Tab Take 1 tablet (100 mg total) by mouth once daily., Starting 4/3/2017, Until Tue 4/3/18, Normal      venlafaxine (EFFEXOR-XR) 75 MG 24 hr capsule TAKE ONE CAPSULE BY MOUTH ONCE DAILY, Normal       !! - Potential duplicate medications found. Please discuss with provider.        Time spent on the discharge of patient: 35 minutes    Robin Thacker MD  Department of Hospital Medicine  [facility]

## 2017-07-24 NOTE — ASSESSMENT & PLAN NOTE
CAD, S/P CABG, HLD  Continue home metoprolol 25 mg BID and simvastatin 40 mg daily. Restarted home lisinopril at 20 mg daily on discharge.

## 2017-07-24 NOTE — HOSPITAL COURSE
Ms. Conrad was admitted to [facility] for continued care. She was given IV fluids and her renal function returned to normal. Her home lisinopril was held and will be restarted at 1/2 home dose. She continued to have diarrhea and was found to have Clostridium difficile colitis. She reports having been on antibiotics the week prior to her symptoms starting for a UTI. She was started on oral metronidazole and her diarrhea was improving. She will complete a 14 day course of antibiotics for the diarrhea. She was tolerating a bland diet and will advance as tolerated at home after discharge.

## 2017-07-24 NOTE — ASSESSMENT & PLAN NOTE
Nausea and Vomiting, Right Upper Quadrant Pain, Diarrhea Presumed Infectious  Cirrhosis of the Liver without Coma   Patient with a history of cirrhosis and ventral hernia mesh with intermittent chronic abdominal pain, now with 1 day of nausea and vomiting (5 times) and diarrhea (7 times). She was on antibiotics (Cipro) a couple of weeks ago.   Stool Positive for C-Diff.  Had 5 liquid stool last night.  --BUN/Creatinine down to normal. Tolerating Oral Fluids well   --Metronidazole 500 mg PO TID  --Stool Culture, Stool for O & P pending

## 2017-07-25 PROBLEM — K92.2 ACUTE UPPER GI BLEED: Status: ACTIVE | Noted: 2017-01-01

## 2017-07-25 PROBLEM — K74.60 CIRRHOSIS OF LIVER WITHOUT ASCITES: Chronic | Status: ACTIVE | Noted: 2017-01-01

## 2017-07-25 PROBLEM — I95.0 IDIOPATHIC HYPOTENSION: Status: ACTIVE | Noted: 2017-01-01

## 2017-07-25 PROBLEM — A04.72 C. DIFFICILE COLITIS: Status: ACTIVE | Noted: 2017-01-01

## 2017-07-25 NOTE — ED NOTES
Dr. Snyder at bedside to discuss POC with pt and family; possibility of blood administration discussed with pt and family-pt and family verbalized understanding; blood consent signed and a bedside.

## 2017-07-25 NOTE — SUBJECTIVE & OBJECTIVE
Past Medical History:   Diagnosis Date    Calcification of aorta     Cirrhosis of liver     Coronary artery disease     Diabetes mellitus     GERD (gastroesophageal reflux disease)     Hyperlipidemia     Hypertension     Renal disorder     Thyroid disease     Ventral hernia 05/08/2017    on CT, referred to Dr Gaytan       Past Surgical History:   Procedure Laterality Date    APPENDECTOMY      CARDIAC SURGERY      CAROTID ENDARTERECTOMY      Wolf    CHOLECYSTECTOMY      CORONARY ARTERY BYPASS GRAFT      2005    HYSTERECTOMY      SHOULDER SURGERY Right 2017    TONSILLECTOMY      ULNAR NERVE TRANSPOSITION Right 03/2016       Review of patient's allergies indicates:   Allergen Reactions    Farxiga [dapagliflozin] Anaphylaxis     Couldn't void    Chantix [varenicline]     Clindamycin     Itraconazole        No current facility-administered medications on file prior to encounter.      Current Outpatient Prescriptions on File Prior to Encounter   Medication Sig    alendronate (FOSAMAX) 70 MG tablet TAKE 1 TABLET BY MOUTH EVERY 7 DAYS (Patient taking differently: TAKE 1 TABLET BY MOUTH EVERY 7 DAYS on Sundays)    alprazolam (XANAX) 0.5 MG tablet Take 1 tablet (0.5 mg total) by mouth 2 (two) times daily. as needed for anxiety.    aspirin 81 MG Chew Take 81 mg by mouth once daily.    calcium-vitamin D3 500 mg(1,250mg) -200 unit per tablet Take 1 tablet by mouth every evening.     clopidogrel (PLAVIX) 75 mg tablet Take 1 tablet (75 mg total) by mouth once daily.    CONTOUR NEXT STRIPS Strp 1 strip 3 (three) times daily.     ferrous sulfate 325 mg (65 mg iron) Tab tablet Take 1 tablet (325 mg total) by mouth 2 (two) times daily.    fish oil-omega-3 fatty acids 300-1,000 mg capsule Take 2 g by mouth 2 (two) times daily.    furosemide (LASIX) 40 MG tablet TAKE 1 TABLET BY MOUTH ONCE DAILY    gabapentin (NEURONTIN) 300 MG capsule Take 1 capsule (300 mg total) by mouth every evening. For nerve pain  in abdomen    glimepiride (AMARYL) 4 MG tablet Take 1 tablet (4 mg total) by mouth before breakfast. For diabetes    hydrocodone-acetaminophen 5-325mg (NORCO) 5-325 mg per tablet Take 1 tablet by mouth every 6 (six) hours as needed for Pain.    levothyroxine (SYNTHROID) 88 MCG tablet Take 1 tablet (88 mcg total) by mouth once daily.    lisinopril (PRINIVIL,ZESTRIL) 40 MG tablet TAKE 1/2 TABLET(20MG) BY MOUTH DAILY    magnesium oxide (MAG-OX) 400 mg tablet Take 1 tablet (400 mg total) by mouth 2 (two) times daily.    metformin (GLUCOPHAGE) 500 MG tablet TAKE 2 TABLETS BY MOUTH TWICE DAILY    metoprolol tartrate (LOPRESSOR) 25 MG tablet TAKE 1 TABLET BY MOUTH TWICE DAILY    metronidazole (FLAGYL) 500 MG tablet Take 1 tablet (500 mg total) by mouth every 8 (eight) hours.    MICROLET LANCET Misc 3 (three) times daily.     olanzapine (ZYPREXA) 10 MG tablet TAKE 1/2 TABLET BY MOUTH EVERY EVENING    simvastatin (ZOCOR) 40 MG tablet TAKE 1 TABLET BY MOUTH EVERY DAY    SITagliptan (JANUVIA) 100 MG Tab Take 1 tablet (100 mg total) by mouth once daily.    venlafaxine (EFFEXOR-XR) 75 MG 24 hr capsule TAKE ONE CAPSULE BY MOUTH ONCE DAILY    cyanocobalamin, vitamin B-12, (VITAMIN B-12) 1,000 mcg Subl Place 1,000 mcg under the tongue once daily.    nitroGLYCERIN (NITROSTAT) 0.4 MG SL tablet Place 0.4 mg under the tongue every 5 (five) minutes as needed for Chest pain.    [DISCONTINUED] dicyclomine (BENTYL) 20 mg tablet Take 1 tablet (20 mg total) by mouth 2 (two) times daily.    [DISCONTINUED] docusate sodium (COLACE) 100 MG capsule Take 1 capsule (100 mg total) by mouth 2 (two) times daily.    [DISCONTINUED] olanzapine (ZYPREXA) 10 MG tablet Take 5 mg by mouth once daily.     Family History     Problem Relation (Age of Onset)    Cancer Brother    Diabetes Father    Heart disease Mother    Pulmonary embolism Mother        Social History Main Topics    Smoking status: Current Every Day Smoker     Packs/day: 0.50      Years: 0.50    Smokeless tobacco: Never Used    Alcohol use No    Drug use: No    Sexual activity: Not on file     Review of Systems   Constitutional: Negative for chills, fatigue and fever.   HENT: Negative for congestion, postnasal drip, sneezing and sore throat.    Eyes: Negative for discharge, redness and itching.   Respiratory: Negative for cough, shortness of breath and wheezing.    Cardiovascular: Negative for chest pain, palpitations and leg swelling.   Gastrointestinal: Positive for abdominal pain, blood in stool, diarrhea, nausea and vomiting. Negative for abdominal distention and constipation.   Endocrine: Negative for polydipsia, polyphagia and polyuria.   Genitourinary: Positive for decreased urine volume, difficulty urinating and dysuria. Negative for flank pain, frequency, hematuria and urgency.   Musculoskeletal: Negative for arthralgias, joint swelling and myalgias.   Skin: Negative for rash and wound.   Neurological: Negative for dizziness, syncope, weakness, light-headedness and headaches.   Psychiatric/Behavioral: Negative for agitation, confusion and sleep disturbance. The patient is nervous/anxious.      Objective:     Vital Signs (Most Recent):  Temp: 98.1 °F (36.7 °C) (07/25/17 1117)  Pulse: 89 (07/25/17 1749)  Resp: 17 (07/25/17 1749)  BP: (!) 114/45 (07/25/17 1749)  SpO2: 97 % (07/25/17 1749) Vital Signs (24h Range):  Temp:  [98.1 °F (36.7 °C)] 98.1 °F (36.7 °C)  Pulse:  [84-92] 89  Resp:  [12-20] 17  SpO2:  [96 %-100 %] 97 %  BP: ()/(45-64) 114/45     Weight: 63.5 kg (140 lb)  Body mass index is 27.34 kg/m².    Physical Exam   Constitutional: She is oriented to person, place, and time. She appears well-developed. No distress.   Obese   HENT:   Head: Normocephalic and atraumatic.   Mouth/Throat: Oropharynx is clear and moist. No oropharyngeal exudate.   Eyes: Conjunctivae and EOM are normal. Pupils are equal, round, and reactive to light. No scleral icterus.   Neck: Normal  range of motion. Neck supple. No JVD present. No thyromegaly present.   Cardiovascular: Normal rate and regular rhythm.    No murmur heard.  Pulmonary/Chest: Effort normal and breath sounds normal. No respiratory distress. She has no wheezes. She has no rales. She exhibits no tenderness.   Abdominal: Soft. She exhibits no distension. There is no tenderness. There is no rebound and no guarding.   Hypoactive bowel sounds    Musculoskeletal: Normal range of motion. She exhibits no edema.   Neurological: She is alert and oriented to person, place, and time.   Skin: Skin is warm and dry. Capillary refill takes less than 2 seconds. No rash noted. She is not diaphoretic.   Scattered ecchymoses on limbs   Psychiatric: She has a normal mood and affect. Thought content normal.        Significant Labs:   CBC:   Recent Labs  Lab 07/25/17  1210 07/25/17  1722   WBC 11.11 10.63   HGB 7.0* 6.2*   HCT 20.5* 18.0*    179     CMP:   Recent Labs  Lab 07/25/17  1210   *   K 4.8      CO2 19*   *   BUN 15   CREATININE 1.2   CALCIUM 10.6*   PROT 6.6   ALBUMIN 3.6   BILITOT 0.4   ALKPHOS 81   AST 19   ALT 14   ANIONGAP 10   EGFRNONAA 48*     Lactic Acid:   Recent Labs  Lab 07/25/17  1210 07/25/17  1712   LACTATE 3.4* 3.5*     Urine Studies:   Recent Labs  Lab 07/25/17  1210   COLORU Yellow   APPEARANCEUA Clear   PHUR 6.0   SPECGRAV 1.020   PROTEINUA Trace*   GLUCUA Negative   KETONESU Negative   BILIRUBINUA Negative   OCCULTUA 2+*   NITRITE Negative   UROBILINOGEN Negative   LEUKOCYTESUR Trace*   RBCUA 8*   WBCUA 1   BACTERIA None   SQUAMEPITHEL Moderate   HYALINECASTS 3*     Stool NEGATIVE for C. Diff, POSITIVE for blood     Significant Imaging:     Imaging Results          X-Ray Abdomen Flat And Erect (Final result)  Result time 07/25/17 14:16:51    Final result by Soy Ackerman MD (07/25/17 14:16:51)                 Impression:     No active process.      Electronically signed by: VANESSA ACKERMAN  MD  Date:     07/25/17  Time:    14:16              Narrative:    2 views of abdomen, postop sternotomy, scattered air-fluid levels, nondistended large and small bowel.

## 2017-07-25 NOTE — HPI
Neha oCnrad is a 66 year white woman with type 2 diabetes mellitus, coronary artery disease status post coronary artery bypass graft in 2005, hypertension, hyperlipidemia, carotid artery disease status post carotid endarterectomy, gastroesophageal reflux disease, non-alcoholic cirrhosis (diagnosed at [facility] in 2015), and history of ventral hernia repair with mesh.  She has chronic, intermittent abdominal pan.  She lives at home with her daughters Cathy and Jing.  Her primary care physician is Dr. Saeed Armando.  Her gastroenterologist is Dr. Jam Wilkerson.     She was seen in the ED on 7/9/17 for worsening abdominal pain and started on dicyclomine and she was also treated with a 7 day course of ciprofloxacin for a urinary tract infection.  She was last admitted at [facility] 7/21/17 - 7/23/2017 for nausea, vomiting and diarrhea with positive C. diff antigen and toxin (7/22/2017).  She was started on metronidazole and was feeling well on the day of discharge.  On the morning of 7/25/17, she awoke feeling well then started with abdominal cramping, vomiting, and dark semi-solid stool.  Her daughters encouraged her to return to the ED.     ED workup was significant for reduction in hemoglobin to 7 and hematocrit to 21.  Lactic acid was elevated at 3.5.  Blood cultures were collected and she was given IV fluids and oral vancomycin for presumed infectious diarrhea.   C. diff antigen and toxin were negative however.  She had occult blood positive stool.  She was admitted to [facility] with Gastroenterology consult.  Transfusion with 2 units of pRBCs were started in the ED.

## 2017-07-25 NOTE — ASSESSMENT & PLAN NOTE
S/P CABG 2005, History of Stroke  Denies CP. Hold ASA 81 mg and Plavix for acute GI bleeding. Resume statin. Monitor on telemetry.

## 2017-07-25 NOTE — ASSESSMENT & PLAN NOTE
Pt reports dark stool.  Noted drop in H&H from baseline 11&30 to 7&21. Fecal occult blood positive. Pt given IV PPI in ED. Continue BID PPI. Consult GI (Dr. Wilkerson). Transfuse 2 units of PRBC. Monitor.

## 2017-07-25 NOTE — ED PROVIDER NOTES
Encounter Date: 7/25/2017       History     Chief Complaint   Patient presents with    Abdominal Pain     Recently hospitalized with diagnosis of C-diff. Presents with c/o continued abdominal cramping with diarrhea. No fever.      66-year-old female presents emergency Department with abdominal pain, nausea vomiting and diarrhea.  She was just recently hospitalized with clostridial difficile, and discharged Sunday evening.  She was discharged home with a prescription for Flagyl, which family reports she has been compliant with.  She was doing well yesterday, but this morning she started having worsening abdominal pain, cramping and diarrhea.  She has had low-grade fever.  No vomiting.  She was able to tolerate dinner last night.  She arrives, hypotensive, with a blood pressure of 85/52 think she needs to use the bathroom immediately.          Review of patient's allergies indicates:   Allergen Reactions    Farxiga [dapagliflozin] Anaphylaxis     Couldn't void    Chantix [varenicline]     Clindamycin     Itraconazole      Past Medical History:   Diagnosis Date    Calcification of aorta     Coronary artery disease     Diabetes mellitus     GERD (gastroesophageal reflux disease)     Hyperlipidemia     Hypertension     Renal disorder     Thyroid disease     Ventral hernia 05/08/2017    on CT, referred to Dr Gaytan     Past Surgical History:   Procedure Laterality Date    APPENDECTOMY      CARDIAC SURGERY      CAROTID ENDARTERECTOMY      Wolf    CHOLECYSTECTOMY      CORONARY ARTERY BYPASS GRAFT      2005    HYSTERECTOMY      SHOULDER SURGERY Right 2017    TONSILLECTOMY      ULNAR NERVE TRANSPOSITION Right 03/2016     Family History   Problem Relation Age of Onset    Pulmonary embolism Mother     Diabetes Father      Social History   Substance Use Topics    Smoking status: Current Every Day Smoker     Years: 0.50    Smokeless tobacco: Never Used    Alcohol use No     Review of Systems    Gastrointestinal: Positive for abdominal pain, diarrhea and nausea.   All other systems reviewed and are negative.      Physical Exam     Initial Vitals [07/25/17 1117]   BP Pulse Resp Temp SpO2   (!) 85/52 84 16 98.1 °F (36.7 °C) 98 %      MAP       63         Physical Exam    Nursing note and vitals reviewed.  Constitutional: She appears distressed.   She appears older than stated age, but alert and sitting upright in wheelchair, distressed because she needs to use the bathroom   HENT:   Head: Normocephalic and atraumatic.   Eyes: EOM are normal. Pupils are equal, round, and reactive to light.   Neck: Normal range of motion. Neck supple.   Cardiovascular: Normal rate and normal heart sounds.   Pulmonary/Chest: Breath sounds normal.   Abdominal: Soft. There is no rebound and no guarding.   She allows for deep palpation of her abdomen without wincing  No guarding or rebound   Musculoskeletal: Normal range of motion.   Neurological: She is alert and oriented to person, place, and time. She has normal strength. No cranial nerve deficit.   Skin: Skin is warm and dry.   Psychiatric: She has a normal mood and affect. Her behavior is normal. Thought content normal.         ED Course   Critical Care  Date/Time: 7/25/2017 5:31 PM  Performed by: NASRIN PEREZ  Authorized by: NASRIN PEREZ   Total critical care time (exclusive of procedural time) : 0 minutes  Critical care was time spent personally by me on the following activities: review of old charts, re-evaluation of patient's condition, ordering and review of radiographic studies, obtaining history from patient or surrogate, examination of patient, evaluation of patient's response to treatment, discussions with primary provider, discussions with consultants and development of treatment plan with patient or surrogate.  Comments:  Critical care time spent in face-to-face contact with the patient, in addition to reviewing old records, ordering and reviewing blood  work and imaging, discussion with other healthcare providers, and updating family members.        Labs Reviewed   CBC W/ AUTO DIFFERENTIAL - Abnormal; Notable for the following:        Result Value    RBC 2.31 (*)     Hemoglobin 7.0 (*)     Hematocrit 20.5 (*)     RDW 17.6 (*)     Gran # 9.3 (*)     Gran% 83.6 (*)     Lymph% 11.2 (*)     All other components within normal limits   COMPREHENSIVE METABOLIC PANEL - Abnormal; Notable for the following:     Sodium 134 (*)     CO2 19 (*)     Glucose 175 (*)     Calcium 10.6 (*)     eGFR if  55 (*)     eGFR if non  48 (*)     All other components within normal limits   URINALYSIS - Abnormal; Notable for the following:     Protein, UA Trace (*)     Occult Blood UA 2+ (*)     Leukocytes, UA Trace (*)     All other components within normal limits   LACTIC ACID, PLASMA - Abnormal; Notable for the following:     Lactate (Lactic Acid) 3.4 (*)     All other components within normal limits   URINALYSIS MICROSCOPIC - Abnormal; Notable for the following:     RBC, UA 8 (*)     Yeast, UA Rare (*)     Hyaline Casts, UA 3 (*)     All other components within normal limits   OCCULT BLOOD X 1, STOOL - Abnormal; Notable for the following:     Occult Blood Positive (*)     All other components within normal limits   LACTIC ACID, PLASMA - Abnormal; Notable for the following:     Lactate (Lactic Acid) 3.5 (*)     All other components within normal limits    Narrative:     La   critical result(s) called and verbal readback obtained from   Skye Bui RN, 07/25/2017 17:42   CLOSTRIDIUM DIFFICILE   CULTURE, BLOOD   CULTURE, BLOOD   LIPASE   IRON AND TIBC   FERRITIN   FERRITIN   HEMOGLOBIN A1C   OCCULT BLOOD X 1, STOOL   CBC W/ AUTO DIFFERENTIAL   IRON AND TIBC   HEMOGLOBIN A1C   CBC W/ AUTO DIFFERENTIAL   HEMATOLOGY PROFILE (CBC WITHOUT DIFFERENTIAL)   TYPE & SCREEN   POCT GLUCOSE   POCT GLUCOSE MONITORING CONTINUOUS   PREPARE RBC SOFT             Medical  Decision Making:   Initial Assessment:   65 yo F hypotensive with abd pain/nausea and vomiting with recent C diff infection  Differential Diagnosis:   Dehydration, cramping, hypokalemia, medication failure, fulminant colitis, sepsis, renal failure, toxic megacolon, and bowel perforation with peritonitis  Clinical Tests:   Lab Tests: Ordered and Reviewed  ED Management:  IVF resuscitation  May need PO vanc    With only 200 cc of normal saline, patient's blood pressure bandar to 133/88, and part I think this was related to a blood pressure cuff that was too large.  Her hemoglobin is dropped from 8 down to 7, which is more impressive noting how dehydrated she appears.  It's possible this represents a GI bleed, however she does not have an elevated BUN.  Her white blood cell count is stable at 11, she has an elevation in her lactate.  She has been given IV fluid resuscitation, and addition to by mouth vancomycin for presumptive recurrence of C. difficile.  She is given IV pantoprazole, for possible GI bleed.  Patient will be admitted to the [facility], for further resuscitation.                   ED Course   repeat lactate from 3.4 to 3.5; given another liter of fluids. Awaiting repeat CBC for transfusion. Patient hemodynamically stable.  Clinical Impression:   The primary encounter diagnosis was Gastrointestinal hemorrhage, unspecified gastrointestinal hemorrhage type. Diagnoses of Hypotension, C. difficile colitis, and Anemia, unspecified type were also pertinent to this visit.                           Sun Snyder MD  07/25/17 2612       Sun Snyder MD  07/25/17 5583

## 2017-07-25 NOTE — ASSESSMENT & PLAN NOTE
Diagnosed on 7/22 and started on Flagyl.  Test on stool today are NEGATIVE for C. Diff Toxins and Antigen. Continue Flagyl to complete 14 day course for C. Difficile. Pt reports more semi-formed stool.

## 2017-07-25 NOTE — ASSESSMENT & PLAN NOTE
BP fluctuating.  On lisinopril 20 mg daily which will be held for slight WERO and metoprolol tartrate 25 mg BID which will be resumed with parameters. Monitor. PRN hydralazine.

## 2017-07-25 NOTE — H&P
[facility]  History & Physical    Patient Name: Neha Conrad  MRN: [id number]  Admission Date: 7/25/2017  Attending Physician: Luis Akins MD  Primary Care Provider: Saeed Armando MD         Patient information was obtained from patient, relative(s), past medical records and ER records.     Subjective:     Principal Problem:Acute upper GI bleed    Chief Complaint:   Chief Complaint   Patient presents with    Abdominal Pain     Recently hospitalized with diagnosis of C-diff. Presents with c/o continued abdominal cramping with diarrhea. No fever.         HPI: 66 year female with history of Diabetes Type 2, Coronary Artery Disease (S/P CABG (2005), Hypertension, Hyperlipidemia, Carotid Disease (S/P Carotid Endarterectomy), GERD, Cirrhosis (diagnosed at [facility] in 2015), and Ventral Hernia Repair with mesh.  She lives at home with her daughters Cathy and Jing. She has chronic, intermittent abdominal pan. She  is under the care of Dr. Wilkerson GI, for abdominal complaints. She was seen in the ED on 7/9/17 for worsening abdominal pain and started on bentyl and she was also treated with a 7 day course of ciprofloxacin for a urinary tract infection.  She was last admitted here 7/21 - 7/23/2017 for nausea, vomiting and diarrhea with positive C. Diff Antigen and Toxins (7/22/2017). She was started on Flagyl and was feeling well on the day of discharge.  This morning, she awoke feeling well then started with abdominal cramping, vomiting, and dark semi-solid stool. Her daughters encouraged her to present to ED.       ED workup significant for reduction in hemoglobin to 7 and hematocrit to 21.  Lactic acid elevated at 3.5.  Blood cultures collected and pt given IVF and oral vancomycin for infectious diarrhea.   C. Diff Angtigen and Toxins are NEGATIVE today.  She has occult blood positive stool. Admitting to [facility] with GI consult.  ER MD consented patient  for transfusion of 2 units of PRBC. Pt denies h/o blood transfusion.        Past Medical History:   Diagnosis Date    Calcification of aorta     Cirrhosis of liver     Coronary artery disease     Diabetes mellitus     GERD (gastroesophageal reflux disease)     Hyperlipidemia     Hypertension     Renal disorder     Thyroid disease     Ventral hernia 05/08/2017    on CT, referred to Dr Gaytan       Past Surgical History:   Procedure Laterality Date    APPENDECTOMY      CARDIAC SURGERY      CAROTID ENDARTERECTOMY      Wolf    CHOLECYSTECTOMY      CORONARY ARTERY BYPASS GRAFT      2005    HYSTERECTOMY      SHOULDER SURGERY Right 2017    TONSILLECTOMY      ULNAR NERVE TRANSPOSITION Right 03/2016       Review of patient's allergies indicates:   Allergen Reactions    Farxiga [dapagliflozin] Anaphylaxis     Couldn't void    Chantix [varenicline]     Clindamycin     Itraconazole        No current facility-administered medications on file prior to encounter.      Current Outpatient Prescriptions on File Prior to Encounter   Medication Sig    alendronate (FOSAMAX) 70 MG tablet TAKE 1 TABLET BY MOUTH EVERY 7 DAYS (Patient taking differently: TAKE 1 TABLET BY MOUTH EVERY 7 DAYS on Sundays)    alprazolam (XANAX) 0.5 MG tablet Take 1 tablet (0.5 mg total) by mouth 2 (two) times daily. as needed for anxiety.    aspirin 81 MG Chew Take 81 mg by mouth once daily.    calcium-vitamin D3 500 mg(1,250mg) -200 unit per tablet Take 1 tablet by mouth every evening.     clopidogrel (PLAVIX) 75 mg tablet Take 1 tablet (75 mg total) by mouth once daily.    CONTOUR NEXT STRIPS Strp 1 strip 3 (three) times daily.     ferrous sulfate 325 mg (65 mg iron) Tab tablet Take 1 tablet (325 mg total) by mouth 2 (two) times daily.    fish oil-omega-3 fatty acids 300-1,000 mg capsule Take 2 g by mouth 2 (two) times daily.    furosemide (LASIX) 40 MG tablet TAKE 1 TABLET BY MOUTH ONCE DAILY    gabapentin (NEURONTIN) 300 MG  capsule Take 1 capsule (300 mg total) by mouth every evening. For nerve pain in abdomen    glimepiride (AMARYL) 4 MG tablet Take 1 tablet (4 mg total) by mouth before breakfast. For diabetes    hydrocodone-acetaminophen 5-325mg (NORCO) 5-325 mg per tablet Take 1 tablet by mouth every 6 (six) hours as needed for Pain.    levothyroxine (SYNTHROID) 88 MCG tablet Take 1 tablet (88 mcg total) by mouth once daily.    lisinopril (PRINIVIL,ZESTRIL) 40 MG tablet TAKE 1/2 TABLET(20MG) BY MOUTH DAILY    magnesium oxide (MAG-OX) 400 mg tablet Take 1 tablet (400 mg total) by mouth 2 (two) times daily.    metformin (GLUCOPHAGE) 500 MG tablet TAKE 2 TABLETS BY MOUTH TWICE DAILY    metoprolol tartrate (LOPRESSOR) 25 MG tablet TAKE 1 TABLET BY MOUTH TWICE DAILY    metronidazole (FLAGYL) 500 MG tablet Take 1 tablet (500 mg total) by mouth every 8 (eight) hours.    MICROLET LANCET Misc 3 (three) times daily.     olanzapine (ZYPREXA) 10 MG tablet TAKE 1/2 TABLET BY MOUTH EVERY EVENING    simvastatin (ZOCOR) 40 MG tablet TAKE 1 TABLET BY MOUTH EVERY DAY    SITagliptan (JANUVIA) 100 MG Tab Take 1 tablet (100 mg total) by mouth once daily.    venlafaxine (EFFEXOR-XR) 75 MG 24 hr capsule TAKE ONE CAPSULE BY MOUTH ONCE DAILY    cyanocobalamin, vitamin B-12, (VITAMIN B-12) 1,000 mcg Subl Place 1,000 mcg under the tongue once daily.    nitroGLYCERIN (NITROSTAT) 0.4 MG SL tablet Place 0.4 mg under the tongue every 5 (five) minutes as needed for Chest pain.    [DISCONTINUED] dicyclomine (BENTYL) 20 mg tablet Take 1 tablet (20 mg total) by mouth 2 (two) times daily.    [DISCONTINUED] docusate sodium (COLACE) 100 MG capsule Take 1 capsule (100 mg total) by mouth 2 (two) times daily.    [DISCONTINUED] olanzapine (ZYPREXA) 10 MG tablet Take 5 mg by mouth once daily.     Family History     Problem Relation (Age of Onset)    Cancer Brother    Diabetes Father    Heart disease Mother    Pulmonary embolism Mother        Social History  Main Topics    Smoking status: Current Every Day Smoker     Packs/day: 0.50     Years: 0.50    Smokeless tobacco: Never Used    Alcohol use No    Drug use: No    Sexual activity: Not on file     Review of Systems   Constitutional: Negative for chills, fatigue and fever.   HENT: Negative for congestion, postnasal drip, sneezing and sore throat.    Eyes: Negative for discharge, redness and itching.   Respiratory: Negative for cough, shortness of breath and wheezing.    Cardiovascular: Negative for chest pain, palpitations and leg swelling.   Gastrointestinal: Positive for abdominal pain, blood in stool, diarrhea, nausea and vomiting. Negative for abdominal distention and constipation.   Endocrine: Negative for polydipsia, polyphagia and polyuria.   Genitourinary: Positive for decreased urine volume, difficulty urinating and dysuria. Negative for flank pain, frequency, hematuria and urgency.   Musculoskeletal: Negative for arthralgias, joint swelling and myalgias.   Skin: Negative for rash and wound.   Neurological: Negative for dizziness, syncope, weakness, light-headedness and headaches.   Psychiatric/Behavioral: Negative for agitation, confusion and sleep disturbance. The patient is nervous/anxious.      Objective:     Vital Signs (Most Recent):  Temp: 98.1 °F (36.7 °C) (07/25/17 1117)  Pulse: 89 (07/25/17 1749)  Resp: 17 (07/25/17 1749)  BP: (!) 114/45 (07/25/17 1749)  SpO2: 97 % (07/25/17 1749) Vital Signs (24h Range):  Temp:  [98.1 °F (36.7 °C)] 98.1 °F (36.7 °C)  Pulse:  [84-92] 89  Resp:  [12-20] 17  SpO2:  [96 %-100 %] 97 %  BP: ()/(45-64) 114/45     Weight: 63.5 kg (140 lb)  Body mass index is 27.34 kg/m².    Physical Exam   Constitutional: She is oriented to person, place, and time. She appears well-developed. No distress.   Obese   HENT:   Head: Normocephalic and atraumatic.   Mouth/Throat: Oropharynx is clear and moist. No oropharyngeal exudate.   Eyes: Conjunctivae and EOM are normal. Pupils  are equal, round, and reactive to light. No scleral icterus.   Neck: Normal range of motion. Neck supple. No JVD present. No thyromegaly present.   Cardiovascular: Normal rate and regular rhythm.    No murmur heard.  Pulmonary/Chest: Effort normal and breath sounds normal. No respiratory distress. She has no wheezes. She has no rales. She exhibits no tenderness.   Abdominal: Soft. She exhibits no distension. There is no tenderness. There is no rebound and no guarding.   Hypoactive bowel sounds    Musculoskeletal: Normal range of motion. She exhibits no edema.   Neurological: She is alert and oriented to person, place, and time.   Skin: Skin is warm and dry. Capillary refill takes less than 2 seconds. No rash noted. She is not diaphoretic.   Scattered ecchymoses on limbs   Psychiatric: She has a normal mood and affect. Thought content normal.        Significant Labs:   CBC:   Recent Labs  Lab 07/25/17  1210 07/25/17  1722   WBC 11.11 10.63   HGB 7.0* 6.2*   HCT 20.5* 18.0*    179     CMP:   Recent Labs  Lab 07/25/17  1210   *   K 4.8      CO2 19*   *   BUN 15   CREATININE 1.2   CALCIUM 10.6*   PROT 6.6   ALBUMIN 3.6   BILITOT 0.4   ALKPHOS 81   AST 19   ALT 14   ANIONGAP 10   EGFRNONAA 48*     Lactic Acid:   Recent Labs  Lab 07/25/17  1210 07/25/17  1712   LACTATE 3.4* 3.5*     Urine Studies:   Recent Labs  Lab 07/25/17  1210   COLORU Yellow   APPEARANCEUA Clear   PHUR 6.0   SPECGRAV 1.020   PROTEINUA Trace*   GLUCUA Negative   KETONESU Negative   BILIRUBINUA Negative   OCCULTUA 2+*   NITRITE Negative   UROBILINOGEN Negative   LEUKOCYTESUR Trace*   RBCUA 8*   WBCUA 1   BACTERIA None   SQUAMEPITHEL Moderate   HYALINECASTS 3*     Stool NEGATIVE for C. Diff, POSITIVE for blood     Significant Imaging:     Imaging Results          X-Ray Abdomen Flat And Erect (Final result)  Result time 07/25/17 14:16:51    Final result by Soy Overton MD (07/25/17 14:16:51)                  Impression:     No active process.      Electronically signed by: VANESSA ACKERMAN MD  Date:     07/25/17  Time:    14:16              Narrative:    2 views of abdomen, postop sternotomy, scattered air-fluid levels, nondistended large and small bowel.                                Assessment/Plan:     * Acute upper GI bleed    Pt reports dark stool.  Noted drop in H&H from baseline 11&30 to 7&21. Fecal occult blood positive. Pt given IV PPI in ED. Continue BID PPI. Consult GI (Dr. Wilkerson). Transfuse 2 units of PRBC. Monitor.             C. difficile colitis    Diagnosed on 7/22 and started on Flagyl.  Test on stool today are NEGATIVE for C. Diff Toxins and Antigen. Continue Flagyl to complete 14 day course for C. Difficile. Pt reports more semi-formed stool.           Cirrhosis of liver without ascites    Pt with family h/o same.  Followed by GI. No elevation in T. Bili, AST, or ALT.  No ascites on exam. Noted portal hypertension on last CT abdomen scan. Holding home furosemide 40 mg for slight elevation in creatinine.  Pt on metoprolol 25 mg BID which will be resumed here with parameters.           Coronary artery disease involving nonautologous biological coronary bypass graft without angina pectoris    S/P CABG 2005, History of Stroke  Denies CP. Hold ASA 81 mg and Plavix for acute GI bleeding. Resume statin. Monitor on telemetry.              Essential hypertension    BP fluctuating.  On lisinopril 20 mg daily which will be held for slight WERO and metoprolol tartrate 25 mg BID which will be resumed with parameters. Monitor. PRN hydralazine.           Gastroesophageal reflux disease without esophagitis    No home meds.  On PPI BID for GI bleeding.           Type 2 diabetes mellitus without complication, without long-term current use of insulin    Check blood glucose AC and HS and use SSI.  Check A1c.  Diabetic diet. Last A1c was 6.4% on 5/4/17. On glimepiride 4 mg daily, metformin 1000 mg BID and sitagliptin  100 mg daily at home. Holding all PO meds. Daughters report symptoms worsened when pt was started on sitagliptin.              Anxiety    Depression  Pt reports feeling anxious. Resume home alprazolam 0.5 mg BID prn, olanzapine 5 mg nightly and venlafaxine 75 mg daily.              VTE Risk Mitigation     Moderate risk - TEDs, SCDs with GI bleeding requiring transfusion         Rylie Justice PA-C  Department of Hospital Medicine   [facility]  Pager: [phone]    Attending: Luis Akins MD

## 2017-07-25 NOTE — ED TRIAGE NOTES
Patient to ED with family with complaints of abdominal pain, diarrhea, nausea, vomiting, dizziness, and weakness. Pt states that she was discharged from the hospital on Sunday and diagnosed with c diff. Pt states that she has been taking flagyl for the c diff. Pt rates her pain a 6/10. Pt denies any CP or SOB.

## 2017-07-25 NOTE — ASSESSMENT & PLAN NOTE
Depression  Pt reports feeling anxious. Resume home alprazolam 0.5 mg BID prn, olanzapine 5 mg nightly and venlafaxine 75 mg daily.

## 2017-07-25 NOTE — ASSESSMENT & PLAN NOTE
Pt with family h/o same.  Followed by GI. No elevation in T. Bili, AST, or ALT.  No ascites on exam. Noted portal hypertension on last CT abdomen scan. Holding home furosemide 40 mg for slight elevation in creatinine.  Pt on metoprolol 25 mg BID which will be resumed here with parameters.

## 2017-07-25 NOTE — ED NOTES
Pt made aware of POC to be admitted to the hospital for antibiotics; pt and family verbalize understanding.

## 2017-07-25 NOTE — ASSESSMENT & PLAN NOTE
Check blood glucose AC and HS and use SSI.  Check A1c.  Diabetic diet. Last A1c was 6.4% on 5/4/17. On glimepiride 4 mg daily, metformin 1000 mg BID and sitagliptin 100 mg daily at home. Holding all PO meds. Daughters report symptoms worsened when pt was started on sitagliptin.

## 2017-07-26 PROBLEM — E87.6 HYPOKALEMIA: Status: ACTIVE | Noted: 2017-01-01

## 2017-07-26 PROBLEM — E83.42 HYPOMAGNESEMIA: Status: ACTIVE | Noted: 2017-01-01

## 2017-07-26 PROBLEM — D69.6 THROMBOCYTOPENIA, UNSPECIFIED: Status: ACTIVE | Noted: 2017-01-01

## 2017-07-26 NOTE — ASSESSMENT & PLAN NOTE
S/P CABG 2005, History of Stroke  Denies CP. Hold ASA 81 mg and Plavix for acute GI bleeding. Continue statin. Monitor on telemetry.

## 2017-07-26 NOTE — ASSESSMENT & PLAN NOTE
BP fluctuating with SBP .  On lisinopril 20 mg daily which was held for slight WERO and metoprolol tartrate 25 mg BID which will be resumed with parameters. Monitor. PRN hydralazine.

## 2017-07-26 NOTE — PLAN OF CARE
Future Appointments  Date Time Provider Department Center   8/1/2017 4:00 PM Jam Wilkerson Jr., MD [facility] [place]   8/22/2017 9:30 AM Joseph Kirkpatrick MD [facility]     Patient had PCP appt yesterday but came to hospital instead.       07/26/17 0954   Discharge Assessment   Assessment Type Discharge Planning Assessment   Confirmed/corrected address and phone number on facesheet? Yes   Assessment information obtained from? Patient   Prior to hospitilization cognitive status: Alert/Oriented   Prior to hospitalization functional status: Independent   Current cognitive status: Alert/Oriented   Current Functional Status: Independent   Arrived From home or self-care   Lives With child(sharmaine), adult   Able to Return to Prior Arrangements yes   Is patient able to care for self after discharge? Yes   How many people do you have in your home that can help with your care after discharge? 2   Who are your caregiver(s) and their phone number(s)? laurie Matamoros [phone]   Patient's perception of discharge disposition home or selfcare   Readmission Within The Last 30 Days other (see comments)  (pain in stomach and diarrhea)   Patient currently being followed by outpatient case management? No   Patient currently receives home health services? No   Does the patient currently use HME? No   Patient currently receives private duty nursing? No   Patient currently receives any other outside agency services? No   Equipment Currently Used at Home none   Do you have any problems affording any of your prescribed medications? No   Is the patient taking medications as prescribed? yes   Do you have any financial concerns preventing you from receiving the healthcare you need? No   Does the patient have transportation to healthcare appointments? Yes   Transportation Available family or friend will provide   On Dialysis? No   Does the patient receive services at the [facility]? No   Discharge Plan A Home;Home with family    Discharge Plan B Home;Home with family   Patient/Family In Agreement With Plan yes     Laura Lei, RN, CCM, CMSRN  RN Transition Navigator  [phone]

## 2017-07-26 NOTE — PROGRESS NOTES
[facility] Medicine  Progress Note    Patient Name: Neha Conrad  MRN: [id number]  Patient Class: IP- Inpatient   Admission Date: 7/25/2017  Length of Stay: 1 days  Attending Physician: Luis Akins MD  Primary Care Provider: Saeed Armando MD        Subjective:     Principal Problem:Acute upper GI bleed    HPI:  66 year female with history of Diabetes Type 2, Coronary Artery Disease (S/P CABG (2005), Hypertension, Hyperlipidemia, Carotid Disease (S/P Carotid Endarterectomy), GERD, Cirrhosis (diagnosed at [facility] in 2015), and Ventral Hernia Repair with mesh.  She lives at home with her daughters Cathy and Jing. She has chronic, intermittent abdominal pan. She  is under the care of Dr. Wilkerson GI, for abdominal complaints. She was seen in the ED on 7/9/17 for worsening abdominal pain and started on bentyl and she was also treated with a 7 day course of ciprofloxacin for a urinary tract infection.  She was last admitted here 7/21 - 7/23/2017 for nausea, vomiting and diarrhea with positive C. Diff Antigen and Toxins (7/22/2017). She was started on Flagyl and was feeling well on the day of discharge.  This morning, she awoke feeling well then started with abdominal cramping, vomiting, and dark semi-solid stool. Her daughters encouraged her to present to ED.       ED workup significant for reduction in hemoglobin to 7 and hematocrit to 21.  Lactic acid elevated at 3.5.  Blood cultures collected and pt given IVF and oral vancomycin for infectious diarrhea.   C. Diff Angtigen and Toxins are NEGATIVE today.  She has occult blood positive stool. Admitting to [facility] with GI consult.  ER MD consented patient for transfusion of 2 units of PRBC. Pt denies h/o blood transfusion.        Hospital Course:  H&H improved to 8.9&26 after transfusion of 2 units of PRBC on 7/25/2017.  Pt reports 2 soft, dark BMs since yesterday.     Interval History: Pt states feeling much  better. Reports 2 soft, dark BMs since yesterday. Denies abdominal pain, N/V.     Review of Systems   Constitutional: Negative for chills and fever.   Respiratory: Negative for cough and shortness of breath.    Cardiovascular: Negative for chest pain.   Gastrointestinal: Positive for blood in stool. Negative for abdominal pain, nausea and vomiting.     Objective:     Vital Signs (Most Recent):  Temp: 98.1 °F (36.7 °C) (07/26/17 0800)  Pulse: 94 (07/26/17 0800)  Resp: 18 (07/26/17 0800)  BP: (!) 103/50 (07/26/17 0800)  SpO2: 95 % (07/26/17 1005) Vital Signs (24h Range):  Temp:  [98.1 °F (36.7 °C)-99.5 °F (37.5 °C)] 98.1 °F (36.7 °C)  Pulse:  [84-98] 94  Resp:  [12-20] 18  SpO2:  [93 %-99 %] 95 %  BP: ()/(45-69) 103/50     Weight: 61.5 kg (135 lb 8 oz)  Body mass index is 26.46 kg/m².    Intake/Output Summary (Last 24 hours) at 07/26/17 1151  Last data filed at 07/26/17 0900   Gross per 24 hour   Intake             1375 ml   Output             1545 ml   Net             -170 ml      Physical Exam   Constitutional: She is oriented to person, place, and time. No distress.   Cardiovascular: Normal rate and regular rhythm.    Pulmonary/Chest: Breath sounds normal. No respiratory distress.   Abdominal: Soft. Bowel sounds are normal. She exhibits no distension. There is no tenderness.   Neurological: She is alert and oriented to person, place, and time.   Psychiatric: She has a normal mood and affect. Thought content normal.   Nursing note and vitals reviewed.      Significant Labs:   BMP:   Recent Labs  Lab 07/26/17  0412   GLU 53*   *   K 3.2*      CO2 20*   BUN 9   CREATININE 0.9   CALCIUM 8.5*   MG 1.3*     CBC:   Recent Labs  Lab 07/25/17  1722 07/25/17  1818 07/26/17  0412   WBC 10.63 10.96 9.56   HGB 6.2* 6.3* 8.9*   HCT 18.0* 19.2* 26.2*    188 130*     POCT Glucose:   Recent Labs  Lab 07/25/17  1506 07/26/17  0522 07/26/17  1042   POCTGLUCOSE 100 60* 165*       Significant Imaging: no  new    Assessment/Plan:      * Acute upper GI bleed    2 episodes of soft, dark stool since admission. Noted drop in H&H from baseline 11&30 to 7&21. Improved to 8.9&26 s/p transfusion of 2 units PRBC. Fecal occult blood positive. Pt given IV PPI in ED. Continue BID PPI. Consult GI (Dr. Wilkerson). Monitor.             C. difficile colitis    Diagnosed on 7/22 and started on Flagyl.  Tests on stool 7/25 are NEGATIVE for C. Diff Toxins and Antigen. Continue Flagyl, day 6 of 14 day course for C. Difficile. Pt reports more semi-formed stool.           Cirrhosis of liver without ascites    Thrombocytopenia   MELD 6.  Pt with family h/o same.  Followed by GI. No elevation in T. Bili, AST, or ALT.  No ascites on exam. Noted portal hypertension on last CT abdomen scan. Resume home furosemide at 20 instead of 40 mg.  Pt on metoprolol 25 mg BID which will be resumed here with parameters.           Coronary artery disease involving nonautologous biological coronary bypass graft without angina pectoris    S/P CABG 2005, History of Stroke  Denies CP. Hold ASA 81 mg and Plavix for acute GI bleeding. Continue statin. Monitor on telemetry.              Essential hypertension    BP fluctuating with SBP .  On lisinopril 20 mg daily which was held for slight WERO and metoprolol tartrate 25 mg BID which will be resumed with parameters. Monitor. PRN hydralazine.           Gastroesophageal reflux disease without esophagitis    No home meds.  On PPI BID for GI bleeding.           Type 2 diabetes mellitus without complication, without long-term current use of insulin    FBG 53 this morning.  Check blood glucose AC and HS and use SSI.  Check A1c.  Diabetic diet. Last A1c was 6.4% on 5/4/17. On glimepiride 4 mg daily, metformin 1000 mg BID and sitagliptin 100 mg daily at home. Holding all PO meds. Daughters report symptoms worsened when pt was started on sitagliptin.              Anxiety    Depression  Mood stable. Continue home  alprazolam 0.5 mg BID prn, olanzapine 5 mg nightly and venlafaxine 75 mg daily.            Hypokalemia    Hypomagnesemia  K 3.2 and Mg 1.3 today likely 2/2 GI losses.  Replace PO. Monitor.             VTE Risk Mitigation         Ordered     Medium Risk of VTE  Once      07/25/17 2305     Place sequential compression device  Until discontinued      07/25/17 2305     Place BETH hose  Until discontinued      07/25/17 2305     Reason for no Mechanical VTE Prophylaxis  Once      07/25/17 2305          Rylie Justice PA-C  Department of Hospital Medicine   [facility]  Pager: [phone]    Attending: Luis Akins MD

## 2017-07-26 NOTE — ASSESSMENT & PLAN NOTE
FBG 53 this morning.  Check blood glucose AC and HS and use SSI.  Check A1c.  Diabetic diet. Last A1c was 6.4% on 5/4/17. On glimepiride 4 mg daily, metformin 1000 mg BID and sitagliptin 100 mg daily at home. Holding all PO meds. Daughters report symptoms worsened when pt was started on sitagliptin.

## 2017-07-26 NOTE — ASSESSMENT & PLAN NOTE
Thrombocytopenia   MELD 6.  Pt with family h/o same.  Followed by GI. No elevation in T. Bili, AST, or ALT.  No ascites on exam. Noted portal hypertension on last CT abdomen scan. Resume home furosemide at 20 instead of 40 mg.  Pt on metoprolol 25 mg BID which will be resumed here with parameters.

## 2017-07-26 NOTE — PROGRESS NOTES
Pharmacy New Medication Education     Patient accepted medication education.     Pharmacy educated patient on the following medications, using the teach-back method.   Apap  Xanax  D50%  Feoso4  Gabapentin  Glucagon  Glucose  Hydralazine  Norco  Novolog  Synthroid  Flagyl  Nicotine  Zofran  Pantoprazole  ramelteon      Learners of pharmacy medication education included:  patient     Patient +/- learner response:  verbalize understanding

## 2017-07-26 NOTE — SUBJECTIVE & OBJECTIVE
Interval History: Pt states feeling much better. Reports 2 soft, dark BMs since yesterday. Denies abdominal pain, N/V.     Review of Systems   Constitutional: Negative for chills and fever.   Respiratory: Negative for cough and shortness of breath.    Cardiovascular: Negative for chest pain.   Gastrointestinal: Positive for blood in stool. Negative for abdominal pain, nausea and vomiting.     Objective:     Vital Signs (Most Recent):  Temp: 98.1 °F (36.7 °C) (07/26/17 0800)  Pulse: 94 (07/26/17 0800)  Resp: 18 (07/26/17 0800)  BP: (!) 103/50 (07/26/17 0800)  SpO2: 95 % (07/26/17 1005) Vital Signs (24h Range):  Temp:  [98.1 °F (36.7 °C)-99.5 °F (37.5 °C)] 98.1 °F (36.7 °C)  Pulse:  [84-98] 94  Resp:  [12-20] 18  SpO2:  [93 %-99 %] 95 %  BP: ()/(45-69) 103/50     Weight: 61.5 kg (135 lb 8 oz)  Body mass index is 26.46 kg/m².    Intake/Output Summary (Last 24 hours) at 07/26/17 1151  Last data filed at 07/26/17 0900   Gross per 24 hour   Intake             1375 ml   Output             1545 ml   Net             -170 ml      Physical Exam   Constitutional: She is oriented to person, place, and time. No distress.   Cardiovascular: Normal rate and regular rhythm.    Pulmonary/Chest: Breath sounds normal. No respiratory distress.   Abdominal: Soft. Bowel sounds are normal. She exhibits no distension. There is no tenderness.   Neurological: She is alert and oriented to person, place, and time.   Psychiatric: She has a normal mood and affect. Thought content normal.   Nursing note and vitals reviewed.      Significant Labs:   BMP:   Recent Labs  Lab 07/26/17  0412   GLU 53*   *   K 3.2*      CO2 20*   BUN 9   CREATININE 0.9   CALCIUM 8.5*   MG 1.3*     CBC:   Recent Labs  Lab 07/25/17  1722 07/25/17  1818 07/26/17  0412   WBC 10.63 10.96 9.56   HGB 6.2* 6.3* 8.9*   HCT 18.0* 19.2* 26.2*    188 130*     POCT Glucose:   Recent Labs  Lab 07/25/17  1506 07/26/17  0522 07/26/17  1042   POCTGLUCOSE 100 60*  165*       Significant Imaging: no new

## 2017-07-26 NOTE — ASSESSMENT & PLAN NOTE
Depression  Mood stable. Continue home alprazolam 0.5 mg BID prn, olanzapine 5 mg nightly and venlafaxine 75 mg daily.

## 2017-07-26 NOTE — HOSPITAL COURSE
Hemoglobin and hematocrit improved to 8.9 and 26 after transfusion.  She continued to have soft, dark bowel movements.    Upper GI endoscopy 7/27/17:  The examined esophagus was normal. Localized mild inflammation characterized by congestion (edema) was found in the gastric antrum. Biopsies were taken with a cold forceps for histology. Verification of patient identification for the specimen was done. Estimated blood loss was minimal. The examined duodenum was normal.    Gastroenterologist Dr. Reed Medina performed EGD on 7/27/17, finding no active bleeding and no etiology of upper GI bleed.  Mild localized antral gastritis was seen and biopsies taken.  She was discharged home on pantoprazole 40 mg daily for 14 days.

## 2017-07-26 NOTE — ASSESSMENT & PLAN NOTE
Diagnosed on 7/22 and started on Flagyl.  Tests on stool 7/25 are NEGATIVE for C. Diff Toxins and Antigen. Continue Flagyl, day 6 of 14 day course for C. Difficile. Pt reports more semi-formed stool.

## 2017-07-26 NOTE — PLAN OF CARE
States having semi loose  Tarry stool but did not save any. Instructed to do so with next bm.Continued on falls/safety monitoring.Denies any discomfort

## 2017-07-26 NOTE — PLAN OF CARE
07/26/17 0959   Readmission Questionnaire   At the time of your discharge, did someone talk to you about what your health problems were? Yes   At the time of discharge, did someone talk to you about what to watch out for regarding worsening of your health problem? Yes   At the time of discharge, did someone talk to you about what to do if you experienced worsening of your health problem? Yes   At the time of discharge, did someone talk to you about which medication to take when you left the hospital and which ones to stop taking? Yes   At the time of discharge, did someone talk to you about when and where to follow up with a doctor after you left the hospital? Yes   What do you believe caused you to be sick enough to be re-admitted? diarrhea and stomach pain   How often do you need to have someone help you when you read instructions, pamphlets, or other written material from your doctor or pharmacy? Often   Do you have problems taking your medications as prescribed? No   Do you have any problems affording any of  your prescribed medications? Yes   Do you have problems obtaining/receiving your medications? No   Does the patient have transportation to healthcare appointments? Yes   Lives With child(sharmaine), adult   Living Arrangements mobile home   Does the patient have family/friends to help with healtcare needs after discharge? yes   Who are your caregiver(s) and their phone number(s)? daughter Jing [phone]   Does your caregiver provide all the help you need? Yes   Are you currently feeling confused? Yes   Are you currently having problems thinking? Yes   Are you currently having memory problems? Yes   In the last 7 days, my sleep quality was: poor

## 2017-07-26 NOTE — PLAN OF CARE
On falls/safety monitoring and special contact isolation. Patient/family Education on PPE and isolation done .Bed alarm  on ,call light within reach. Seen by Dr Akins and FREDIS Contreras. Not always compliant with safety instructions, getting OOB without calling setting bed alarm off.  Safety Education reinforced.

## 2017-07-26 NOTE — NURSING
Pt arrived from ER via bed during shift report.  Pt receiving her first unit of blood.  Pt is a readmit.  Pt transferred to her bed and oriented to room.  Reviewed routine for blood transfusion and s/s to report to nurse.  Pt verbalized understanding.

## 2017-07-26 NOTE — ASSESSMENT & PLAN NOTE
2 episodes of soft, dark stool since admission. Noted drop in H&H from baseline 11&30 to 7&21. Improved to 8.9&26 s/p transfusion of 2 units PRBC. Fecal occult blood positive. Pt given IV PPI in ED. Continue BID PPI. Consult GI (Dr. Wilkerson). Monitor.

## 2017-07-27 PROBLEM — E87.6 HYPOKALEMIA: Status: RESOLVED | Noted: 2017-01-01 | Resolved: 2017-01-01

## 2017-07-27 PROBLEM — D69.59 THROMBOCYTOPENIA DUE TO DEFECTIVE PLATELET PRODUCTION: Chronic | Status: ACTIVE | Noted: 2017-01-01

## 2017-07-27 PROBLEM — D62 ACUTE BLOOD LOSS ANEMIA: Status: ACTIVE | Noted: 2017-01-01

## 2017-07-27 PROBLEM — K92.2 ACUTE UPPER GI BLEED: Status: RESOLVED | Noted: 2017-01-01 | Resolved: 2017-01-01

## 2017-07-27 NOTE — SUBJECTIVE & OBJECTIVE
Past Medical History:   Diagnosis Date    Calcification of aorta     Cirrhosis of liver     Coronary artery disease     Diabetes mellitus     GERD (gastroesophageal reflux disease)     Hyperlipidemia     Hypertension     Renal disorder     Thyroid disease     Ventral hernia 05/08/2017    on CT, referred to Dr Gaytan       Past Surgical History:   Procedure Laterality Date    APPENDECTOMY      CARDIAC SURGERY      CAROTID ENDARTERECTOMY      Wolf    CHOLECYSTECTOMY      CORONARY ARTERY BYPASS GRAFT      2005    HYSTERECTOMY      SHOULDER SURGERY Right 2017    TONSILLECTOMY      ULNAR NERVE TRANSPOSITION Right 03/2016       Review of patient's allergies indicates:   Allergen Reactions    Farxiga [dapagliflozin] Anaphylaxis     Couldn't void    Chantix [varenicline]     Clindamycin     Itraconazole      Family History     Problem Relation (Age of Onset)    Cancer Brother    Diabetes Father    Heart disease Mother    Pulmonary embolism Mother        Social History Main Topics    Smoking status: Current Every Day Smoker     Packs/day: 0.50     Years: 0.50    Smokeless tobacco: Never Used    Alcohol use No    Drug use: No    Sexual activity: Not on file     Review of Systems   Constitutional: Positive for appetite change and fatigue. Negative for chills and fever.   HENT: Negative for postnasal drip and trouble swallowing.    Eyes: Negative for pain and redness.   Respiratory: Negative for cough, choking, chest tightness and shortness of breath.    Cardiovascular: Negative for chest pain and leg swelling.   Gastrointestinal: Positive for abdominal distention, blood in stool, diarrhea and nausea. Negative for abdominal pain, anal bleeding and constipation.   Endocrine: Negative for cold intolerance and heat intolerance.   Genitourinary: Negative for difficulty urinating and hematuria.   Musculoskeletal: Positive for arthralgias. Negative for back pain.   Skin: Negative for color change and  pallor.   Allergic/Immunologic: Negative for environmental allergies and food allergies.   Neurological: Negative for dizziness and light-headedness.   Hematological: Negative for adenopathy. Bruises/bleeds easily.   Psychiatric/Behavioral: Negative for agitation and behavioral problems.     Objective:     Vital Signs (Most Recent):  Temp: 97.8 °F (36.6 °C) (07/27/17 0405)  Pulse: 72 (07/27/17 0405)  Resp: 18 (07/27/17 0405)  BP: (!) 112/54 (07/27/17 0405)  SpO2: 96 % (07/27/17 0420) Vital Signs (24h Range):  Temp:  [97.8 °F (36.6 °C)-98.8 °F (37.1 °C)] 97.8 °F (36.6 °C)  Pulse:  [72-97] 72  Resp:  [18-20] 18  SpO2:  [95 %-97 %] 96 %  BP: (112-168)/(54-69) 112/54     Weight: 61.5 kg (135 lb 8 oz) (07/25/17 2124)  Body mass index is 26.46 kg/m².      Intake/Output Summary (Last 24 hours) at 07/27/17 0924  Last data filed at 07/27/17 0405   Gross per 24 hour   Intake              250 ml   Output             1420 ml   Net            -1170 ml       Lines/Drains/Airways     Peripheral Intravenous Line                 Peripheral IV - Single Lumen 07/25/17 1220 Left Antecubital 1 day                Physical Exam   Constitutional: She is oriented to person, place, and time. She appears well-developed and well-nourished. No distress.   HENT:   Head: Normocephalic and atraumatic.   Eyes: Conjunctivae are normal. No scleral icterus.   Neck: Normal range of motion. Neck supple. No tracheal deviation present. No thyromegaly present.   Cardiovascular: Normal rate and regular rhythm.  Exam reveals no gallop and no friction rub.    No murmur heard.  Pulmonary/Chest: Effort normal and breath sounds normal. No respiratory distress. She has no wheezes.   Abdominal: Soft. Bowel sounds are normal. She exhibits distension. There is no tenderness.   Musculoskeletal:        Right wrist: She exhibits normal range of motion and no tenderness.        Left wrist: She exhibits normal range of motion and no tenderness.   Lymphadenopathy:         Head (right side): No submental and no submandibular adenopathy present.        Head (left side): No submental and no submandibular adenopathy present.   Neurological: She is alert and oriented to person, place, and time.   No asterixis   Skin: Skin is warm and dry. No rash noted. She is not diaphoretic. No erythema.   Psychiatric: She has a normal mood and affect. Her behavior is normal.   Nursing note and vitals reviewed.      Significant Labs:  CBC:   Recent Labs  Lab 07/25/17  1818 07/26/17  0412 07/27/17  0845   WBC 10.96 9.56 5.69   HGB 6.3* 8.9* 8.7*   HCT 19.2* 26.2* 25.9*    130* 131*     CMP:   Recent Labs  Lab 07/25/17  1210 07/26/17  0412   * 53*   CALCIUM 10.6* 8.5*   ALBUMIN 3.6  --    PROT 6.6  --    * 134*   K 4.8 3.2*   CO2 19* 20*    107   BUN 15 9   CREATININE 1.2 0.9   ALKPHOS 81  --    ALT 14  --    AST 19  --    BILITOT 0.4  --      Coagulation: INR 1 on 7/22    Significant Imaging:  Imaging results within the past 24 hours have been reviewed..

## 2017-07-27 NOTE — HPI
This is a 67yo female with a history of cirrhosis suspected due to DOSS, here with dark stools.  She noted multiple episodes of black, tarry stool several days prior to admission of a moderate amount. No hematochezia or abdominal pain.  Over the last several days it has become lighter in color.  She is unclear of her last upper endoscopy but it appears that in 2015 without evidence of varices mentioned.  She denies NSAID usage, unintentional weight loss dark urine or hematemesis.  The last few months she has had episodes of bilious emesis.  She denies dysphagia or odynophagia.  No other exacerbating or relieving factors or other associated symptoms.

## 2017-07-27 NOTE — PHYSICIAN QUERY
"PT Name: Neha Conrad  MR #: [id number]    Physician Query Form - Hematology Clarification      CDS/: Herminia Antonio               Contact information:[email or web address]    This form is a permanent document in the medical record.      Query Date: July 27, 2017    By submitting this query, we are merely seeking further clarification of documentation. Please utilize your independent clinical judgment when addressing the question(s) below.    The Medical record contains the following:   Indicators  Supporting Clinical Findings Location in Medical Record   x "Anemia" documented Diagnoses of Hypotension, C. difficile colitis, and Anemia, unspecified  type were also pertinent to this visit.    ED Provider Notes 7/25   x H & H = Noted drop in H&H from baseline 11&30 to 7&21. Improved to 8.9&26  s/p transfusion of 2 units PRBC. PN 7/26    BP =                     HR=     x "GI bleeding" documented Acute upper GI bleed  PN 7/26    Acute bleeding (Non GI site)     x Transfusion(s) transfusion of 2 units PRBC. PN 7/26    Treatment:      Other:        Provider, please specify diagnosis or diagnoses associated with above clinical findings.    [x ] Acute blood loss anemia  [  ] Chronic blood loss anemia  [  ] Other Hematological Diagnosis (please specify): _________________________________    [  ] Clinically Undetermined       Please document in your progress notes daily for the duration of treatment, until resolved, and include in your discharge summary.                                                                                                      "

## 2017-07-27 NOTE — ASSESSMENT & PLAN NOTE
Diagnosed on 7/22 and started on Flagyl.  Tests on stool 7/25 are NEGATIVE for C. Diff Toxins and Antigen. Continue Flagyl, day 7 of 14 day course for C. Difficile. Pt reports more semi-formed stool. Suspect elevation in lactic acid perhaps from pt's chronic use of Metformin for diabetes.  Trending down. Afebrile. No elevation in WBC since admission. Non-toxic appearing.

## 2017-07-27 NOTE — ASSESSMENT & PLAN NOTE
today.  Nonfasting BG .  Check blood glucose AC and HS and use SSI.  A1c 5.3 on 7/25/17.  Diabetic diet. Last A1c was 6.4% on 5/4/17. On glimepiride 4 mg daily, metformin 1000 mg BID (perhaps contributed to elevated lactic acid on admission) and sitagliptin 100 mg daily at home. Holding all PO meds. Daughters report symptoms worsened when pt was started on sitagliptin.

## 2017-07-27 NOTE — PROGRESS NOTES
Pharmacy New Medication Education     Patient accepted medication education.     Pharmacy educated patient on the following medications, using the teach-back method.   Lasix  Mgox  Lopressor  Zocor  effexor      Learners of pharmacy medication education included:  patient     Patient +/- learner response:  verbalize understanding

## 2017-07-27 NOTE — ASSESSMENT & PLAN NOTE
Diagnosed on 7/22 and started on Flagyl.  Tests on stool 7/25 are NEGATIVE for C. Diff Toxins and Antigen. Continue Flagyl, day 7 of 14 day course for C. Difficile. Pt reports more semi-formed stool.

## 2017-07-27 NOTE — PLAN OF CARE
Problem: Patient Care Overview  Goal: Plan of Care Review  Outcome: Ongoing (interventions implemented as appropriate)  Pt understands her plan of care.  She is monitoring her stools and says they are becoming lighter in color.

## 2017-07-27 NOTE — ANESTHESIA PREPROCEDURE EVALUATION
07/27/2017  Neha Conrad is a 66 y.o., female admitted for upper GI bleed. Scheduled for EGD. On contact precautions for C. difficile.     Past Medical History:   Diagnosis Date    Calcification of aorta     Cirrhosis of liver     Coronary artery disease     Diabetes mellitus     GERD (gastroesophageal reflux disease)     Hyperlipidemia     Hypertension     Renal disorder     Thyroid disease     Ventral hernia 05/08/2017    on CT, referred to Dr Gaytan     Past Surgical History:   Procedure Laterality Date    APPENDECTOMY      CARDIAC SURGERY      CAROTID ENDARTERECTOMY      Wolf    CHOLECYSTECTOMY      CORONARY ARTERY BYPASS GRAFT      2005    HYSTERECTOMY      SHOULDER SURGERY Right 2017    TONSILLECTOMY      ULNAR NERVE TRANSPOSITION Right 03/2016     Review of patient's allergies indicates:   Allergen Reactions    Farxiga [dapagliflozin] Anaphylaxis     Couldn't void    Chantix [varenicline]     Clindamycin     Itraconazole        Anesthesia Evaluation      I have reviewed the Medications.     Review of Systems  Anesthesia Hx:  Denies Hx of Anesthetic complications History of prior surgery of interest to airway management or planning: heart surgery. Previous anesthesia: General  Denies Personal Hx of Anesthesia complications.   Social:  No Alcohol Use, Smoker    Hematology/Oncology:         -- Anemia:   Cardiovascular:   Exercise tolerance: good Hypertension Past MI CAD   hyperlipidemia    Pulmonary:  Pulmonary Normal    Renal/:   Chronic Renal Disease    Hepatic/GI:   GERD Liver Disease, (Cirrhosis) -Ventral Hernia  -C. Difficile with diarrhea  -intermittent nausea  -last emesis 5 days ago   Neurological:   CVA (1999) short-term memory impairment    Endocrine:   Diabetes Hypothyroidism    Psych:   Psychiatric History anxiety depression      Temp:  [36.5 °C (97.7 °F)-37.1 °C (98.8  °F)] 36.5 °C (97.7 °F)  Pulse:  [72-97] 83  Resp:  [18-20] 18  SpO2:  [96 %-97 %] 96 %  BP: (112-168)/(54-69) 152/63      Physical Exam  General:  Well nourished    Airway/Jaw/Neck:  Airway Findings: Mouth Opening: Normal Tongue: Normal  General Airway Assessment: Adult  Mallampati: III  Improves to II with phonation.  TM Distance: Normal, at least 6 cm  Jaw/Neck Findings:  Neck ROM: Normal ROM     Eyes/Ears/Nose:  EYES/EARS/NOSE FINDINGS: Normal   Dental:  Dental Findings: Edentulous        Mental Status:  Mental Status Findings:  Cooperative, Alert and Oriented       Lab Results   Component Value Date    WBC 5.69 07/27/2017    HGB 8.7 (L) 07/27/2017    HCT 25.9 (L) 07/27/2017     (L) 07/27/2017    CHOL 153 05/04/2017    TRIG 337 (H) 05/04/2017    HDL 37 (L) 05/04/2017    ALT 14 07/25/2017    AST 19 07/25/2017     07/27/2017    K 4.2 07/27/2017     07/27/2017    CREATININE 1.0 07/27/2017    BUN 10 07/27/2017    CO2 23 07/27/2017    TSH 0.788 12/30/2016    INR 1.0 07/22/2017    HGBA1C 5.3 07/25/2017     EKG 7/21/17:  Normal sinus rhythm  ST and T wave abnormality, consider lateral ischemia  Abnormal ECG  When compared with ECG of 21-NOV-2014 15:15,  Nonspecific T wave abnormality now evident in Inferior leads  T wave inversion more evident in Anterior-lateral leads  QT has shortened  Confirmed by Kyaw SANTOS, [facility] ([id number]) on 7/24/2017 4:59:24 PM    Stress test 3/20/17:  EKG Conclusions:    1. The EKG portion of this study is negative for ischemia at a peak heart rate of 102 bpm (69% of predicted).   2. Blood pressure remained stable throughout the protocol  (Presenting BP: 69/46 Peak BP: 103/54).   3. No significant arrhythmias were present.   4. There were no symptoms of chest discomfort or significant dyspnea throughout the protocol.   5. Nuclear portion of this study will be reported seperately.        Anesthesia Plan  Type of Anesthesia, risks & benefits discussed:  Anesthesia Type:  general,  MAC  Patient's Preference:   Intra-op Monitoring Plan: standard ASA monitors  Intra-op Monitoring Plan Comments:   Post Op Pain Control Plan:   Post Op Pain Control Plan Comments:   Induction:   IV  Beta Blocker:  Patient is on a Beta-Blocker and has received one dose within the past 24 hours (No further documentation required).       Informed Consent: Patient understands risks and agrees with Anesthesia plan.  Questions answered. Anesthesia consent signed with patient.  ASA Score: 3     Day of Surgery Review of History & Physical:        Anesthesia Plan Notes: Last meal at 8 am. Per ASA guidelines, patient should wait 6-8 hours before procedure.         Ready For Surgery From Anesthesia Perspective.

## 2017-07-27 NOTE — PLAN OF CARE
Problem: Patient Care Overview  Goal: Plan of Care Review   Sats 97% RA , will continue to monitor.

## 2017-07-27 NOTE — ASSESSMENT & PLAN NOTE
- h/o melena, improving  - plan EGD   - risk factors, anemia, CAD h/o CABG, thrombocytopenia  - cont PPI  - monitor hct  - transfuse as needed

## 2017-07-27 NOTE — PROGRESS NOTES
[facility] Hospital Medicine  Progress Note    Patient Name: Neha Conrad  MRN: [id number]  Patient Class: IP- Inpatient   Admission Date: 7/25/2017  Length of Stay: 2 days  Attending Physician: Luis Akins MD  Primary Care Provider: Saeed Armando MD        Subjective:     Principal Problem:Acute upper GI bleed    HPI:  66 year female with history of Diabetes Type 2, Coronary Artery Disease (S/P CABG (2005), Hypertension, Hyperlipidemia, Carotid Disease (S/P Carotid Endarterectomy), GERD, Cirrhosis (diagnosed at [facility] in 2015), and Ventral Hernia Repair with mesh.  She lives at home with her daughters Cathy and Jing. She has chronic, intermittent abdominal pan. She  is under the care of Dr. Wilkerson GI, for abdominal complaints. She was seen in the ED on 7/9/17 for worsening abdominal pain and started on bentyl and she was also treated with a 7 day course of ciprofloxacin for a urinary tract infection.  She was last admitted here 7/21 - 7/23/2017 for nausea, vomiting and diarrhea with positive C. Diff Antigen and Toxins (7/22/2017). She was started on Flagyl and was feeling well on the day of discharge.  This morning, she awoke feeling well then started with abdominal cramping, vomiting, and dark semi-solid stool. Her daughters encouraged her to present to ED.       ED workup significant for reduction in hemoglobin to 7 and hematocrit to 21.  Lactic acid elevated at 3.5.  Blood cultures collected and pt given IVF and oral vancomycin for infectious diarrhea.   C. Diff Angtigen and Toxins are NEGATIVE today.  She has occult blood positive stool. Admitting to [facility] with GI consult.  ER MD consented patient for transfusion of 2 units of PRBC. Pt denies h/o blood transfusion.        Hospital Course:  H&H improved to 8.9&26 after transfusion of 2 units of PRBC on 7/25/2017.  Pt continues to report soft, dark BMs. Seen by GI who plans for EGD today.     Interval  History: Pt without complaints today. Denies CP, SOB, N/V. States still having soft, dark BMs.     Review of Systems   Constitutional: Negative for chills and fever.   Respiratory: Negative for cough and shortness of breath.    Cardiovascular: Negative for chest pain.   Gastrointestinal: Positive for blood in stool. Negative for abdominal pain, nausea and vomiting.   Neurological: Negative for dizziness and light-headedness.     Objective:     Vital Signs (Most Recent):  Temp: 97.7 °F (36.5 °C) (07/27/17 0800)  Pulse: 83 (07/27/17 0800)  Resp: 18 (07/27/17 0800)  BP: (!) 152/63 (07/27/17 0800)  SpO2: 96 % (07/27/17 1009) Vital Signs (24h Range):  Temp:  [97.7 °F (36.5 °C)-98.8 °F (37.1 °C)] 97.7 °F (36.5 °C)  Pulse:  [72-97] 83  Resp:  [18-20] 18  SpO2:  [96 %-97 %] 96 %  BP: (112-168)/(54-69) 152/63     Weight: 61.5 kg (135 lb 8 oz)  Body mass index is 26.46 kg/m².    Intake/Output Summary (Last 24 hours) at 07/27/17 1022  Last data filed at 07/27/17 0900   Gross per 24 hour   Intake              500 ml   Output             1570 ml   Net            -1070 ml      Physical Exam   Constitutional: She is oriented to person, place, and time. No distress.   Cardiovascular: Normal rate and regular rhythm.    Pulmonary/Chest: Breath sounds normal. No respiratory distress.   Abdominal: Soft. Bowel sounds are normal. She exhibits no distension. There is no tenderness.   Neurological: She is alert and oriented to person, place, and time.   Psychiatric: She has a normal mood and affect. Her behavior is normal. Judgment and thought content normal.   Nursing note and vitals reviewed.      Significant Labs:   BMP:   Recent Labs  Lab 07/27/17  0845   *      K 4.2      CO2 23   BUN 10   CREATININE 1.0   CALCIUM 8.4*   MG 1.3*     CBC:   Recent Labs  Lab 07/25/17  1818 07/26/17  0412 07/27/17  0845   WBC 10.96 9.56 5.69   HGB 6.3* 8.9* 8.7*   HCT 19.2* 26.2* 25.9*    130* 131*     Magnesium:   Recent  Labs  Lab 07/26/17  0412 07/27/17  0845   MG 1.3* 1.3*     POCT Glucose:   Recent Labs  Lab 07/26/17  1529 07/26/17 2008 07/27/17  0614   POCTGLUCOSE 97 195* 135*       Significant Imaging: no new    Assessment/Plan:      * Acute upper GI bleed    6 episodes of soft, dark stool since admission. Noted drop in H&H from baseline 11&30 to 7&21. Improved to 8.9&26 s/p transfusion of 2 units PRBC on 7/25. 8.7&26 today. Fecal occult blood positive. Pt given IV PPI in ED. Continue BID PPI. GI following and plans for EGD today. Monitor.             C. difficile colitis    Diagnosed on 7/22 and started on Flagyl.  Tests on stool 7/25 are NEGATIVE for C. Diff Toxins and Antigen. Continue Flagyl, day 7 of 14 day course for C. Difficile. Pt reports more semi-formed stool.           Cirrhosis of liver without ascites    Thrombocytopenia   MELD 6.  Pt with family h/o same.  No elevation in T. Bili, AST, or ALT.  No ascites on exam. Noted portal hypertension on last CT abdomen scan. Continue home furosemide at 20 instead of 40 mg.  Pt on metoprolol 25 mg BID which is continued here with parameters. Platelets 131 today. No chemoprophylaxis.           Coronary artery disease involving nonautologous biological coronary bypass graft without angina pectoris    S/P CABG 2005, History of Stroke  Denies CP. Hold ASA 81 mg and Plavix for acute GI bleeding. Continue statin. Monitor on telemetry.              Essential hypertension    -168.  On lisinopril 20 mg daily which was held for slight WERO and metoprolol tartrate 25 mg BID which is ordered with parameters. Monitor. PRN hydralazine.           Gastroesophageal reflux disease without esophagitis    No home meds.  On PPI BID for GI bleeding.           Type 2 diabetes mellitus without complication, without long-term current use of insulin     today.  Nonfasting BG .  Check blood glucose AC and HS and use SSI.  A1c 5.3 on 7/25/17.  Diabetic diet. Last A1c was 6.4% on  5/4/17. On glimepiride 4 mg daily, metformin 1000 mg BID and sitagliptin 100 mg daily at home. Holding all PO meds. Daughters report symptoms worsened when pt was started on sitagliptin.              Anxiety    Depression  Mood stable. Continue home alprazolam 0.5 mg BID prn, olanzapine 5 mg nightly and venlafaxine 75 mg daily.            Hypomagnesemia    Mg 1.3 today likely 2/2 GI losses.  Replace IV & PO. Monitor.             VTE Risk Mitigation         Ordered     Medium Risk of VTE  Once      07/25/17 2305     Place sequential compression device  Until discontinued      07/25/17 2305     Place BETH hose  Until discontinued      07/25/17 2305     Reason for no Mechanical VTE Prophylaxis  Once      07/25/17 2305          Rylie Justice PA-C  Department of Hospital Medicine   [facility]  Pager: [phone]    Attending: Luis Akins MD

## 2017-07-27 NOTE — ANESTHESIA POSTPROCEDURE EVALUATION
Anesthesia Post Evaluation    Patient: Neha Conrad    Procedure(s) Performed: Procedure(s) (LRB):  ESOPHAGOGASTRODUODENOSCOPY (EGD) (N/A)    Final Anesthesia Type: MAC  Patient location during evaluation: PACU  Patient participation: Yes- Able to Participate  Level of consciousness: awake  Post-procedure vital signs: reviewed and stable  Pain management: adequate  Airway patency: patent  PONV status at discharge: No PONV  Anesthetic complications: no      Cardiovascular status: stable  Respiratory status: room air, unassisted and spontaneous ventilation  Hydration status: euvolemic  Follow-up not needed.        Visit Vitals  BP (!) 157/69   Pulse 84   Temp 36.6 °C (97.8 °F) (Skin)   Resp 19   Ht 5' (1.524 m)   Wt 61.5 kg (135 lb 8 oz)   SpO2 (!) 93%   Breastfeeding? No   BMI 26.46 kg/m²       Pain/Raman Score: Pain Assessment Performed: Yes (7/27/2017  5:40 PM)  Presence of Pain: denies (7/27/2017  5:40 PM)  Pain Rating Prior to Med Admin: 6 (7/27/2017  2:58 AM)  Pain Rating Post Med Admin: 0 (7/27/2017  3:53 AM)  Raman Score: 10 (7/27/2017  5:40 PM)

## 2017-07-27 NOTE — PLAN OF CARE
Problem: Patient Care Overview  Goal: Plan of Care Review  Outcome: Ongoing (interventions implemented as appropriate)  Pt has been in room resting with no S/S of pain or distress. She sat up in the chair today and has been ambulating to the bathroom. She received 2 IV piggybacks of MG. She is currently getting an upper GI scope done. Pt is on c-diff precautions. She had one BM today that looked like coffee grinds. Pt is currently stable and will continue to monitor when she comes back from procedure. Will give report to oncoming nurse.

## 2017-07-27 NOTE — ANESTHESIA RELEASE NOTE
Anesthesia Release from PACU Note    Patient: Neha Conrad    Procedure(s) Performed: Procedure(s) (LRB):  ESOPHAGOGASTRODUODENOSCOPY (EGD) (N/A)    Anesthesia type: MAC    Post pain: Adequate analgesia    Post assessment: no apparent anesthetic complications    Last Vitals:   Visit Vitals  BP (!) 157/69   Pulse 84   Temp 36.6 °C (97.8 °F) (Skin)   Resp 19   Ht 5' (1.524 m)   Wt 61.5 kg (135 lb 8 oz)   SpO2 (!) 93%   Breastfeeding? No   BMI 26.46 kg/m²       Post vital signs: stable    Level of consciousness: awake    Nausea/Vomiting: no nausea/no vomiting    Complications: none    Airway Patency: patent    Respiratory: unassisted    Cardiovascular: stable    Hydration: euvolemic

## 2017-07-27 NOTE — ASSESSMENT & PLAN NOTE
6 episodes of soft, dark stool since admission. Noted drop in H&H from baseline 11&30 to 7&21. Improved to 8.9&26 s/p transfusion of 2 units PRBC on 7/25. 8.7&26 today. Fecal occult blood positive. Pt given IV PPI in ED. Continue BID PPI. GI following and plans for EGD today. Monitor.

## 2017-07-27 NOTE — SUBJECTIVE & OBJECTIVE
Interval History: Pt without complaints today. Denies CP, SOB, N/V. States still having soft, dark BMs.     Review of Systems   Constitutional: Negative for chills and fever.   Respiratory: Negative for cough and shortness of breath.    Cardiovascular: Negative for chest pain.   Gastrointestinal: Positive for blood in stool. Negative for abdominal pain, nausea and vomiting.   Neurological: Negative for dizziness and light-headedness.     Objective:     Vital Signs (Most Recent):  Temp: 97.7 °F (36.5 °C) (07/27/17 0800)  Pulse: 83 (07/27/17 0800)  Resp: 18 (07/27/17 0800)  BP: (!) 152/63 (07/27/17 0800)  SpO2: 96 % (07/27/17 1009) Vital Signs (24h Range):  Temp:  [97.7 °F (36.5 °C)-98.8 °F (37.1 °C)] 97.7 °F (36.5 °C)  Pulse:  [72-97] 83  Resp:  [18-20] 18  SpO2:  [96 %-97 %] 96 %  BP: (112-168)/(54-69) 152/63     Weight: 61.5 kg (135 lb 8 oz)  Body mass index is 26.46 kg/m².    Intake/Output Summary (Last 24 hours) at 07/27/17 1022  Last data filed at 07/27/17 0900   Gross per 24 hour   Intake              500 ml   Output             1570 ml   Net            -1070 ml      Physical Exam   Constitutional: She is oriented to person, place, and time. No distress.   Cardiovascular: Normal rate and regular rhythm.    Pulmonary/Chest: Breath sounds normal. No respiratory distress.   Abdominal: Soft. Bowel sounds are normal. She exhibits no distension. There is no tenderness.   Neurological: She is alert and oriented to person, place, and time.   Psychiatric: She has a normal mood and affect. Her behavior is normal. Judgment and thought content normal.   Nursing note and vitals reviewed.      Significant Labs:   BMP:   Recent Labs  Lab 07/27/17  0845   *      K 4.2      CO2 23   BUN 10   CREATININE 1.0   CALCIUM 8.4*   MG 1.3*     CBC:   Recent Labs  Lab 07/25/17  1818 07/26/17  0412 07/27/17  0845   WBC 10.96 9.56 5.69   HGB 6.3* 8.9* 8.7*   HCT 19.2* 26.2* 25.9*    130* 131*     Magnesium:    Recent Labs  Lab 07/26/17  0412 07/27/17  0845   MG 1.3* 1.3*     POCT Glucose:   Recent Labs  Lab 07/26/17  1529 07/26/17 2008 07/27/17  0614   POCTGLUCOSE 97 195* 135*       Significant Imaging: no new

## 2017-07-27 NOTE — ASSESSMENT & PLAN NOTE
-168.  On lisinopril 20 mg daily which was held for slight WERO and metoprolol tartrate 25 mg BID which is ordered with parameters. Monitor. PRN hydralazine.

## 2017-07-27 NOTE — CONSULTS
[facility]  Gastroenterology  Consult Note    Patient Name: Neha Conrad  MRN: [id number]  Admission Date: 7/25/2017  Hospital Length of Stay: 2 days  Code Status: Full Code   Attending Provider: Luis Akins MD   Consulting Provider: Dianna Meza MD  Primary Care Physician: Saeed Armando MD  Principal Problem:Acute upper GI bleed    Consults  Subjective:     HPI:  This is a 65yo female with a history of cirrhosis suspected due to DOSS, here with dark stools.  She noted multiple episodes of black, tarry stool several days prior to admission of a moderate amount. No hematochezia or abdominal pain.  Over the last several days it has become lighter in color.  She is unclear of her last upper endoscopy but it appears that in 2015 without evidence of varices mentioned.  She denies NSAID usage, unintentional weight loss dark urine or hematemesis.  The last few months she has had episodes of bilious emesis.  She denies dysphagia or odynophagia.  No other exacerbating or relieving factors or other associated symptoms.    Past Medical History:   Diagnosis Date    Calcification of aorta     Cirrhosis of liver     Coronary artery disease     Diabetes mellitus     GERD (gastroesophageal reflux disease)     Hyperlipidemia     Hypertension     Renal disorder     Thyroid disease     Ventral hernia 05/08/2017    on CT, referred to Dr Gaytan       Past Surgical History:   Procedure Laterality Date    APPENDECTOMY      CARDIAC SURGERY      CAROTID ENDARTERECTOMY      Wolf    CHOLECYSTECTOMY      CORONARY ARTERY BYPASS GRAFT      2005    HYSTERECTOMY      SHOULDER SURGERY Right 2017    TONSILLECTOMY      ULNAR NERVE TRANSPOSITION Right 03/2016       Review of patient's allergies indicates:   Allergen Reactions    Farxiga [dapagliflozin] Anaphylaxis     Couldn't void    Chantix [varenicline]     Clindamycin     Itraconazole      Family History     Problem Relation (Age of Onset)    Cancer  Brother    Diabetes Father    Heart disease Mother    Pulmonary embolism Mother        Social History Main Topics    Smoking status: Current Every Day Smoker     Packs/day: 0.50     Years: 0.50    Smokeless tobacco: Never Used    Alcohol use No    Drug use: No    Sexual activity: Not on file     Review of Systems   Constitutional: Positive for appetite change and fatigue. Negative for chills and fever.   HENT: Negative for postnasal drip and trouble swallowing.    Eyes: Negative for pain and redness.   Respiratory: Negative for cough, choking, chest tightness and shortness of breath.    Cardiovascular: Negative for chest pain and leg swelling.   Gastrointestinal: Positive for abdominal distention, blood in stool, diarrhea and nausea. Negative for abdominal pain, anal bleeding and constipation.   Endocrine: Negative for cold intolerance and heat intolerance.   Genitourinary: Negative for difficulty urinating and hematuria.   Musculoskeletal: Positive for arthralgias. Negative for back pain.   Skin: Negative for color change and pallor.   Allergic/Immunologic: Negative for environmental allergies and food allergies.   Neurological: Negative for dizziness and light-headedness.   Hematological: Negative for adenopathy. Bruises/bleeds easily.   Psychiatric/Behavioral: Negative for agitation and behavioral problems.     Objective:     Vital Signs (Most Recent):  Temp: 97.8 °F (36.6 °C) (07/27/17 0405)  Pulse: 72 (07/27/17 0405)  Resp: 18 (07/27/17 0405)  BP: (!) 112/54 (07/27/17 0405)  SpO2: 96 % (07/27/17 0420) Vital Signs (24h Range):  Temp:  [97.8 °F (36.6 °C)-98.8 °F (37.1 °C)] 97.8 °F (36.6 °C)  Pulse:  [72-97] 72  Resp:  [18-20] 18  SpO2:  [95 %-97 %] 96 %  BP: (112-168)/(54-69) 112/54     Weight: 61.5 kg (135 lb 8 oz) (07/25/17 2124)  Body mass index is 26.46 kg/m².      Intake/Output Summary (Last 24 hours) at 07/27/17 0924  Last data filed at 07/27/17 0405   Gross per 24 hour   Intake              250 ml    Output             1420 ml   Net            -1170 ml       Lines/Drains/Airways     Peripheral Intravenous Line                 Peripheral IV - Single Lumen 07/25/17 1220 Left Antecubital 1 day                Physical Exam   Constitutional: She is oriented to person, place, and time. She appears well-developed and well-nourished. No distress.   HENT:   Head: Normocephalic and atraumatic.   Eyes: Conjunctivae are normal. No scleral icterus.   Neck: Normal range of motion. Neck supple. No tracheal deviation present. No thyromegaly present.   Cardiovascular: Normal rate and regular rhythm.  Exam reveals no gallop and no friction rub.    No murmur heard.  Pulmonary/Chest: Effort normal and breath sounds normal. No respiratory distress. She has no wheezes.   Abdominal: Soft. Bowel sounds are normal. She exhibits distension. There is no tenderness.   Musculoskeletal:        Right wrist: She exhibits normal range of motion and no tenderness.        Left wrist: She exhibits normal range of motion and no tenderness.   Lymphadenopathy:        Head (right side): No submental and no submandibular adenopathy present.        Head (left side): No submental and no submandibular adenopathy present.   Neurological: She is alert and oriented to person, place, and time.   No asterixis   Skin: Skin is warm and dry. No rash noted. She is not diaphoretic. No erythema.   Psychiatric: She has a normal mood and affect. Her behavior is normal.   Nursing note and vitals reviewed.      Significant Labs:  CBC:   Recent Labs  Lab 07/25/17  1818 07/26/17  0412 07/27/17  0845   WBC 10.96 9.56 5.69   HGB 6.3* 8.9* 8.7*   HCT 19.2* 26.2* 25.9*    130* 131*     CMP:   Recent Labs  Lab 07/25/17  1210 07/26/17  0412   * 53*   CALCIUM 10.6* 8.5*   ALBUMIN 3.6  --    PROT 6.6  --    * 134*   K 4.8 3.2*   CO2 19* 20*    107   BUN 15 9   CREATININE 1.2 0.9   ALKPHOS 81  --    ALT 14  --    AST 19  --    BILITOT 0.4  --       Coagulation: INR 1 on 7/22    Significant Imaging:  Imaging results within the past 24 hours have been reviewed..    Assessment/Plan:     * Acute upper GI bleed    - h/o melena, improving  - plan EGD   - risk factors, anemia, CAD h/o CABG, thrombocytopenia  - cont PPI  - monitor hct  - transfuse as needed            Thank you for your consult. I will follow-up with patient. Please contact us if you have any additional questions.    Dianna Meza MD  Gastroenterology  [facility]

## 2017-07-27 NOTE — PLAN OF CARE
Signed out from pacu per Dr Edwards. Report called to L'en[facility] Rn/4A. Transported to room 458 via stretcher,sr upx2.

## 2017-07-27 NOTE — TRANSFER OF CARE
Anesthesia Transfer of Care Note    Patient: Neha Conrad    Procedure(s) Performed: Procedure(s) (LRB):  ESOPHAGOGASTRODUODENOSCOPY (EGD) (N/A)    Patient location: PACU    Anesthesia Type: MAC    Transport from OR: Transported from OR on room air with adequate spontaneous ventilation    Post pain: adequate analgesia    Post assessment: no apparent anesthetic complications and tolerated procedure well    Post vital signs: stable    Level of consciousness: awake, alert and oriented    Nausea/Vomiting: no nausea/vomiting    Complications: none    Transfer of care protocol was followed      Last vitals:   Visit Vitals  BP (!) 141/63 (BP Location: Left arm, Patient Position: Lying, BP Method: Automatic)   Pulse 76   Temp 36.7 °C (98.1 °F) (Oral)   Resp 18   Ht 5' (1.524 m)   Wt 61.5 kg (135 lb 8 oz)   SpO2 95%   Breastfeeding? No   BMI 26.46 kg/m²

## 2017-07-27 NOTE — ASSESSMENT & PLAN NOTE
Acute blood loss anemia  6 episodes of soft, dark stool since admission. Noted drop in H&H from baseline 11&30 to 7&21. Improved to 8.9&26 s/p transfusion of 2 units PRBC on 7/25. 8.7&26 today. Fecal occult blood positive. Pt given IV PPI in ED. Continue BID PPI. GI following and plans for EGD today. Monitor.

## 2017-07-27 NOTE — ASSESSMENT & PLAN NOTE
Thrombocytopenia   MELD 6.  Pt with family h/o same.  No elevation in T. Bili, AST, or ALT.  No ascites on exam. Noted portal hypertension on last CT abdomen scan. Continue home furosemide at 20 instead of 40 mg.  Pt on metoprolol 25 mg BID which is continued here with parameters. Platelets 131 today. No chemoprophylaxis.

## 2017-07-28 NOTE — PATIENT INSTRUCTIONS
When You Have Gastrointestinal (GI) Bleeding    Blood in your vomit or stool can be a sign of gastrointestinal (GI) bleeding. GI bleeding can be scary. But the cause may not be serious. You should always see a doctor if GI bleeding occurs.  The GI tract  The GI tract is the path through which food travels in the body. Food passes from the mouth down the esophagus (the tube from the mouth to the stomach). Food begins to break down in the stomach. It then moves through the duodenum, the first part of the small intestine. Nutrients are absorbed as food travels through the small intestine. What is left passes into the colon (large intestine) as waste. The colon removes water from the waste. Waste continues from the colon to the rectum (where stool is stored). Waste then leaves the body through the anus.  Causes of GI bleeding  GI bleeding can be caused by many different problems. Some of the more common causes include:  · Swollen veins in the anus (hemorrhoids)  · Swollen veins in the esophagus (varices)  · Sore on the lining of the GI tract (ulcer)  · Cuts or scrapes in the mouth or throat  · Infection caused by germs such as bacteria or parasites  · Food allergies, such as milk allergy in young children  · Medicines  · Inflammation of the GI tract (gastritis or esophagitis)  · Colitis (Crohn's disease or ulcerative colitis  · Cancer (tumors or polyps)  · Abnormal pouches in the colon (diverticula)  · Tears in the esophagus or anus  · Nosebleed  · Abnormal blood vessels in the GI tract (angiodysplasia)  Diagnosing the cause of blood in stool  If blood is coming out in your stool, you may have a lower GI tract problem or a very fast upper GI tract bleed. Bleeding from the GI tract can be bright red. Or it may look dark and tarry. Tests may also find blood in your stool that cant be seen with the eye (occult blood). To find out the cause, tests that may be ordered include:  · Blood tests. A blood sample is taken and  sent to a lab for exam.  · Hemoccult test. Checks a stool sample for blood.  · Stool culture. Checks a stool sample for bacteria or parasites.  · X-ray, ultrasound, or CT scan. Imaging tests that take pictures of the digestive tract.  · Colonoscopy or sigmoidoscopy. This test uses a flexible tube with a tiny camera. The tube is inserted through your anus into your rectum to see the inside of your colon. Your provider can also take a tiny tissue sample (biopsy) and treat a bleeding source  Diagnosing the cause of blood in vomit  If you are vomiting blood or something that looks like coffee grounds, you may have an upper GI tract problem. To find the cause, tests that may be done include:  · Upper Endoscopy. A flexible tube with a tiny camera is inserted through your mouth and throat to see inside your upper GI tract. This lets your provider take a tiny tissue sample (biopsy) and treat a bleeding source.  · Nasogastric lavage. This can tell if you have upper GI or lower GI bleeding.  · X-ray, ultrasound, or CT scan. Imaging tests that take pictures of your digestive tract.  · Upper GI series. X-rays of the upper part of your GI tract taken from inside your body.  · Enteroscopy. This sends a flexible tube or a small, swallowed capsule camera into your small intestine.  When to call your healthcare provider  Call your healthcare provider right away if you have any of the following:  · Bleeding from your mouth or anus that can't be stopped  · Fever of 100.4°F (38.0°) or higher  · Bleeding along with feeling lightheaded or dizzy  · Signs of fluid loss (dehydration). These include a dry, sticky mouth, decreased urine output; and very dark urine.  · Belly (abdominal) pain   Date Last Reviewed: 7/1/2017  © 7085-8319 [employer]. [address] [place] [address], [place] [address]. All rights reserved. This information is not intended as a substitute for professional medical care. Always follow your healthcare  professional's instructions.

## 2017-07-28 NOTE — PLAN OF CARE
Future Appointments  Date Time Provider Department Center   8/1/2017 4:00 PM Jam Wilkerson Jr., MD [facility] [place]   8/3/2017 9:00 AM Arely Hernandez MD [facility]   8/22/2017 9:30 AM [facility]     [facility] brochure given to patient.       07/28/17 1027   Final Note   Assessment Type Final Discharge Note   Discharge Disposition Home   Discharge planning education complete? Yes   Hospital Follow Up  Appt(s) scheduled? Yes   Discharge plans and expectations educations in teach back method with documentation complete? Yes   Offered [facility] Bedside Delivery? Yes   Discharge/Hospital Encounter Summary to (non-[facility]) PCP No   Referral to Outpatient Case Management complete? No   Referral to / orders for Home Health Complete? No   30 day supply of medicines given at discharge, if documented non-compliance / non-adherence? No   Any social issues identified prior to discharge? No   Did you assess the readiness or willingness of the family or caregiver to support self management of care? No   Right Care Referral Info   Post Acute Recommendation No Care     Laura Lei, RN, CCM, CMSRN  RN Transition Navigator  [phone]

## 2017-07-28 NOTE — PLAN OF CARE
Discharge instructions given to patient and daughter. Patient and daughter verbalized understanding. Iv removed with tip intact. Patient wheeled down.

## 2017-07-28 NOTE — DISCHARGE SUMMARY
[facility] Hospital Medicine  Discharge Summary      Patient Name: Neha Conrad  MRN: [id number]  Admission Date: 7/25/2017  Hospital Length of Stay: 2 days  Discharge Date and Time: 7/27/2017  9:45 PM  Attending Physician: Luis Akins MD   Discharging Provider: Luis Akins MD  Primary Care Provider: Saeed Armando MD      HPI:   Neha Conrad is a 66 year white woman with type 2 diabetes mellitus, coronary artery disease status post coronary artery bypass graft in 2005, hypertension, hyperlipidemia, carotid artery disease status post carotid endarterectomy, gastroesophageal reflux disease, non-alcoholic cirrhosis (diagnosed at [facility] in 2015), and  history of ventral hernia repair with mesh.  She  has chronic, intermittent abdominal pan.  She lives at home with her daughters Cathy and Jing.  Her primary care physician is Dr. Saeed Armando.  Her gastroenterologist is Dr. Jma Wilkerson.     She was seen in the ED on 7/9/17 for worsening abdominal pain and started on dicyclomine and she was also treated with a 7 day course of ciprofloxacin for a urinary tract infection.  She was last admitted at [facility] 7/21/17 - 7/23/2017 for nausea, vomiting and diarrhea with positive C. diff antigen and toxin (7/22/2017).  She was started on metronidazole and was feeling well on the day of discharge.  On the morning of 7/25/17, she awoke feeling well then started with abdominal cramping, vomiting, and dark semi-solid stool.  Her daughters encouraged her to return to the ED.     ED workup was significant for reduction in hemoglobin to 7 and hematocrit to 21.  Lactic acid was elevated at 3.5.  Blood cultures were collected and she was given IV fluids and oral vancomycin for presumed infectious diarrhea.   C. diff antigen and toxin were negative however.  She had occult blood positive stool.  She was admitted to [facility] with  Gastroenterology consult.  Transfusion with 2 units of pRBCs were started in the ED.      Procedure(s) (LRB):  ESOPHAGOGASTRODUODENOSCOPY (EGD) (N/A)      Indwelling Lines/Drains at time of discharge: None    Hospital Course:   Hemoglobin and hematocrit improved to 8.9 and 26 after transfusion.  She continued to have soft, dark bowel movements.    Upper GI endoscopy 7/27/17:  The examined esophagus was normal. Localized mild inflammation characterized by congestion (edema) was found in the gastric antrum. Biopsies were taken with a cold forceps for histology. Verification of patient identification for the specimen was done. Estimated blood loss was minimal. The examined duodenum was normal.    Gastroenterologist Dr. Reed Medina performed EGD on 7/27/17, finding no active bleeding and no etiology of upper GI bleed.  Mild localized antral gastritis was seen and biopsies taken.  She was discharged home on pantoprazole 40 mg daily for 14 days.     Consults:   Consults         Status Ordering Provider     Inpatient consult to Anesthesiology  Once     Provider:  Malu Coffey MD    Acknowledged BINDU JACKSON     Inpatient consult to Gastroenterology  Once     Provider:  Jam Wilkerson Jr., MD    Acknowledged PURA FUENTES          Significant Diagnostic Studies:     Recent Labs  Lab 07/25/17  1818 07/26/17  0412 07/27/17  0845   WBC 10.96 9.56 5.69   HGB 6.3* 8.9* 8.7*   HCT 19.2* 26.2* 25.9*    130* 131*       Final Active Diagnoses:    Diagnosis Date Noted POA    Acute blood loss anemia [D62] 07/27/2017 Yes    Hypomagnesemia [E83.42] 07/26/2017 Yes    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 difficile colitis [A04.7] 07/25/2017 Yes    Cirrhosis of liver without ascites [K74.60] 06/20/2017 Yes     Chronic    History of stroke [Z86.73] 03/04/2016 Yes     Chronic    S/P CABG (coronary artery bypass graft) [Z95.1] 03/04/2016 Not  Applicable     Chronic    Essential hypertension [I10]  Yes     Chronic    Type 2 diabetes mellitus without complication, without long-term current use of insulin [E11.9] 09/23/2015 Yes     Chronic    Gastroesophageal reflux disease without esophagitis [K21.9] 09/23/2015 Yes    Coronary artery disease involving nonautologous biological coronary bypass graft without angina pectoris [I25.810] 09/23/2015 Yes     Chronic    Anxiety [F41.9] 09/23/2015 Yes    Depression [F32.9] 09/23/2015 Yes      Problems Resolved During this Admission:    Diagnosis Date Noted Date Resolved POA    PRINCIPAL PROBLEM:  Acute upper GI bleed [K92.2] 07/25/2017 07/27/2017 Yes    Hypokalemia [E87.6] 07/26/2017 07/27/2017 Yes      Discharged Condition: good    Disposition: Home or Self Care    Follow Up:  Follow-up Information     Saeed Armando MD.    Specialty:  Family Medicine  Contact information:  [address] [address] [phone]                 Patient Instructions:     Diet Diabetic 1800 Calories     Activity as tolerated     Call MD for:   Order Comments: Blood in your bowel movements or black bowel movements.       Medications:  Reconciled Home Medications:   Discharge Medication List as of 7/27/2017  9:37 PM      START taking these medications    Details   pantoprazole (PROTONIX) 40 MG tablet Take 1 tablet (40 mg total) by mouth once daily., Starting Fri 7/28/2017, Until Fri 8/11/2017, Normal         CONTINUE these medications which have NOT CHANGED    Details   alendronate (FOSAMAX) 70 MG tablet TAKE 1 TABLET BY MOUTH EVERY 7 DAYS, Normal      alprazolam (XANAX) 0.5 MG tablet Take 1 tablet (0.5 mg total) by mouth 2 (two) times daily. as needed for anxiety., Starting Fri 6/16/2017, Normal      aspirin 81 MG Chew Take 81 mg by mouth once daily., Until Discontinued, Historical Med      calcium-vitamin D3 500 mg(1,250mg) -200 unit per tablet Take 1 tablet by mouth every evening. , Historical Med      clopidogrel  (PLAVIX) 75 mg tablet Take 1 tablet (75 mg total) by mouth once daily., Starting 12/30/2016, Until Discontinued, Normal      CONTOUR NEXT STRIPS Strp 1 strip 3 (three) times daily. , Starting 8/21/2015, Until Discontinued, Historical Med      cyanocobalamin, vitamin B-12, (VITAMIN B-12) 1,000 mcg Subl Place 1,000 mcg under the tongue once daily., Starting 9/29/2016, Until Discontinued, OTC      ferrous sulfate 325 mg (65 mg iron) Tab tablet Take 1 tablet (325 mg total) by mouth 2 (two) times daily., Starting 9/29/2016, Until Discontinued, OTC      fish oil-omega-3 fatty acids 300-1,000 mg capsule Take 2 g by mouth 2 (two) times daily., Until Discontinued, Historical Med      furosemide (LASIX) 40 MG tablet TAKE 1 TABLET BY MOUTH ONCE DAILY, Normal      gabapentin (NEURONTIN) 300 MG capsule Take 1 capsule (300 mg total) by mouth every evening. For nerve pain in abdomen, Starting 12/30/2016, Until Discontinued, Normal      glimepiride (AMARYL) 4 MG tablet Take 1 tablet (4 mg total) by mouth before breakfast. For diabetes, Starting 1/2/2017, Until Discontinued, Normal      hydrocodone-acetaminophen 5-325mg (NORCO) 5-325 mg per tablet Take 1 tablet by mouth every 6 (six) hours as needed for Pain., Starting Sun 7/9/2017, Print      levothyroxine (SYNTHROID) 88 MCG tablet Take 1 tablet (88 mcg total) by mouth once daily., Starting 4/15/2015, Until Discontinued, Normal      lisinopril (PRINIVIL,ZESTRIL) 40 MG tablet TAKE 1/2 TABLET(20MG) BY MOUTH DAILY, No Print      magnesium oxide (MAG-OX) 400 mg tablet Take 1 tablet (400 mg total) by mouth 2 (two) times daily., Starting 8/4/2016, Until Discontinued, OTC      metformin (GLUCOPHAGE) 500 MG tablet TAKE 2 TABLETS BY MOUTH TWICE DAILY, Normal      metoprolol tartrate (LOPRESSOR) 25 MG tablet TAKE 1 TABLET BY MOUTH TWICE DAILY, Normal      metronidazole (FLAGYL) 500 MG tablet Take 1 tablet (500 mg total) by mouth every 8 (eight) hours., Starting Sun 7/23/2017, Until Sun  8/6/2017, Normal      MICROLET LANCET Misc 3 (three) times daily. , Starting 8/26/2015, Until Discontinued, Historical Med      nitroGLYCERIN (NITROSTAT) 0.4 MG SL tablet Place 0.4 mg under the tongue every 5 (five) minutes as needed for Chest pain., Until Discontinued, Historical Med      olanzapine (ZYPREXA) 10 MG tablet TAKE 1/2 TABLET BY MOUTH EVERY EVENING, Normal      simvastatin (ZOCOR) 40 MG tablet TAKE 1 TABLET BY MOUTH EVERY DAY, Normal      SITagliptan (JANUVIA) 100 MG Tab Take 1 tablet (100 mg total) by mouth once daily., Starting 4/3/2017, Until Tue 4/3/18, Normal      venlafaxine (EFFEXOR-XR) 75 MG 24 hr capsule TAKE ONE CAPSULE BY MOUTH ONCE DAILY, Normal           Time spent on the discharge of patient: 35 minutes    Luis Akins MD  Department of Hospital Medicine  [facility]

## 2017-07-28 NOTE — TELEPHONE ENCOUNTER
----- Message from Leonor Holbrook sent at 7/28/2017 10:17 AM CDT -----  Contact: patient advised to call her daughter maranda/  [phone]  Patient states she needs to see Dr. Armando sooner then what's available. (8/10/17). Possibly Monday.  Patient states she needs to discuss what happened while she was in the hospital. Please advise

## 2017-08-01 NOTE — PROGRESS NOTES
"Subjective:      Patient ID: Neha Conrad is a 66 y.o. female.    Chief Complaint: Rectal Bleeding and Follow-up    HPI:   Patient presents for GI follow-up.  Since her last outpatient visit on June 20 she has been treated with Cipro in early July followed by an admission July 21 for nausea vomiting diarrhea.  Stool was positive for C. difficile and patient was discharged on Flagyl July 23.  2 days later she presented to the emergency room with complaints of abdominal pain.  She is noted to have a hematocrit of 21 and heme-positive stool.  She was admitted and transfused.  Upper endoscopy was negative for bleeding sites.  No varices were noted.  Currently the patient is in no distress.  She is having "coffee ground" appearance to the stool.  This may be secondary to oral iron replacement.  The diarrhea is persisting at a level of about 3 per day.  She has several more days of Flagyl on hand.  I will extend the course of Flagyl until her diarrhea resolves.  We'll also add probiotics.  .  Recent past history:  Early June 2017 underwent ventral hernia repair.  Hernia was in the epigastric area.    Is maintained on Plavix, Fosamax, Zyprexa.  Past medical history includes prior appendectomy, cholecystectomy, hysterectomy, coronary artery bypass, coronary artery stent placement.  Patient had a previous [facility] admission in mid 2015 for abdominal pain.  Was noticed to have cirrhosis on imaging studies.  Workup was negative for viral hepatitis or autoimmune hepatitis.  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  She had a brother who underwent liver transplant due to Cooper induced cirrhosis     Prior EGD/colonoscopy evaluation of May 2015 demonstrated sparse sigmoid diverticula and a 2 mm polyp.  Gastroscopy relatively unremarkable.  Duodenal biopsies normal.  Gastric biopsy: Gastritis.  Patient is maintained on PPI.  Had a trial of Reglan for nausea in the past with questionable improvement  Patient " continues to smoke cigarettes.  Family history positive for colon polyps.      Review of patient's allergies indicates:   Allergen Reactions    Farxiga [dapagliflozin] Anaphylaxis     Couldn't void    Chantix [varenicline]     Clindamycin     Itraconazole      Past Medical History:   Diagnosis Date    Calcification of aorta     Cirrhosis of liver     Coronary artery disease     Diabetes mellitus     GERD (gastroesophageal reflux disease)     Hyperlipidemia     Hypertension     Renal disorder     Thyroid disease     Ventral hernia 05/08/2017    on CT, referred to Dr Gaytan     Past Surgical History:   Procedure Laterality Date    APPENDECTOMY      CARDIAC SURGERY      CAROTID ENDARTERECTOMY      [place]    CHOLECYSTECTOMY      CORONARY ARTERY BYPASS GRAFT      2005    HYSTERECTOMY      SHOULDER SURGERY Right 2017    TONSILLECTOMY      ULNAR NERVE TRANSPOSITION Right 03/2016     Family History   Problem Relation Age of Onset    Pulmonary embolism Mother     Heart disease Mother     Diabetes Father     Cancer Brother      Social History     Social History    Marital status:      Spouse name: N/A    Number of children: N/A    Years of education: N/A     Occupational History    Not on file.     Social History Main Topics    Smoking status: Current Every Day Smoker     Packs/day: 0.50     Years: 0.50    Smokeless tobacco: Never Used    Alcohol use No    Drug use: No    Sexual activity: Not on file     Other Topics Concern    Not on file     Social History Narrative    No narrative on file       Review of Systems:  Constitutional: Negative for appetite change.   HENT: Negative for trouble swallowing.   Eyes: Negative for photophobia.   Respiratory: Negative for cough and shortness of breath.   Cardiovascular: Negative for palpitations.   Gastrointestinal: See HPI for details.  Genitourinary: Negative for frequency and hematuria.   Skin: Negative for rash.   Neurological: Negative  for weakness and headaches.   Hematological: Negative.   Psychiatric/Behavioral: Negative for suicidal ideas and behavioral problems.     Objective:     /61 (BP Location: Right arm, Patient Position: Sitting)   Pulse 84   Ht 5' (1.524 m)   Wt 61.1 kg (134 lb 9.6 oz)   BMI 26.29 kg/m²     Physical Exam:  Eyes: Pupils are equal, round, and reactive to light.   Neck: Supple. No mass  Cardiovascular: Regular rhythm . No murmur   Pulmonary/Chest: Lungs clear   Abdominal: Soft. No mass palpated. Nontender, no guarding. Positive bowel sounds   Musculoskeletal: No deformity. No edema.   Psychiatric: Alert and oriented    Assessment:     1. Diarrhea of presumed infectious origin    2. C. difficile colitis    3. Cirrhosis of liver without ascites, unspecified hepatic cirrhosis type      Plan:     Neha was seen today for rectal bleeding and follow-up.    Diagnoses and all orders for this visit:    Diarrhea of presumed infectious origin    C. difficile colitis    Cirrhosis of liver without ascites, unspecified hepatic cirrhosis type      Plan:  Additional metronidazole prescription provided to extend treatment course  CBC.  Add probiotics.  If she develops a dramatic drop in H&H, will need to consider capsule endoscopy.  Close outpatient follow-up

## 2017-08-01 NOTE — PATIENT INSTRUCTIONS
Clostridium difficile Infection  Clostridium difficile or C. diff bacteria can be very harmful. They affect the intestinal tract. They can cause symptoms ranging from mild diarrhea to severe inflammation of the large intestine (colon). C. diff infection is most common during or days to weeks after treatment with antibiotics. Anyone can become infected. But the risk is greatly increased for people in hospitals and for people living in nursing homes or long-term care facilities. This is because antibiotic use is common there. Germs also spread easily in these places.   What causes C. diff Infection?  The stomach and intestines have hundreds of kinds of bacteria. Many of these bacteria actually help keep harmful bacteria like C. diff from causing problems. Small amounts of C. diff are normal and do not cause problems. When a person takes an antibiotic, the normal balance of good and bad bacteria may be affected. There may be too few good bacteria and too many harmful bacteria like C diff. In hospitals and nursing homes, C. diff may be spread from an infected patient to other patients. This may occur when staff or visitors touch infected patients or objects such as bed rails, stethoscopes, or bedpans and then touch other patients or surfaces.  What are the symptoms of C. diff infection?  About half of the people with C. diff infection have no symptoms. Yet they can still pass the infection to others. Others do have symptoms. These include:  · Watery diarrhea, which may contain mucus  · Pain and cramping  · Fever  Some who are infected develop serious problems. Symptoms include:  · Abdominal pain  · Abdominal swelling  · Nausea and vomiting  · Little or no diarrhea  How is C. diff infection diagnosed?  To confirm the infection, a sample of stool is tested for the bacteria or the toxins made by the bacteria.  How is C diff infection treated?  Your healthcare provider will tell you to stop taking any antibiotics you have  been prescribed, based on your healthcare needs. He or she may prescribe different medicines as needed. In certain cases, you may be given an antibiotic directed at the C. diff infection. Talk with your healthcare provider before stopping or starting any medicines.  · Fluids are often given intravenously (IV) or through a vein. This helps replace fluids lost through diarrhea.  · You may need surgery if treatment does not cure severe symptoms  To lessen symptoms:  · Drink plenty of fluids to replace water lost through diarrhea. Talk with your healthcare provider or nurse about which fluids are best.  · Follow your healthcare providers instructions for when and what to eat.  · Unless your healthcare provider tells you to do so, do not take medicines for diarrhea.  · Tell your healthcare provider if symptoms return. Even after treatment, C. difficile may come back.  What are the complications of C. diff infection?  Complications include:  · Dehydration  · Electrolyte imbalances  · Low protein in the blood  · Severe dilation or widening of the large intestine  · Bowel perforation  · Low blood pressure  · Kidney failure  · Inflammation or infection throughout the body  · Death  How is C. diff prevented?  Hospitals and nursing homes take these steps to help prevent C. diff infections:  · Limiting use of antibiotics. Giving antibiotics only when needed can help reduce C. diff infections.  · Handwashing. Hospital staff should wash their hands before and after treating each patient. They should also wash their hands after touching any surface in patients' rooms. Soap and water work better than alcohol-based hand .  · Protective clothing. Healthcare workers should wear gloves and a gown when entering the room of a patient with C. diff infection. They should remove these items before leaving and then wash their hands.  · Private rooms. People with C. diff should be in private rooms. Or they may share rooms with others  who have the same infection.  · Thorough cleaning. Equipment and rooms should be cleaned and disinfected every day.  · Education. Patients and visitors should be shown the best ways to avoid infection.  You can do the following to help prevent C. diff:  · Take antibiotics only when you really need them. Antibiotics dont help treat illnesses caused by viruses. This includes colds and the flu. Dont ask for antibiotics from your healthcare provider if he or she says they wont work.  · When you are given antibiotics, take them as directed. Dont take more or less than the dosage prescribed. Do not take them for shorter or longer than your provider tells you to, even if you feel better.  · Wash your hands carefully. Do this after using the bathroom and before eating. Use plenty of soap and warm water. Alcohol-based hand  may not work against C. diff germs.  Everyone can help prevent C. diff:  · In a hospital or care facility:  ¨ Wash your hands well before and after visiting someone who has C. diff infection. Use soap and water. Alcohol-based hand  may not work against C. diff.  ¨ If the staff asks you to, wear gloves. Take any other steps you are asked to follow to help prevent infection.  · At home:  ¨ Wear gloves when caring for a family member with C. diff infection. Throw the gloves away after each use. Then wash your hands well.  ¨ Wash the persons clothes, bed linen, and towels separately. Use hot water. Use both detergent and liquid bleach.  ¨ Disinfect surfaces in the persons room. This includes the phone, light switches, and remote controls.  · Practice good handwashing  ¨ Use warm water and plenty of soap. Rub your hands together well.  ¨ Clean the whole hand. Wash under nails, between fingers, and up the wrists.  ¨ Wash for at least 15 seconds to 20 seconds.   ¨ Rinse. Let the water run down your fingers, not up your wrists.  ¨ Dry your hands well. Then use a paper towel to turn off the  faucet and open the door.  Date Last Reviewed: 8/8/2014  © 9791-7608 [employer]. [address] [place] [address], [place] [address]. All rights reserved. This information is not intended as a substitute for professional medical care. Always follow your healthcare professional's instructions.

## 2017-08-02 PROBLEM — E83.19 IRON OVERLOAD: Status: ACTIVE | Noted: 2017-01-01

## 2017-08-02 PROBLEM — T45.4X1A: Status: ACTIVE | Noted: 2017-01-01

## 2017-08-02 PROBLEM — K74.60 CIRRHOSIS OF LIVER: Status: ACTIVE | Noted: 2017-01-01

## 2017-08-02 NOTE — PROGRESS NOTES
Subjective:      Patient ID: Neha Conrad is a 66 y.o. female.    Chief Complaint: Follow-up (hospital f/u)    In hospital twice for c diff, upper scope and transfusion, saw Dr Martinez yesterday; had labs ; stgable H and H; A1C 5.8; was told to stop Januvia, I agree, also, for now, stop metformin; that leaves glimiperide 4 mg only once a day; stop iron and multivitamin; EGD, normal, no bleeding; had colonoxcopoy 2 years ago; plan is to serial H and H and wait for it to iincrease, or if it drops, another transfusion and investigae colon and if needed, small bowels.her with family.      Review of Systems   Constitutional: Positive for fatigue.   HENT: Negative.    Respiratory: Negative.    Cardiovascular: Negative.    Gastrointestinal: Negative.    Endocrine: Negative.    Genitourinary: Negative.    Musculoskeletal: Negative.    Neurological: Positive for weakness.   Psychiatric/Behavioral: Negative.    All other systems reviewed and are negative.    Objective:     Physical Exam   Constitutional: She is oriented to person, place, and time.   ch ill appearing   HENT:   Head: Normocephalic.   Eyes: Conjunctivae and EOM are normal. Pupils are equal, round, and reactive to light.   pale   Neck: Normal range of motion. Neck supple.   Cardiovascular: Normal rate, regular rhythm and normal heart sounds.    Pulmonary/Chest: Effort normal and breath sounds normal.   Musculoskeletal: Normal range of motion.   Neurological: She is alert and oriented to person, place, and time. She has normal reflexes.   Skin: Skin is warm and dry.   Psychiatric: She has a normal mood and affect. Her behavior is normal. Judgment and thought content normal.     Assessment:     1. Iron overload    2. Acute blood loss anemia    3. History of stroke    4. S/P CABG (coronary artery bypass graft)    5. Hypomagnesemia    6. Hyperlipidemia, unspecified hyperlipidemia type    7. Hepatic cirrhosis, unspecified hepatic cirrhosis type    8. Old MI  (myocardial infarction)    9. Essential hypertension    10. Coronary artery disease involving nonautologous biological coronary bypass graft without angina pectoris    11. Anxiety    12. Depression, unspecified depression type      Plan:     New Prescriptions    No medications on file     Discontinued Medications    FERROUS SULFATE 325 MG (65 MG IRON) TAB TABLET    Take 1 tablet (325 mg total) by mouth 2 (two) times daily.    METFORMIN (GLUCOPHAGE) 500 MG TABLET    TAKE 2 TABLETS BY MOUTH TWICE DAILY    SITAGLIPTAN (JANUVIA) 100 MG TAB    Take 1 tablet (100 mg total) by mouth once daily.     Modified Medications    Modified Medication Previous Medication    LISINOPRIL (PRINIVIL,ZESTRIL) 40 MG TABLET lisinopril (PRINIVIL,ZESTRIL) 40 MG tablet       One tab twice a day    TAKE 1/2 TABLET(20MG) BY MOUTH DAILY       Iron overload  -     CBC auto differential; Standing    Acute blood loss anemia    History of stroke    S/P CABG (coronary artery bypass graft)    Hypomagnesemia    Hyperlipidemia, unspecified hyperlipidemia type    Hepatic cirrhosis, unspecified hepatic cirrhosis type    Old MI (myocardial infarction)    Essential hypertension    Coronary artery disease involving nonautologous biological coronary bypass graft without angina pectoris    Anxiety    Depression, unspecified depression type    Other orders  -     lisinopril (PRINIVIL,ZESTRIL) 40 MG tablet; One tab twice a day  Dispense: 180 tablet; Refill: 3

## 2017-08-07 NOTE — TELEPHONE ENCOUNTER
[facility] Note    I have reviewed the patient's pathology results and discussed these results with the patient and her daughter.  She is being followed by Dr. Wilkerson for further GI work up of her anemia as an outpatient.

## 2017-08-09 NOTE — TELEPHONE ENCOUNTER
----- Message from Alise Moraes sent at 8/9/2017  4:30 PM CDT -----  Contact: cell# [phone]  Patient would like to be seen tomorrow    Symptoms: blood sugar    How long has patient had these symptoms: Says levels have been wacky. Running in the 200s - 300s; last test was 375 before eating;    If unable to be seen , can something be called into patient pharmacy?    Pharmacy name:     Any drug allergies:

## 2017-08-11 NOTE — TELEPHONE ENCOUNTER
I spoke to daughter; diarrhea gone; was told by Dr Thorpe resume ABX; pt had stopped metformin at my request due to normal A1C and diarrhea, has already resumed metformin, but will not take januvia yet.

## 2017-08-16 NOTE — PROGRESS NOTES
Subjective:      Patient ID: Neha Conrad is a 66 y.o. female.    Chief Complaint: No chief complaint on file.    262 sugar lately at home and here and last a1c was amazingly good, in 5.3 suddenly 3 weeks ago?!;  Will recheck and resume januvia was 100 mg, was stopped because it was thought it was given stomach, however has c.diff and is being treated with flaygl with [facility]      Review of Systems   Constitutional: Negative.    HENT: Negative.    Respiratory: Negative.    Cardiovascular: Negative.    Gastrointestinal: Negative.    Endocrine: Negative.    Genitourinary: Negative.    Musculoskeletal: Negative.    Psychiatric/Behavioral: Negative.    All other systems reviewed and are negative.    Objective:     Physical Exam   Constitutional: She is oriented to person, place, and time. She appears well-developed and well-nourished.   HENT:   Head: Normocephalic.   Eyes: Conjunctivae and EOM are normal. Pupils are equal, round, and reactive to light.   Neck: Normal range of motion. Neck supple.   Cardiovascular: Normal rate, regular rhythm and normal heart sounds.    Pulmonary/Chest: Effort normal and breath sounds normal.   Musculoskeletal: Normal range of motion.   Neurological: She is alert and oriented to person, place, and time. She has normal reflexes.   Skin: Skin is warm and dry.   Psychiatric: She has a normal mood and affect. Her behavior is normal. Judgment and thought content normal.   Nursing note and vitals reviewed.    Assessment:     1. Type 2 diabetes mellitus without complication, without long-term current use of insulin      Plan:     New Prescriptions    SITAGLIPTIN (JANUVIA) 100 MG TAB    Take 1 tablet (100 mg total) by mouth once daily.     Discontinued Medications    No medications on file     Modified Medications    No medications on file       Type 2 diabetes mellitus without complication, without long-term current use of insulin  -     POCT Glucose    Other orders  -     SITagliptin  (JANUVIA) 100 MG Tab; Take 1 tablet (100 mg total) by mouth once daily.  Dispense: 90 tablet; Refill: 3

## 2017-08-21 NOTE — TELEPHONE ENCOUNTER
----- Message from Saeed Armando MD sent at 8/20/2017  4:04 PM CDT -----  CALL PT TESTS ARE NORMAL

## 2017-08-21 NOTE — TELEPHONE ENCOUNTER
----- Message from Saeed Armnado MD sent at 8/20/2017  4:04 PM CDT -----  CALL PT TESTS ARE NORMAL

## 2017-08-23 NOTE — TELEPHONE ENCOUNTER
----- Message from Alise Moraes sent at 8/23/2017  2:05 PM CDT -----  Contact: daughter - Jing  Patient now has Medicare. Needs new Rx sent to [facility] for diabetic testing supplies  Make sure medicare guideline information is listed on Rx   [phone]

## 2017-09-06 NOTE — PROGRESS NOTES
Subjective:      Patient ID: Neha Conrad is a 66 y.o. female.    Chief Complaint: Follow-up (1 month f/u)    Off balance and tired lately; no falls holds onto wallstired all day; no pain; breathing ok; good appetitie and voids and Bm good; no fever; always depressed; taking rx; anniversary of son's death; here with daughtger; nothing makes her happy; never been manic; has had 3 cvas documented on Jan 2017 MRI; anxiety when in a car; not crying; not sucidal;       Review of Systems   Musculoskeletal: Positive for gait problem.   Psychiatric/Behavioral: Positive for confusion and dysphoric mood. Negative for sleep disturbance and suicidal ideas. The patient is nervous/anxious.      Objective:     Physical Exam   Constitutional: She is oriented to person, place, and time. She appears well-developed and well-nourished.   HENT:   Head: Normocephalic.   Eyes: Conjunctivae and EOM are normal. Pupils are equal, round, and reactive to light.   Neck: Normal range of motion. Neck supple.   Cardiovascular: Normal rate, regular rhythm and normal heart sounds.    Pulmonary/Chest: Effort normal and breath sounds normal.   Musculoskeletal: Normal range of motion.   Neurological: She is alert and oriented to person, place, and time. She has normal reflexes.   Stiff posture gait   Skin: Skin is warm and dry.   Psychiatric: She has a normal mood and affect. Her behavior is normal. Judgment and thought content normal.   Nursing note and vitals reviewed.    Assessment:     1. Depression, unspecified depression type    2. Ataxia    3. Smokes    4. Memory loss    5. Type 2 diabetes mellitus without complication, without long-term current use of insulin    6. Anxiety    7. Cerebrovascular accident (CVA), unspecified mechanism      Plan:     New Prescriptions    No medications on file     Discontinued Medications    GABAPENTIN (NEURONTIN) 300 MG CAPSULE    Take 1 capsule (300 mg total) by mouth every evening. For nerve pain in abdomen     HYDROCODONE-ACETAMINOPHEN 5-325MG (NORCO) 5-325 MG PER TABLET    Take 1 tablet by mouth every 6 (six) hours as needed for Pain.    PANTOPRAZOLE (PROTONIX) 40 MG TABLET    Take 1 tablet (40 mg total) by mouth once daily.     Modified Medications    Modified Medication Previous Medication    VENLAFAXINE (EFFEXOR-XR) 150 MG CP24 venlafaxine (EFFEXOR-XR) 75 MG 24 hr capsule       Take 1 capsule (150 mg total) by mouth once daily.    TAKE ONE CAPSULE BY MOUTH ONCE DAILY       Depression, unspecified depression type    Ataxia    Smokes    Memory loss  Comments:  vascular      Type 2 diabetes mellitus without complication, without long-term current use of insulin    Anxiety    Cerebrovascular accident (CVA), unspecified mechanism    Other orders  -     venlafaxine (EFFEXOR-XR) 150 MG Cp24; Take 1 capsule (150 mg total) by mouth once daily.  Dispense: 30 capsule; Refill: 11     increase effexor, stop gabapentin, no change in DM tgreatment; stop smoking;

## 2017-09-26 NOTE — PATIENT INSTRUCTIONS

## 2017-09-26 NOTE — PROGRESS NOTES
"Subjective:      Patient ID: Neha Conrad is a 66 y.o. female.    Chief Complaint: Follow-up    HPI:   Patient presents for GI follow-up.  Following her August office visit she continued Flagyl and probiotics for C. difficile colitis.  Diarrhea has completely resolved.  She continues to take a probiotic daily.  I advised her to be cautious about any antibiotic use.  She is to reserve antibiotic use for emergencies only.  Heartburn recurs when she misses her PPI.  She is out of that medication.    In June she was admitted for severe anemia and C. difficile colitis.  Upper endoscopy was negative for bleeding sites.  No varices were noted.    Recent past history:  Early June 2017 underwent ventral hernia repair.  Hernia was in the epigastric area.    Is maintained on Plavix, Fosamax, Zyprexa.  Past medical history includes prior appendectomy, cholecystectomy, hysterectomy, coronary artery bypass, coronary artery stent placement.  Patient had a previous [facility] admission in mid 2015 for abdominal pain.  Was noted to have cirrhosis on imaging studies.  Workup was negative for viral hepatitis or autoimmune hepatitis.  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  She had a brother who underwent liver transplant due to Cooper induced cirrhosis     Prior EGD/colonoscopy evaluation of May 2015 demonstrated sparse sigmoid diverticula and a 2 mm polyp.  Gastroscopy relatively unremarkable.  Duodenal biopsies normal.  Gastric biopsy: Gastritis.  Patient is maintained on PPI.  Had a trial of Reglan for nausea in the past with questionable improvement  Patient continues to smoke cigarettes.  Family history positive for colon polyps.    Review of patient's allergies indicates:   Allergen Reactions    Farxiga [dapagliflozin] Anaphylaxis     Couldn't void    Bentyl [dicyclomine] Other (See Comments)     "dazed"     Chantix [varenicline]     Clindamycin     Itraconazole      Past Medical History:   Diagnosis Date    " Anemia     Calcification of aorta     Cirrhosis of liver     Coronary artery disease     Diabetes mellitus     GERD (gastroesophageal reflux disease)     Hyperlipidemia     Hypertension     Renal disorder     Thyroid disease     Ventral hernia 05/08/2017    on CT, referred to Dr Gaytan     Past Surgical History:   Procedure Laterality Date    APPENDECTOMY      CARDIAC SURGERY      CAROTID ENDARTERECTOMY      [facility]    CHOLECYSTECTOMY      CORONARY ARTERY BYPASS GRAFT      2005    HYSTERECTOMY      SHOULDER SURGERY Right 2017    TONSILLECTOMY      ULNAR NERVE TRANSPOSITION Right 03/2016     Family History   Problem Relation Age of Onset    Pulmonary embolism Mother     Heart disease Mother     Diabetes Father     Cancer Brother      Social History     Social History    Marital status:      Spouse name: N/A    Number of children: N/A    Years of education: N/A     Occupational History    Not on file.     Social History Main Topics    Smoking status: Current Every Day Smoker     Packs/day: 0.50     Years: 0.50    Smokeless tobacco: Never Used    Alcohol use No    Drug use: No    Sexual activity: Not on file     Other Topics Concern    Not on file     Social History Narrative    No narrative on file       Review of Systems:  Constitutional: Negative for appetite change.   HENT: Negative for trouble swallowing.   Eyes: Negative for photophobia.   Respiratory: Negative for cough and shortness of breath.   Cardiovascular: Negative for palpitations.   Gastrointestinal: See HPI for details.  Genitourinary: Negative for frequency and hematuria.   Skin: Negative for rash.  Positive for edema  Neurological: Negative for weakness and headaches.   Hematological: Negative.   Psychiatric/Behavioral: Negative for suicidal ideas and behavioral problems.     Objective:     /61 (BP Location: Right arm, Patient Position: Sitting)   Pulse 84   Ht 5' (1.524 m)   Wt 60.8 kg (134 lb)   BMI  26.17 kg/m²     Physical Exam:  Eyes: Pupils are equal, round, and reactive to light.   Neck: Supple. No mass  Cardiovascular: Regular rhythm . No murmur   Pulmonary/Chest: Lungs clear   Abdominal: Soft. No mass palpated. Nontender, no guarding. Positive bowel sounds   Musculoskeletal: No deformity.  1+ lower extremity edema.   Psychiatric: Alert and oriented    Assessment:     1. Gastroesophageal reflux disease, esophagitis presence not specified    2. Cirrhosis of liver without ascites, unspecified hepatic cirrhosis type    3. C. difficile colitis      Plan:     Neha was seen today for follow-up.    Diagnoses and all orders for this visit:    Gastroesophageal reflux disease, esophagitis presence not specified  -     pantoprazole (PROTONIX) 40 MG tablet; Take 1 tablet (40 mg total) by mouth once daily.    Cirrhosis of liver without ascites, unspecified hepatic cirrhosis type    C. difficile colitis      Plan:  Continue PPI and antireflux measures  Patient indicates Dr. Armando is following her CBC but the reports are via [facility]

## 2017-10-09 PROBLEM — R10.9 CHRONIC ABDOMINAL PAIN: Chronic | Status: ACTIVE | Noted: 2017-01-01

## 2017-10-09 PROBLEM — I70.0 AORTIC ATHEROSCLEROSIS: Chronic | Status: ACTIVE | Noted: 2017-01-01

## 2017-10-09 PROBLEM — K76.6 PORTAL HYPERTENSION: Chronic | Status: ACTIVE | Noted: 2017-01-01

## 2017-10-09 PROBLEM — D69.59 THROMBOCYTOPENIA DUE TO DEFECTIVE PLATELET PRODUCTION: Chronic | Status: RESOLVED | Noted: 2017-01-01 | Resolved: 2017-01-01

## 2017-10-09 PROBLEM — K55.9 ISCHEMIC COLITIS: Status: ACTIVE | Noted: 2017-01-01

## 2017-10-09 PROBLEM — K55.9: Status: ACTIVE | Noted: 2017-01-01

## 2017-10-09 PROBLEM — N17.9 ACUTE KIDNEY INJURY: Status: ACTIVE | Noted: 2017-01-01

## 2017-10-09 PROBLEM — I70.0: Chronic | Status: ACTIVE | Noted: 2017-01-01

## 2017-10-09 PROBLEM — G89.29 CHRONIC ABDOMINAL PAIN: Chronic | Status: ACTIVE | Noted: 2017-01-01

## 2017-10-09 PROBLEM — K75.81 LIVER CIRRHOSIS SECONDARY TO NASH: Chronic | Status: ACTIVE | Noted: 2017-01-01

## 2017-10-09 PROBLEM — K92.1 HEMATOCHEZIA: Status: ACTIVE | Noted: 2017-01-01

## 2017-10-09 NOTE — ED NOTES
Pt presents to ED with c/o diarrhea with BRB watery stools that started this AM with approx 10 episodes. Pt has hx of c. Diff and was recently admitted to hospital for same. Pt also c/o generalized abdominal pain. Pt appears to be in mild distress and very pale and weak. Pt family at bedside. Pt placed on continuous pulse ox and bp cuff. Pt VSS at present. Will continue to monitor.

## 2017-10-09 NOTE — ED PROVIDER NOTES
"Encounter Date: 10/9/2017       History     Chief Complaint   Patient presents with    Diarrhea     c/o bloody diarrhea, nausea, vomiting, and weakness since this morning. Pt was treated for C.Diff and discharged home in July     Patient is a 66-year-old female with a past medical history of severe atherosclerosis of aorta, diabetes, hypertension, hyperlipidemia prior CABG, C. difficile colitis 3 months ago who presents to emergency department for bloody stools.  This morning she started with nonbloody vomiting and diarrhea 2-3 episodes that then progressed to bloody hematochezia that started at 3 PM with several episodes since that time and it is now having mild crampy lower abdominal pain.  She feels diffusely weak.  She's not having any syncope chest pain shortness of breath or other bleeding.  She does take Plavix.  In July she was admitted for melena and had an upper endoscopy which did not show any evidence of varices, but did have mild gastritis of the antrum.  She has not been on any antibiotics recently except for Flagyl a few weeks ago.  She has no history of diverticulitis or diverticulosis or a formal diagnosis of ischemic colitis.          Review of patient's allergies indicates:   Allergen Reactions    Farxiga [dapagliflozin] Anaphylaxis     Couldn't void    Bentyl [dicyclomine] Other (See Comments)     "dazed"     Chantix [varenicline]     Clindamycin     Itraconazole      Past Medical History:   Diagnosis Date    Anemia     Calcification of aorta     Cirrhosis of liver     Coronary artery disease     Diabetes mellitus     GERD (gastroesophageal reflux disease)     Hyperlipidemia     Hypertension     Renal disorder     Thyroid disease     Ventral hernia 05/08/2017    on CT, referred to Dr Gaytan     Past Surgical History:   Procedure Laterality Date    APPENDECTOMY      CARDIAC SURGERY      CAROTID ENDARTERECTOMY      Wolf    CHOLECYSTECTOMY      CORONARY ARTERY BYPASS GRAFT      " 2005    HYSTERECTOMY      SHOULDER SURGERY Right 2017    TONSILLECTOMY      ULNAR NERVE TRANSPOSITION Right 03/2016     Family History   Problem Relation Age of Onset    Pulmonary embolism Mother     Heart disease Mother     Diabetes Father     Cancer Brother      Social History   Substance Use Topics    Smoking status: Current Every Day Smoker     Packs/day: 0.50     Years: 0.50    Smokeless tobacco: Never Used    Alcohol use No     Review of Systems   Constitutional: Positive for fatigue. Negative for appetite change, chills and fever.   HENT: Negative for ear pain, rhinorrhea and sore throat.    Eyes: Negative for pain and visual disturbance.   Respiratory: Negative for cough and shortness of breath.    Cardiovascular: Negative for chest pain.   Gastrointestinal: Positive for abdominal pain, blood in stool, diarrhea, nausea and vomiting. Negative for constipation.   Genitourinary: Negative for difficulty urinating, dysuria, flank pain and hematuria.   Musculoskeletal: Negative for arthralgias, back pain and myalgias.   Skin: Negative for rash.   Neurological: Positive for light-headedness. Negative for syncope, weakness, numbness and headaches.   Hematological: Does not bruise/bleed easily.   Psychiatric/Behavioral: Negative for agitation and confusion.   All other systems reviewed and are negative.      Physical Exam     Initial Vitals [10/09/17 1628]   BP Pulse Resp Temp SpO2   (!) 67/43 94 (!) 22 97.6 °F (36.4 °C) 99 %      MAP       51         Physical Exam    Nursing note and vitals reviewed.  Constitutional: She appears well-developed and well-nourished. No distress.   HENT:   Head: Normocephalic and atraumatic.   Mouth/Throat: Oropharynx is clear and moist.   Eyes: Conjunctivae and EOM are normal. Pupils are equal, round, and reactive to light.   Neck: Normal range of motion. Neck supple. No JVD present.   Cardiovascular: Normal rate, regular rhythm, normal heart sounds and intact distal pulses.  Exam reveals no gallop and no friction rub.    No murmur heard.  Pulmonary/Chest: Breath sounds normal. No respiratory distress. She has no decreased breath sounds. She has no wheezes. She has no rales.   Abdominal: Soft. Bowel sounds are normal. There is tenderness (mild tenderness in the bilateral lower quadrants with no rebound or guarding). There is no rebound.   Genitourinary:   Genitourinary Comments: Gross hematochezia on exam.   Musculoskeletal: Normal range of motion. She exhibits no edema.   Neurological: She is alert and oriented to person, place, and time. She has normal strength. No sensory deficit.   Skin: Skin is warm and dry. No rash noted.   Psychiatric: She has a normal mood and affect.         ED Course   Critical Care  Date/Time: 10/9/2017 5:29 PM  Performed by: YESY SCHUMACHER.  Authorized by: YESY SCHUMACHER   Direct patient critical care time: 25 minutes  Additional history critical care time: 5 minutes  Ordering / reviewing critical care time: 5 minutes  Documentation critical care time: 5 minutes  Consulting other physicians critical care time: 5 minutes  Total critical care time (exclusive of procedural time) : 45 minutes  Critical care was necessary to treat or prevent imminent or life-threatening deterioration of the following conditions: circulatory failure.  Critical care was time spent personally by me on the following activities: discussions with consultants, evaluation of patient's response to treatment, examination of patient, ordering and review of laboratory studies, ordering and review of radiographic studies, pulse oximetry, re-evaluation of patient's condition, ordering and performing treatments and interventions and review of old charts.        Labs Reviewed   CBC W/ AUTO DIFFERENTIAL   COMPREHENSIVE METABOLIC PANEL   PROTIME-INR   APTT   LACTIC ACID, PLASMA   TYPE & SCREEN             Medical Decision Making:   Patient is having active lower GI bleeding here and is on  Plavix.  She may have mesenteric ischemia.  Her initial blood pressure is hypotensive and I gave her IV fluids.  She is now developing abdominal pain which could be related to an ischemic colitis event.  I spoke with the hospitalist and we will admit the patient to the ICU.  Labs are pending at this time.  Will discuss with GI.     spoke with GI and with surgery.  The surgeon, Dr. Ruff is aware that the patient likely has ischemic colitis.  We'll continue with hydration.  Patient be admitted to the medicine service.  I discussed the findings of the CT scan.  I started Zosyn on the patient.    10:02 PM patient still has pain.  Vital signs are stable except for mild tachycardia repeat abdominal exam does not show any rebound or guarding.  Patient will be admitted.                       ED Course      Clinical Impression:   The primary encounter diagnosis was Lower GI bleeding. A diagnosis of Hematochezia was also pertinent to this visit.                           Jam Resendiz MD  10/09/17 4258

## 2017-10-09 NOTE — HPI
Neha Conrad is a 66 year white woman with type 2 diabetes mellitus, hypertension, hyperlipidemia, coronary artery disease status post coronary artery bypass graft in 2005, aortic atherosclerosis with infrarenal stenosis, carotid artery disease status post carotid endarterectomy, history of stroke, cirrhosis due to nonalcoholic hepatosteatosis with portal hypertension (diagnosed at [facility] in 2015), gastroesophageal reflux disease, history of ventral hernia repair with mesh in June 2017, and history of iron overload on 7/25/17.  She has chronic, intermittent abdominal pan.  She lives at home with her daughters Cathy and Jing.  Her primary care physician is Dr. Saeed Armando.  Her gastroenterologist is Dr. Jam Wilkerson.               She was seen in the ED on 7/9/17 for worsening abdominal pain and started on dicyclomine and she was also treated with a 7 day course of ciprofloxacin for a urinary tract infection.  She was admitted at [facility] 7/21/17 - 7/23/2017 for nausea, vomiting and diarrhea with positive C. diff antigen and toxin (7/22/2017).  She was started on metronidazole and was feeling well on the day of discharge.  She was hospitalized again 7/25/17-7/27/17 after continuing to have abdominal cramping and vomiting, and was found to have decreased blood counts, an initial blood pressure of 99/53, acute kidney injury (creatinine 1.2), and lactic acidosis (3.4) despite clearance of C. Difficile toxin.  Upper GI endoscopy showed mild gastritis.  Lactate only improved to 2.8 by discharge.              She presented to [facility] ED on 10/9/17 after nausea, vomiting, diarrhea, hematochezia, and lastly right lower quadrant abdominal pain, beginning when she awoke in the morning.  Like her last hospitalization, her initial blood pressure was again low, but lower at 67/43, she again had acute kidney injury, but worse, with BUN 60 and creatinine  2.7, and lactate was again elevated, but more so than before, at 7.8.  Her right lower quadrant was tender.  With her significant atherosclerosis, ED Dr. Jam Resendiz was concerned for ischemic colitis, which would explain her chronic intermittent abdominal pain.  She was given 2 liters of IV saline with improvement of blood pressures.  She takes lisinopril daily but no longer takes furosemide.  She also takes metformin, which is contraindicated in liver disease due to risk of lactic acidosis.  She was admitted to [facility] with plan for packed RBC transfusion pending non-contrast CT results.  The CT results confirmed Dr. Resendiz's suspicions, additionally showing ischemic enteritis.

## 2017-10-10 PROBLEM — D69.6 THROMBOCYTOPENIA: Status: ACTIVE | Noted: 2017-01-01

## 2017-10-10 PROBLEM — E87.0 HYPERNATREMIA: Status: ACTIVE | Noted: 2017-01-01

## 2017-10-10 NOTE — ASSESSMENT & PLAN NOTE
Ischemic enteritis  Hematochezia  Acute kidney injury  Chronic abdominal pain  Aortic atherosclerosis  Stenosis of infrarenal abdominal aorta due to atherosclerosis  Patient and daughter did not know what atherosclerosis was much less ischemic colitis, so explained that her smoking caused her to have poor circulation and can lead to bowel gangrene and cause her to die if untreated.  Metformin did not help.    NPO except ice chips and medications.  IV saline at 125 mL/hr.  Ciprofloxacin daily and metronidazole TID.  Although not currently a surgical abdomen, she can decompensate, so asked Dr. Resendiz to page General Surgery on call to evaluate her.  Hold antiplatelet therapy for now due to bleeding but would be useful long-term for atherosclerosis.  Might consider Interventional Cardiology for revascularization later on.  Monitor lactic acid.

## 2017-10-10 NOTE — HOSPITAL COURSE
Taken to the OR for emergent exploratory the morning of 10/10/17 which found severe ischemia of entire small intestine and right colon. She was then closed and discussion with the family regarding the poor prognosis was had by Dr. Monge. We had further discussions through the day and family decided to continue current care and not escalate at this time. Overnight to the morning of 10/11/17, she continued to worsen with acute renal failure and persistent shock and lactic acidosis. Family decided to withdraw care. She was pronounced dead at 0750 on 10/11/17.

## 2017-10-10 NOTE — PLAN OF CARE
Pt in OR for emergent surgery. TN met in waiting room with multiple family members. While meeting with family, Dr. Ruff (surgeon) came to meet with family to inform family he is unable to perform surgery due to the ischemia of the small bowels. Emotional support provided and TN offered to call  for family. Family agreed and TN called at[facility]. Dr. Thacker on unit  TN to follow.     10/10/17 1000   Discharge Assessment   Assessment Type Discharge Planning Assessment   Confirmed/corrected address and phone number on facesheet? Yes   Assessment information obtained from? Caregiver   Communicated expected length of stay with patient/caregiver yes   Prior to hospitilization cognitive status: Alert/Oriented   Prior to hospitalization functional status: Independent   Current cognitive status: Coma/Sedated/Intubated   Current Functional Status: Completely Dependent   Lives With child(sharmaine), adult   Able to Return to Prior Arrangements yes   Is patient able to care for self after discharge? Unable to determine at this time (comments)   Patient's perception of discharge disposition other (comments)  (zohreh)   Readmission Within The Last 30 Days no previous admission in last 30 days   Patient currently being followed by outpatient case management? No   Patient currently receives any other outside agency services? No   Equipment Currently Used at Home none   Do you have any problems affording any of your prescribed medications? TBD   Is the patient taking medications as prescribed? yes   Does the patient have transportation home? Yes  (children)   Transportation Available family or friend will provide   Does the patient receive services at the [facility]? No   Discharge Plan A (ZOHREH)   Patient/Family In Agreement With Plan unable to assess

## 2017-10-10 NOTE — ANESTHESIA POSTPROCEDURE EVALUATION
Anesthesia Post Evaluation    Patient: Neha Conrad    Procedure(s) Performed: Procedure(s) (LRB):  EXPLORATORY-LAPAROTOMY (N/A)    Final Anesthesia Type: general  Patient location during evaluation: ICU  Patient participation: No - Unable to Participate, Intubation  Level of consciousness: sedated  Post-procedure vital signs: reviewed and stable  PONV status at discharge: No PONV  Anesthetic complications: no      Cardiovascular status: hemodynamically stable  Respiratory status: ventilator  Hydration status: euvolemic  Follow-up not needed.        Visit Vitals  BP (!) 84/49   Pulse (!) 113   Temp 36.8 °C (98.2 °F) (Oral)   Resp (!) 30   Ht 5' (1.524 m)   Wt 59.9 kg (132 lb 0.9 oz)   SpO2 100%   Breastfeeding? No   BMI 25.79 kg/m²       Pain/Raman Score: Pain Assessment Performed: Yes (10/10/2017  9:00 AM)  Presence of Pain: non-verbal indicators absent (10/10/2017  9:00 AM)  Pain Rating Prior to Med Admin: 10 (10/10/2017  3:47 AM)  Pain Rating Post Med Admin: 5 (10/10/2017  4:17 AM)

## 2017-10-10 NOTE — ANESTHESIA PROCEDURE NOTES
Intubation    Diagnosis: respiratory failure  Doctor requesting consult: matt  Patient location during procedure: ICU  Procedure start time: 10/10/2017 6:48 AM  Timeout: 10/10/2017 6:48 AM  Procedure end time: 10/10/2017 6:51 AM  Staffing  Anesthesiologist: LUDIN HOUSE  Performed: anesthesiologist   Anesthesiologist was present at the time of the procedure.  Preanesthetic Checklist  Completed: patient identified, site marked, surgical consent, pre-op evaluation, timeout performed, IV checked, risks and benefits discussed, monitors and equipment checked and anesthesia consent given  Intubation  Indication: respiratory distress  Pre-oxygenation. Induction: intravenous rapid sequence, mask ventilation: easy mask.  Intubation: postinduction, laryngoscopy direct, Soniya 4.  Endotracheal Tube: oral, 7.5 mm ID  Attempts: 1, Grade II - cords partially seen  Complicating Factors: none  Tube secured at 23 cm at the lips.  Findings post-intubation: bilateral breath sounds, positive ETCO2, atraumatic / condition of teeth unchanged  Position Confirmation: auscultation

## 2017-10-10 NOTE — CHAPLAIN
One family member was at bedside at this time and the rest were in the surgery waiting area.  The youngest member of the family present was Estephanie and she is 10 years old.  I gave Estephanie a [place] kit and explained to her that it is to help her to talk about her feelings about loosing her grandmother. I showed her the dry erase board, puzzle, crayons, and [place]ga Bear.  She was cheerful and said she understood.  The rest of the family were very attentive to my explanation and I reminded them to express their sadness and feeling as well.  They were all grateful.

## 2017-10-10 NOTE — ANESTHESIA PREPROCEDURE EVALUATION
10/10/2017  Neha Conrad is a 66 y.o., female undergoing exploratory laparotomy for ischemic bowel. Past medical history: CAD, Dm, GERD, liver cirrhosis.     Pre-op Assessment      I have reviewed the Medications.     Review of Systems  Anesthesia Hx:  Denies Hx of Anesthetic complications  History of prior surgery of interest to airway management or planning: heart surgery. Previous anesthesia: General  Denies Personal Hx of Anesthesia complications.   Social:  No Alcohol Use, Smoker    Hematology/Oncology:         -- Anemia:   Cardiovascular:   Exercise tolerance: good Hypertension Past MI CAD   hyperlipidemia    Pulmonary:  Pulmonary Normal    Renal/:   Chronic Renal Disease    Hepatic/GI:   GERD Liver Disease, (Cirrhosis) -Ventral Hernia  -C. Difficile with diarrhea  -intermittent nausea  -last emesis 5 days ago   Neurological:   CVA (1999) short-term memory impairment    Endocrine:   Diabetes Hypothyroidism    Psych:   Psychiatric History anxiety depression          Physical Exam  General:  Well nourished    Airway/Jaw/Neck:  Airway Findings: Pre-Existing Airway Tube(s): Oral Endotracheal tube    Eyes/Ears/Nose:  Eyes/Ears/Nose Findings:     Chest/Lungs:  Chest/Lungs Findings: Clear to auscultation     Heart/Vascular:  Heart Findings: Rate: Normal  Rhythm: Regular Rhythm        Mental Status:  Mental Status Findings:         Anesthesia Plan  Type of Anesthesia, risks & benefits discussed:  Anesthesia Type:  general  Patient's Preference:   Intra-op Monitoring Plan: standard ASA monitors  Intra-op Monitoring Plan Comments:   Post Op Pain Control Plan:   Post Op Pain Control Plan Comments:   Induction:   IV  Beta Blocker:         Informed Consent:    ASA Score: 4  emergent   Day of Surgery Review of History & Physical:  There are no significant changes.      Anesthesia Plan Notes: Place arterial line  and intubate emergently        Ready For Surgery From Anesthesia Perspective.

## 2017-10-10 NOTE — CONSULTS
"Hospital Medicine Consult    Date of Admit: 10/9/2017    Chief Complaint     Diarrhea (c/o bloody diarrhea, nausea, vomiting, and weakness since this morning. Pt was treated for C.Diff and discharged home in July)    Subjective:      History of Present Illness:  Neha Conrad is a 66 y.o. F/M w/ a PMHx of DM, CAD, s/p bypass graft in 2005, hx of cirrhosis, recent C diff who presented to the ED with N/V, D, hematochezia and a story of chronic intermittent abdominal pain.  There is concern for ischemic bowel given patient's history and hemodynamic instability. Pt was intubated in ICU and surgery was consulted for evaluation of surgical intervention.    Past Medical History:  Past Medical History:   Diagnosis Date    Anemia     Calcification of aorta     Cirrhosis of liver     Coronary artery disease     Diabetes mellitus     GERD (gastroesophageal reflux disease)     Hyperlipidemia     Hypertension     Renal disorder     Thyroid disease     Ventral hernia 05/08/2017    on CT, referred to Dr Gaytan     Past Surgical History:  Past Surgical History:   Procedure Laterality Date    APPENDECTOMY      CARDIAC SURGERY      CAROTID ENDARTERECTOMY      Wolf    CHOLECYSTECTOMY      CORONARY ARTERY BYPASS GRAFT      2005    HYSTERECTOMY      SHOULDER SURGERY Right 2017    TONSILLECTOMY      ULNAR NERVE TRANSPOSITION Right 03/2016    VENTRAL HERNIA REPAIR       Allergies:  Review of patient's allergies indicates:   Allergen Reactions    Farxiga [dapagliflozin] Anaphylaxis     Couldn't void    Bentyl [dicyclomine] Other (See Comments)     "dazed"     Chantix [varenicline]     Clindamycin     Itraconazole      Home Medications:  Prior to Admission medications    Medication Sig Start Date End Date Taking? Authorizing Provider   alendronate (FOSAMAX) 70 MG tablet TAKE 1 TABLET BY MOUTH EVERY 7 DAYS  Patient taking differently: TAKE 1 TABLET BY MOUTH EVERY 7 DAYS on Sundays 3/17/17   Saeed Armando MD "   alprazolam (XANAX) 0.5 MG tablet TAKE 1 TABLET BY MOUTH TWICE DAILY AS NEEDED FOR ANXIETY 9/22/17   Saeed Armando MD   aspirin 81 MG Chew Take 81 mg by mouth once daily.    Historical Provider, MD   Bifidobacterium infantis (ALIGN) 4 mg Cap Take by mouth.    Historical Provider, MD   blood sugar diagnostic (CONTOUR TEST STRIPS) Strp Apply 1 each topically 3 (three) times daily. 8/24/17   Saeed Armando MD   calcium-vitamin D3 500 mg(1,250mg) -200 unit per tablet Take 1 tablet by mouth every evening.     Historical Provider, MD   clopidogrel (PLAVIX) 75 mg tablet Take 1 tablet (75 mg total) by mouth once daily. 12/30/16   Saeed Armando MD   cyanocobalamin, vitamin B-12, (VITAMIN B-12) 1,000 mcg Subl Place 1,000 mcg under the tongue once daily. 9/29/16   Saeed Armando MD   fish oil-omega-3 fatty acids 300-1,000 mg capsule Take 2 g by mouth 2 (two) times daily.    Historical Provider, MD   glimepiride (AMARYL) 4 MG tablet Take 1 tablet (4 mg total) by mouth before breakfast. For diabetes 1/2/17   Saeed Armando MD   lancets (ACCU-CHEK SOFTCLIX LANCETS) Misc 1 Units by Misc.(Non-Drug; Combo Route) route 3 (three) times daily before meals. 8/24/17   Saeed Armando MD   levothyroxine (SYNTHROID) 88 MCG tablet Take 1 tablet (88 mcg total) by mouth once daily. 4/15/15   Saeed Armando MD   lisinopril (PRINIVIL,ZESTRIL) 40 MG tablet One tab twice a day 8/2/17   Saeed Armando MD   magnesium oxide (MAG-OX) 400 mg tablet Take 1 tablet (400 mg total) by mouth 2 (two) times daily. 8/4/16   Saeed Armando MD   metformin (GLUCOPHAGE) 500 MG tablet Take 2 tablets (1,000 mg total) by mouth 2 (two) times daily. 8/10/17   Saeed Armando MD   metoprolol tartrate (LOPRESSOR) 25 MG tablet TAKE 1 TABLET BY MOUTH TWICE DAILY 9/7/17   Saeed Armando MD   MICROLET LANCET Misc 3 (three) times daily.  8/26/15   Historical Provider, MD   olanzapine (ZYPREXA) 10 MG tablet TAKE 1/2 TABLET BY MOUTH EVERY EVENING 6/16/17   Saeed QUISPE  MD Prabha   pantoprazole (PROTONIX) 40 MG tablet Take 1 tablet (40 mg total) by mouth once daily. 9/26/17 10/26/17  Jam Wilkerson Jr., MD   simvastatin (ZOCOR) 40 MG tablet TAKE 1 TABLET BY MOUTH EVERY DAY 3/31/17   Saeed Armando MD   SITagliptin (JANUVIA) 100 MG Tab Take 1 tablet (100 mg total) by mouth once daily. 17  Saeed Armando MD   venlafaxine (EFFEXOR-XR) 150 MG Cp24 Take 1 capsule (150 mg total) by mouth once daily. 17   Saeed Armando MD     Family History:  Family History   Problem Relation Age of Onset    Pulmonary embolism Mother     Heart disease Mother     Diabetes Father     Cancer Brother      Social History:  Social History   Substance Use Topics    Smoking status: Current Every Day Smoker     Packs/day: 0.50     Years: 0.50    Smokeless tobacco: Never Used    Alcohol use No     Review of Systems:  Unable to perform due to intubation     Objective:   24 Hour Vital Signs:  BP  Min: 67/43  Max: 186/73  Temp  Av.5 °F (36.4 °C)  Min: 97 °F (36.1 °C)  Max: 97.8 °F (36.6 °C)  Pulse  Av.3  Min: 84  Max: 132  Resp  Av.9  Min: 6  Max: 68  SpO2  Av.1 %  Min: 79 %  Max: 100 %  Height  Av' (152.4 cm)  Min: 5' (152.4 cm)  Max: 5' (152.4 cm)  Weight  Av.3 kg (133 lb 0.4 oz)  Min: 59.9 kg (132 lb 0.9 oz)  Max: 60.8 kg (134 lb)  Body mass index is 25.79 kg/m².  I/O last 3 completed shifts:  In: 1481.2 [I.V.:31.2; IV Piggyback:1450]  Out: 750 [Urine:750]  Last   Physical Examination:  General appearance: alert, appears older than stated age, cooperative, fatigued and mild distress  Head: Normocephalic, without obvious abnormality, atraumatic  Eyes: conjunctivae/corneas clear. PERRL, EOM's intact. Fundi benign.  Heart: regular rate and rhythm, S1, S2 present  Abdomen: abnormal findings:  hypoactive bowel sounds and well-healed ex-lap surgical scars at midline and fullness, distended  Extremities: extremities normal, atraumatic, no cyanosis or  edema  Skin: Skin color, texture, turgor normal. No rashes or lesions  Neurologic: Grossly normal, able to give thumbs-up, opens eye to voice, intubated but not sedated, nods yes to questions of pain    Laboratory:  Most Recent Data:  CBC: Lab Results   Component Value Date    WBC 13.58 (H) 10/10/2017    HGB 9.1 (L) 10/10/2017    HCT 29.9 (L) 10/10/2017     10/10/2017    MCV 78 (L) 10/10/2017    RDW 16.3 (H) 10/10/2017     BMP: Lab Results   Component Value Date     10/10/2017    K 4.3 10/10/2017     10/10/2017    CO2 11 (L) 10/10/2017    BUN 50 (H) 10/10/2017    CREATININE 1.8 (H) 10/10/2017     (H) 10/10/2017    CALCIUM 8.1 (L) 10/10/2017    MG 1.3 (L) 07/27/2017    PHOS 4.3 07/26/2017     LFTs: Lab Results   Component Value Date    PROT 5.7 (L) 10/10/2017    ALBUMIN 2.8 (L) 10/10/2017    BILITOT 1.2 (H) 10/10/2017    AST 35 10/10/2017    ALKPHOS 62 10/10/2017    ALT 20 10/10/2017     Coags:   Lab Results   Component Value Date    INR 1.0 10/09/2017     FLP: Lab Results   Component Value Date    CHOL 153 05/04/2017    HDL 37 (L) 05/04/2017    LDLCALC 49 05/04/2017    TRIG 337 (H) 05/04/2017    CHOLHDL 4.1 05/04/2017     DM: Lab Results   Component Value Date    HGBA1C 5.3 07/25/2017    HGBA1C 8.1 (H) 03/13/2017    HGBA1C 7.6 (H) 12/30/2016    LDLCALC 49 05/04/2017    CREATININE 1.8 (H) 10/10/2017     Thyroid: Lab Results   Component Value Date    TSH 0.788 12/30/2016    FREET4 0.95 07/16/2011     Anemia: Lab Results   Component Value Date    IRON 306 (H) 07/25/2017    TIBC 457 (H) 07/25/2017    FERRITIN 21 07/25/2017    RXULATYM50 373 09/29/2016    FOLATE 9.4 09/23/2015     Cardiac: Lab Results   Component Value Date    TROPONINI <0.006 11/21/2014    BNP 24 08/04/2016     Urinalysis: Lab Results   Component Value Date    LABURIN No significant growth 07/22/2017    COLORU Yellow 07/25/2017    SPECGRAV 1.020 07/25/2017    NITRITE Negative 07/25/2017    GLUCOSEU NEGATIVE 05/04/2017     KETONESU Negative 07/25/2017    UROBILINOGEN Negative 07/25/2017    BILIRUBINUR NEGATIVE 05/04/2017    WBCUA 1 07/25/2017       Trended Lab Data:    Recent Labs  Lab 10/09/17  1711 10/10/17  0320   WBC 34.62* 13.58*   HGB 9.5* 9.1*   HCT 28.9* 29.9*    201   MCV 77* 78*   RDW 16.2* 16.3*   * 136   K 3.7 4.3   CL 96 108   CO2 15* 11*   BUN 60* 50*   CREATININE 2.7* 1.8*   * 351*   PROT 7.7 5.7*   ALBUMIN 3.8 2.8*   BILITOT 0.9 1.2*   AST 63* 35   ALKPHOS 97 62   ALT 29 20     Radiology:  Imaging Results          X-Ray Chest AP Portable (In process)                X-Ray Chest 1 View (Final result)  Result time 10/10/17 07:29:47    Final result by Ludin Rollins MD (10/10/17 07:29:47)                 Impression:        As above.      Electronically signed by: LUDIN ROLLINS  Date:     10/10/17  Time:    07:29              Narrative:    Comparison:3/17/2017    Technique: Single AP portable chest radiograph.    Findings:     Cardiac monitoring leads overlie the chest. There are postsurgical changes of prior median sternotomy with stable fractured sternal wires. Cardiomediastinal silhouette is mildly enlarged. There are low lung volumes bilaterally with resultant vascular crowding. There are patchy bibasilar opacities likely representing subsegmental atelectasis, however developing consolidation is not excluded. The upper lung zones are well aerated. No evidence of pneumothorax.                             CT Abdomen Pelvis  Without Contrast (Final result)     Abnormal  Result time 10/09/17 18:45:51    Final result by Jerardo Kuo MD (10/09/17 18:45:51)                 Impression:       Wall thickening involving the transverse colon and also the rectosigmoid colon.  The findings are suggestive of colitis (ischemic/inflammatory/infectious). Given the extensive atherosclerotic disease and the near occlusion of the infrarenal abdominal aorta,  ischemic colitis is favored.  Suggest clinical  correlation. Contrast-enhanced examination may be obtained for further evaluation.    Fluid distention and wall thickening the small bowel loops.  No definitive evidence of pneumatosis.  The findings represent superimposed ischemic enteritis.    Nodular contour of the liver with extensive variceal vessels in the upper abdomen.  The findings represent portal hypertension in the setting of cirrhosis.      Additional findings as above.    Report has been flagged in the EPIC medical record system.              Electronically signed by: RIKI CANSECO MD  Date:     10/09/17  Time:    18:45              Narrative:    Exam: [id number]  10/09/17  18:16:05 [id number] ([facility]) : CT ABDOMEN PELVIS WITHOUT CONTRAST    Technique:    This exam was done specifically to evaluate for renal stone disease as per request.  Therefore, no oral and no IV contrast was used. Using helical CT technique, axial images of the abdomen and pelvis were obtained from the top of the liver through the base of the bladder.    Comparison:    07/21/2017    Findings:      The lung bases are within normal limits.  The heart is unremarkable.  There are extensive calcifications of the abdominal aorta and branch vessels.  There is diminutive appearance of the distal infrarenal abdominal yolk lesion.  Similar findings were present on the prior examination.  Extensive eccentric calcifications also noted along the course of the abdominal aorta.  There also extensive calcification of the branch vessels of the abdominal aorta.  There are subcentimeter lymph nodes in the retroperitoneal lymph node stations.  Scattered lymph nodes are also noted in the mesentery.    The esophagus is unremarkable.  There is mild distention of the stomach.  The duodenum is within normal limits.  There is wall thickening and fluid distention of the small bowel loops.  No definitive transition point is identified.  The appendix is not visualized.  There are no secondary findings of acute  appendicitis.  There is wall thickening of the transverse colon.  There is also wall thickening involving the rectosigmoid colon.  No definite evidence of obstruction is identified.    There is nodular contour of the liver.  The gallbladder is poorly visualized and may be surgically absent.  There is stable prominence of the common bile duct.  There are multiple splenules.  There is extensive vascularity in the left upper abdomen.  The pancreas is unremarkable.  The adrenal glands are within normal limits.    There is atrophic appearance of both kidneys.  There are subcentimeter nonobstructing stones in both kidneys.  The urinary bladder and the ureters are unremarkable.  The patient is status post hysterectomy.    There is no evidence of free fluid in the abdomen or pelvis.  There is no evidence of free air.  No definitive evidence of pneumatosis, allowing for multiple fluid distention of the small bowel loops.    The psoas margins are unremarkable.  There are postoperative changes in the anterior abdominal wall.  The abdominal is otherwise unremarkable.  There are degenerative changes in the osseous structures.  There is a stable anterior compression deformity of the T12 vertebral body.                            Assessment:     Neha Conrad is a 66 y.o. female with:  Patient Active Problem List    Diagnosis Date Noted    Acute kidney injury 10/09/2017    Hematochezia 10/09/2017    Portal hypertension 10/09/2017    Ischemic colitis 10/09/2017    Ischemic enteritis 10/09/2017    Iron toxicity 08/02/2017    Iron overload 08/02/2017    Acute blood loss anemia 07/27/2017    Hypomagnesemia 07/26/2017    C. difficile colitis 07/25/2017    Chronic abdominal pain 07/25/2017    Aortic atherosclerosis 07/21/2017    Stenosis of infrarenal abdominal aorta due to atherosclerosis 07/21/2017    Liver cirrhosis secondary to DSOS 06/20/2017    H/O adenomatous polyp of colon 06/20/2017    S/P CABG (coronary artery  bypass graft) 03/04/2016    Old MI (myocardial infarction) 03/04/2016    History of stroke 03/04/2016    Essential hypertension     Anxiety 09/23/2015    Coronary artery disease involving nonautologous biological coronary bypass graft without angina pectoris 09/23/2015    Depression 09/23/2015    Neuralgia 09/23/2015    Type 2 diabetes mellitus without complication, without long-term current use of insulin 09/23/2015    Hypertriglyceridemia 09/23/2015    Gastroesophageal reflux disease without esophagitis 09/23/2015        Plan:     Neha Conrad is a 66 y.o. female w/ a h/o CAD w/ hx pf bypass, PAD, recent c diff who presented to ED with hematochezia, N/V/D and hemodynamic instability who required intubation and close monitoring in ICU    Highly suspicious for ischemic bowel    -Serial lactates  -OG tube for secretions  -Abx coverage for sepsis per primary  -Performed emergency consent for ex-lap as team was unable to contact family  -Sedation as needed for vent    Isreal Gomez, HO1   [facility]

## 2017-10-10 NOTE — SUBJECTIVE & OBJECTIVE
Interval History: No operative intervention because of extent of ischemia. Not breathing over the vent.     Review of Systems   Unable to perform ROS: Intubated     Objective:     Vital Signs (Most Recent):  Temp: 98.2 °F (36.8 °C) (10/10/17 1100)  Pulse: (!) 113 (10/10/17 0702)  Resp: (!) 30 (10/10/17 1100)  BP: (!) 84/49 (10/10/17 0649)  SpO2: 100 % (10/10/17 0702) Vital Signs (24h Range):  Temp:  [97 °F (36.1 °C)-98.2 °F (36.8 °C)] 98.2 °F (36.8 °C)  Pulse:  [] 113  Resp:  [6-68] 30  SpO2:  [79 %-100 %] 100 %  BP: ()/(43-76) 84/49     Weight: 59.9 kg (132 lb 0.9 oz)  Body mass index is 25.79 kg/m².    Intake/Output Summary (Last 24 hours) at 10/10/17 1129  Last data filed at 10/10/17 1024   Gross per 24 hour   Intake          3581.23 ml   Output             1760 ml   Net          1821.23 ml      Physical Exam   Constitutional: She appears well-developed. No distress. She is intubated.   HENT:   Head: Normocephalic and atraumatic.   Eyes: Conjunctivae are normal. No scleral icterus.   Cardiovascular: Normal rate and regular rhythm.    Murmur heard.  Pulmonary/Chest: Effort normal and breath sounds normal. She is intubated. No respiratory distress. She has no wheezes.   Abdominal: Soft. She exhibits distension. There is tenderness.   Musculoskeletal: She exhibits no edema or tenderness.   Neurological: She is unresponsive.   Skin: No erythema. There is pallor.   Nursing note and vitals reviewed.      Significant Labs:   ABGs:   Recent Labs  Lab 10/10/17  0704   PH 7.086*   PCO2 36.1   HCO3 10.8*   POCSATURATED 100   BE -19     CBC:   Recent Labs  Lab 10/09/17  1711 10/10/17  0320 10/10/17  1003 10/10/17  1051   WBC 34.62* 13.58*  --  9.07   HGB 9.5* 9.1* 3.9* 3.9*   HCT 28.9* 29.9* 12.0* 12.4*    201  --  127*     CMP:   Recent Labs  Lab 10/09/17  1711 10/10/17  0320 10/10/17  1003   * 136 149*   K 3.7 4.3 3.5   CL 96 108 113*   CO2 15* 11* 23   * 351* 98   BUN 60* 50* 44*    CREATININE 2.7* 1.8* 1.3   CALCIUM 10.1 8.1* 6.6*   PROT 7.7 5.7* 3.8*   ALBUMIN 3.8 2.8* 2.2*   BILITOT 0.9 1.2* 0.6   ALKPHOS 97 62 37*   AST 63* 35 42*   ALT 29 20 27   ANIONGAP 23* 17* 13   EGFRNONAA 18* 29* 43*     Coagulation:   Recent Labs  Lab 10/09/17  1711   INR 1.0   APTT 22.4     Lactic Acid:   Recent Labs  Lab 10/09/17  2045 10/10/17  0320 10/10/17  0740   LACTATE 8.1* 11.2* 7.9*     POCT Glucose:   Recent Labs  Lab 10/10/17  0811 10/10/17  0848 10/10/17  1038   POCTGLUCOSE 227* 220* 191*     Troponin:   Recent Labs  Lab 10/10/17  0740   TROPONINI 0.154*       Significant Imaging: CXR: I have reviewed all pertinent results/findings within the past 24 hours and my personal findings are:  ETT in appropriate position, no consolidation

## 2017-10-10 NOTE — ASSESSMENT & PLAN NOTE
Overtreated with low hemoglobin A1c.  Takes metformin, glimepiride, and sitagliptin.  Metformin is straightforward contraindicated.  According to Ms. Conrad's daughter, Dr. Armando has not told her to stop taking it.  Sliding scale insulin aspart and monitor serum glucose.

## 2017-10-10 NOTE — CHAPLAIN
"Krystin in  alerted me of family receiving bad news about patient, "nothing can be done" for patient. Family members are gathering and Fr Dunlap was called.  When  arrived he anointed patient and offered spiritual care and education to the family at bedside.  I brought ice water to family and explored their needs.    "

## 2017-10-10 NOTE — ANESTHESIA POSTPROCEDURE EVALUATION
Anesthesia Post Evaluation    Patient: Neha Conrad    Procedure(s) Performed: arterial line placement and oral intubation    Final Anesthesia Type: general  Patient location during evaluation: ICU  Patient participation: No - Unable to Participate, Intubation  Post-procedure vital signs: reviewed and stable  Pain management: adequate  Airway patency: patent  PONV status at discharge: No PONV  Anesthetic complications: no      Cardiovascular status: blood pressure returned to baseline  Respiratory status: intubated  Hydration status: euvolemic  Follow-up not needed.        Visit Vitals  BP (!) 84/49   Pulse (!) 113   Temp 36.4 °C (97.5 °F) (Oral)   Resp (!) 28   Ht 5' (1.524 m)   Wt 59.9 kg (132 lb 0.9 oz)   SpO2 100%   Breastfeeding? No   BMI 25.79 kg/m²       Pain/Raman Score: Pain Assessment Performed: Yes (10/10/2017  5:10 AM)  Presence of Pain: denies (10/10/2017  5:10 AM)  Pain Rating Prior to Med Admin: 10 (10/10/2017  3:47 AM)  Pain Rating Post Med Admin: 5 (10/10/2017  4:17 AM)

## 2017-10-10 NOTE — PROGRESS NOTES
[facility] Medicine  Progress Note    Patient Name: Neha Conrad  MRN: [id number]  Patient Class: IP- Inpatient   Admission Date: 10/9/2017  Length of Stay: 1 days  Attending Physician: Robin Thacker MD  Primary Care Provider: Saeed Armando MD        Subjective:     Principal Problem:Ischemic colitis    HPI:  Neha Conrad is a 66 year white woman with type 2 diabetes mellitus, hypertension, hyperlipidemia, cigarette smoking, coronary artery disease status post coronary artery bypass graft in 2005, aortic atherosclerosis with infrarenal stenosis, carotid artery disease status post carotid endarterectomy, history of stroke, cirrhosis due to nonalcoholic hepatosteatosis with portal hypertension (diagnosed at [facility] in 2015), gastroesophageal reflux disease, history of ventral hernia repair with mesh in June 2017, and history of iron overload on 7/25/17.  She has chronic, intermittent abdominal pan.  She lives at home with her daughters Cathy and Jing.  Her primary care physician is Dr. Saeed Armando.  Her gastroenterologist is Dr. Jam Wilkerson.               She was seen in the ED on 7/9/17 for worsening abdominal pain and started on dicyclomine and she was also treated with a 7 day course of ciprofloxacin for a urinary tract infection.  She was admitted at [facility] 7/21/17 - 7/23/2017 for nausea, vomiting and diarrhea with positive C. diff antigen and toxin (7/22/2017).  She was started on metronidazole and was feeling well on the day of discharge.  She was hospitalized again 7/25/17-7/27/17 after continuing to have abdominal cramping and vomiting, and was found to have decreased blood counts, an initial blood pressure of 99/53, acute kidney injury (creatinine 1.2), and lactic acidosis (3.4) despite clearance of C. Difficile toxin.  Upper GI endoscopy showed mild gastritis.  Lactate only improved to 2.8 by discharge.               She presented to [facility] ED on 10/9/17 after nausea, vomiting, diarrhea, hematochezia, and lastly right lower quadrant abdominal pain, beginning when she awoke in the morning.  Like her last hospitalization, her initial blood pressure was again low, but lower at 67/43, she again had acute kidney injury, but worse, with BUN 60 and creatinine 2.7, and lactate was again elevated, but more so than before, at 7.8.  Her right lower quadrant was tender.  With her significant atherosclerosis, ED Dr. Jam Resendiz was concerned for ischemic colitis, which would explain her chronic intermittent abdominal pain.  She was given 2 liters of IV saline with improvement of blood pressures.  She takes lisinopril daily but no longer takes furosemide.   She also takes metformin, which is contraindicated in liver disease due to risk of lactic acidosis.  She was admitted to [facility] with plan for packed RBC transfusion pending non-contrast CT results.  The CT results confirmed Dr. Resendiz's suspicions, additionally showing ischemic enteritis.      Hospital Course:  Taken to the OR for emergent exploratory the morning of 10/10/17 which found severe ischemia of entire small intestine and right colon. She was then closed and discussion with the family regarding the poor prognosis was had by Dr. Monge.     Interval History: No operative intervention because of extent of ischemia. Not breathing over the vent.     Review of Systems   Unable to perform ROS: Intubated     Objective:     Vital Signs (Most Recent):  Temp: 98.2 °F (36.8 °C) (10/10/17 1100)  Pulse: (!) 113 (10/10/17 0702)  Resp: (!) 30 (10/10/17 1100)  BP: (!) 84/49 (10/10/17 0649)  SpO2: 100 % (10/10/17 0702) Vital Signs (24h Range):  Temp:  [97 °F (36.1 °C)-98.2 °F (36.8 °C)] 98.2 °F (36.8 °C)  Pulse:  [] 113  Resp:  [6-68] 30  SpO2:  [79 %-100 %] 100 %  BP: ()/(43-76) 84/49     Weight: 59.9 kg (132 lb 0.9 oz)  Body mass index  is 25.79 kg/m².    Intake/Output Summary (Last 24 hours) at 10/10/17 1129  Last data filed at 10/10/17 1024   Gross per 24 hour   Intake          3581.23 ml   Output             1760 ml   Net          1821.23 ml      Physical Exam   Constitutional: She appears well-developed. No distress. She is intubated.   HENT:   Head: Normocephalic and atraumatic.   Eyes: Conjunctivae are normal. No scleral icterus.   Cardiovascular: Normal rate and regular rhythm.    Murmur heard.  Pulmonary/Chest: Effort normal and breath sounds normal. She is intubated. No respiratory distress. She has no wheezes.   Abdominal: Soft. She exhibits distension. There is tenderness.   Musculoskeletal: She exhibits no edema or tenderness.   Neurological: She is unresponsive.   Skin: No erythema. There is pallor.   Nursing note and vitals reviewed.      Significant Labs:   ABGs:   Recent Labs  Lab 10/10/17  0704   PH 7.086*   PCO2 36.1   HCO3 10.8*   POCSATURATED 100   BE -19     CBC:   Recent Labs  Lab 10/09/17  1711 10/10/17  0320 10/10/17  1003 10/10/17  1051   WBC 34.62* 13.58*  --  9.07   HGB 9.5* 9.1* 3.9* 3.9*   HCT 28.9* 29.9* 12.0* 12.4*    201  --  127*     CMP:   Recent Labs  Lab 10/09/17  1711 10/10/17  0320 10/10/17  1003   * 136 149*   K 3.7 4.3 3.5   CL 96 108 113*   CO2 15* 11* 23   * 351* 98   BUN 60* 50* 44*   CREATININE 2.7* 1.8* 1.3   CALCIUM 10.1 8.1* 6.6*   PROT 7.7 5.7* 3.8*   ALBUMIN 3.8 2.8* 2.2*   BILITOT 0.9 1.2* 0.6   ALKPHOS 97 62 37*   AST 63* 35 42*   ALT 29 20 27   ANIONGAP 23* 17* 13   EGFRNONAA 18* 29* 43*     Coagulation:   Recent Labs  Lab 10/09/17  1711   INR 1.0   APTT 22.4     Lactic Acid:   Recent Labs  Lab 10/09/17  2045 10/10/17  0320 10/10/17  0740   LACTATE 8.1* 11.2* 7.9*     POCT Glucose:   Recent Labs  Lab 10/10/17  0811 10/10/17  0848 10/10/17  1038   POCTGLUCOSE 227* 220* 191*     Troponin:   Recent Labs  Lab 10/10/17  0740   TROPONINI 0.154*       Significant Imaging: CXR: I have  reviewed all pertinent results/findings within the past 24 hours and my personal findings are:  ETT in appropriate position, no consolidation    Assessment/Plan:      * Ischemic colitis    Ischemic enteritis  Hematochezia  Acute kidney injury  Chronic abdominal pain  Aortic atherosclerosis  Stenosis of infrarenal abdominal aorta due to atherosclerosis  Extensive ischemia on ex-lap per Dr. Martino. No operative intervention available. Lactic acid remains elevated. Continue antibiotics and pressors. Cdiff PCR is negative. Continue IV fluids. Discussed the grave situation with the daughter and son. Will continue current treatment, but will revisit withdrawal. Will be DNR with no escalation.        Acute blood loss anemia    Hematochezia  Hgb down to 3.9. Will transfuse at least 1 unit of blood now. Reassess with family later.         Liver cirrhosis secondary to DOSS    Portal hypertension  Stable.          History of stroke    Hold aspirin, clopidrogel, statin.          Essential hypertension    Holding home lisinopril 40 mg daily as on pressors          Hypertriglyceridemia    Takes atorvastatin, which is risky in cirrhosis.          Type 2 diabetes mellitus without complication, without long-term current use of insulin    Sliding scale insulin aspart and monitor serum glucose.          Coronary artery disease involving nonautologous biological coronary bypass graft without angina pectoris    S/P CABG (coronary artery bypass graft)  Hold aspirin and clopidrogel.            VTE Risk Mitigation         Ordered     Medium Risk of VTE  Once      10/09/17 2149     Place BETH hose  Until discontinued      10/09/17 2149          Critical care time spent on the evaluation and treatment of severe organ dysfunction, review of pertinent labs and imaging studies, discussions with consulting providers and discussions with patient/family: 45 minutes.    Robin Thacker MD  Department of Hospital Medicine   [facility]

## 2017-10-10 NOTE — ANESTHESIA PROCEDURE NOTES
Arterial    Diagnosis: sepsis  Doctor requesting consult: matt    Patient location during procedure: ICU  Procedure start time: 10/10/2017 6:52 AM  Timeout: 10/10/2017 6:52 AM  Procedure end time: 10/10/2017 6:55 AM  Staffing  Anesthesiologist: LUDIN HOUSE  Performed: anesthesiologist   Anesthesiologist was present at the time of the procedure.  Preanesthetic Checklist  Completed: patient identified, site marked, surgical consent, pre-op evaluation, timeout performed, IV checked, risks and benefits discussed, monitors and equipment checked and anesthesia consent givenArterial  Skin Prep: chlorhexidine gluconate  Orientation: left  Location: radial  Catheter Size: 20 G  Catheter placement by Ultrasound guidance. Heme positive aspiration all ports.  Vessel Caliber: small, patent  Needle advanced into vessel with real time Ultrasound guidance.Insertion Attempts: 1  Assessment  Dressing: secured with tape and tegaderm  Patient: Tolerated well

## 2017-10-10 NOTE — ED NOTES
Per Dr. Resendiz in ED, hold blood transfusion and start insulin first. Will wait for further instruction. Pt VSS.

## 2017-10-10 NOTE — ASSESSMENT & PLAN NOTE
Ischemic enteritis  Hematochezia  Acute kidney injury  Chronic abdominal pain  Aortic atherosclerosis  Stenosis of infrarenal abdominal aorta due to atherosclerosis  Extensive ischemia on ex-lap per Dr. Martino. No operative intervention available. Lactic acid remains elevated. Continue antibiotics and pressors. Cdiff PCR is negative. Continue IV fluids. Discussed the grave situation with the daughter and son. Will continue current treatment, but will revisit withdrawal. Will be DNR with no escalation.

## 2017-10-10 NOTE — ASSESSMENT & PLAN NOTE
Hematochezia  Hgb down to 3.9. Will transfuse at least 1 unit of blood now. Reassess with family later.

## 2017-10-10 NOTE — PROGRESS NOTES
Received call from nursing staff regarding patient's respiratory rate 40s-50s, tachycardia 120s-130s, and hypotension.  Patient appears to be decompensating.  Spoke with General Surgeon Dr. Ruff regarding patient's current status.  Patient will be evaluated this morning.    ROMARIO Espinosa  [facility]  [phone] pager  [phone] spectra link  [phone] office

## 2017-10-10 NOTE — ANESTHESIA PREPROCEDURE EVALUATION
10/10/2017  Neha Conrad is a 66 y.o., female undergoing exploratory laparotomy for ischemic bowel. Past medical history: CAD, Dm, GERD, liver cirrhosis. Intubated in ICU and arterial line placed this morning.    Anesthesia Evaluation    I have reviewed the Patient Summary Reports.     I have reviewed the Nursing Notes.   I have reviewed the Medications.     Review of Systems  Anesthesia Hx:  Denies Hx of Anesthetic complications  History of prior surgery of interest to airway management or planning: Denies Family Hx of Anesthesia complications.   Denies Personal Hx of Anesthesia complications.   Social:  No Alcohol Use, Smoker    Hematology/Oncology:         -- Anemia:   Cardiovascular:   Exercise tolerance: good Hypertension Past MI CAD   hyperlipidemia    Pulmonary:  Pulmonary Normal    Renal/:   Chronic Renal Disease    Hepatic/GI:   GERD Liver Disease, (Cirrhosis) -Ventral Hernia  -C. Difficile with diarrhea  -intermittent nausea  -last emesis 5 days ago   Neurological:   CVA (1999) short-term memory impairment    Endocrine:   Diabetes Hypothyroidism    Psych:   Psychiatric History anxiety depression          Physical Exam  General:  Well nourished    Airway/Jaw/Neck:  Airway Findings: Pre-Existing Airway Tube(s): Oral Endotracheal tube    Eyes/Ears/Nose:  Eyes/Ears/Nose Findings:     Chest/Lungs:  Chest/Lungs Findings: Clear to auscultation     Heart/Vascular:  Heart Findings: Rate: Normal  Rhythm: Regular Rhythm        Mental Status:  Mental Status Findings:         Anesthesia Plan  Type of Anesthesia, risks & benefits discussed:  Anesthesia Type:  general  Patient's Preference:   Intra-op Monitoring Plan: central line and standard ASA monitors  Intra-op Monitoring Plan Comments:   Post Op Pain Control Plan:   Post Op Pain Control Plan Comments:   Induction:   IV  Beta Blocker:  Patient is on a  Beta-Blocker and has received one dose within the past 24 hours (No further documentation required).       Informed Consent: Patient understands risks and agrees with Anesthesia plan.  Questions answered.   ASA Score: 4  emergent   Day of Surgery Review of History & Physical: I have interviewed and examined the patient. I have reviewed the patient's H&P dated:  There are no significant changes. Significant changes noted: Surgeon notified.      Anesthesia Plan Notes: Will likely need central line placed intraoperatively        Ready For Surgery From Anesthesia Perspective.

## 2017-10-10 NOTE — PLAN OF CARE
GRICEL Sharma notified of the high lactic acid of 8.1. No new orders given.  In[facility]

## 2017-10-10 NOTE — SUBJECTIVE & OBJECTIVE
"Past Medical History:   Diagnosis Date    Anemia     Calcification of aorta     Cirrhosis of liver     Coronary artery disease     Diabetes mellitus     GERD (gastroesophageal reflux disease)     Hyperlipidemia     Hypertension     Renal disorder     Thyroid disease     Ventral hernia 05/08/2017    on CT, referred to Dr Gaytan       Past Surgical History:   Procedure Laterality Date    APPENDECTOMY      CARDIAC SURGERY      CAROTID ENDARTERECTOMY      Wolf    CHOLECYSTECTOMY      CORONARY ARTERY BYPASS GRAFT      2005    HYSTERECTOMY      SHOULDER SURGERY Right 2017    TONSILLECTOMY      ULNAR NERVE TRANSPOSITION Right 03/2016    VENTRAL HERNIA REPAIR         Review of patient's allergies indicates:   Allergen Reactions    Farxiga [dapagliflozin] Anaphylaxis     Couldn't void    Bentyl [dicyclomine] Other (See Comments)     "dazed"     Chantix [varenicline]     Clindamycin     Itraconazole        No current facility-administered medications on file prior to encounter.      Current Outpatient Prescriptions on File Prior to Encounter   Medication Sig    alendronate (FOSAMAX) 70 MG tablet TAKE 1 TABLET BY MOUTH EVERY 7 DAYS (Patient taking differently: TAKE 1 TABLET BY MOUTH EVERY 7 DAYS on Sundays)    alprazolam (XANAX) 0.5 MG tablet TAKE 1 TABLET BY MOUTH TWICE DAILY AS NEEDED FOR ANXIETY    aspirin 81 MG Chew Take 81 mg by mouth once daily.    Bifidobacterium infantis (ALIGN) 4 mg Cap Take by mouth.    blood sugar diagnostic (CONTOUR TEST STRIPS) Strp Apply 1 each topically 3 (three) times daily.    calcium-vitamin D3 500 mg(1,250mg) -200 unit per tablet Take 1 tablet by mouth every evening.     clopidogrel (PLAVIX) 75 mg tablet Take 1 tablet (75 mg total) by mouth once daily.    cyanocobalamin, vitamin B-12, (VITAMIN B-12) 1,000 mcg Subl Place 1,000 mcg under the tongue once daily.    fish oil-omega-3 fatty acids 300-1,000 mg capsule Take 2 g by mouth 2 (two) times daily.    " glimepiride (AMARYL) 4 MG tablet Take 1 tablet (4 mg total) by mouth before breakfast. For diabetes    lancets (ACCU-CHEK SOFTCLIX LANCETS) Misc 1 Units by Misc.(Non-Drug; Combo Route) route 3 (three) times daily before meals.    levothyroxine (SYNTHROID) 88 MCG tablet Take 1 tablet (88 mcg total) by mouth once daily.    lisinopril (PRINIVIL,ZESTRIL) 40 MG tablet One tab twice a day    magnesium oxide (MAG-OX) 400 mg tablet Take 1 tablet (400 mg total) by mouth 2 (two) times daily.    metformin (GLUCOPHAGE) 500 MG tablet Take 2 tablets (1,000 mg total) by mouth 2 (two) times daily.    metoprolol tartrate (LOPRESSOR) 25 MG tablet TAKE 1 TABLET BY MOUTH TWICE DAILY    MICROLET LANCET Misc 3 (three) times daily.     olanzapine (ZYPREXA) 10 MG tablet TAKE 1/2 TABLET BY MOUTH EVERY EVENING    pantoprazole (PROTONIX) 40 MG tablet Take 1 tablet (40 mg total) by mouth once daily.    simvastatin (ZOCOR) 40 MG tablet TAKE 1 TABLET BY MOUTH EVERY DAY    SITagliptin (JANUVIA) 100 MG Tab Take 1 tablet (100 mg total) by mouth once daily.    venlafaxine (EFFEXOR-XR) 150 MG Cp24 Take 1 capsule (150 mg total) by mouth once daily.    [DISCONTINUED] furosemide (LASIX) 40 MG tablet TAKE 1 TABLET BY MOUTH ONCE DAILY     Family History     Problem Relation (Age of Onset)    Cancer Brother    Diabetes Father    Heart disease Mother    Pulmonary embolism Mother        Social History Main Topics    Smoking status: Current Every Day Smoker     Packs/day: 0.50     Years: 0.50    Smokeless tobacco: Never Used    Alcohol use No    Drug use: No    Sexual activity: Not on file     Review of Systems   Constitutional: Negative for chills and fever.   HENT: Negative for trouble swallowing and voice change.    Eyes: Negative for pain and redness.   Respiratory: Negative for cough and shortness of breath.    Cardiovascular: Negative for chest pain and leg swelling.   Gastrointestinal: Positive for abdominal pain, blood in stool,  diarrhea, nausea and vomiting.   Genitourinary: Negative for difficulty urinating and dysuria.   Musculoskeletal: Negative for neck pain and neck stiffness.   Skin: Negative for rash and wound.   Neurological: Negative for seizures and syncope.     Objective:     Vital Signs (Most Recent):  Temp: 97.6 °F (36.4 °C) (10/09/17 1628)  Pulse: 91 (10/09/17 1820)  Resp: 18 (10/09/17 1820)  BP: (!) 177/76 (10/09/17 1820)  SpO2: 99 % (10/09/17 1820) Vital Signs (24h Range):  Temp:  [97.6 °F (36.4 °C)] 97.6 °F (36.4 °C)  Pulse:  [84-94] 91  Resp:  [18-22] 18  SpO2:  [95 %-99 %] 99 %  BP: ()/(43-76) 177/76     Weight: 60.8 kg (134 lb)  Body mass index is 26.17 kg/m².    Physical Exam   Constitutional: She is oriented to person, place, and time. She appears well-developed. No distress.   HENT:   Head: Normocephalic and atraumatic.   Eyes: Conjunctivae are normal. Pupils are equal, round, and reactive to light.   Neck: Neck supple. No tracheal deviation present.   Cardiovascular: Normal rate and regular rhythm.    Pulmonary/Chest: Effort normal and breath sounds normal. No respiratory distress.   Abdominal: Soft. There is tenderness. There is no rebound and no guarding.   Musculoskeletal: She exhibits no edema or tenderness.   Neurological: She is alert and oriented to person, place, and time.   Psychiatric: She has a normal mood and affect.   Nursing note and vitals reviewed.       Significant Labs: All pertinent labs within the past 24 hours have been reviewed.    Significant Imaging: I have reviewed all pertinent imaging results/findings within the past 24 hours.   CT Abdomen Pelvis Without Contrast 10/09/17:   Findings:      The lung bases are within normal limits.  The heart is unremarkable.  There are extensive calcifications of the abdominal aorta and branch vessels.  There is diminutive appearance of the distal infrarenal abdominal yolk lesion.  Similar findings were present on the prior examination.  Extensive  eccentric calcifications also noted along the course of the abdominal aorta.  There also extensive calcification of the branch vessels of the abdominal aorta.  There are subcentimeter lymph nodes in the retroperitoneal lymph node stations.  Scattered lymph nodes are also noted in the mesentery.  The esophagus is unremarkable.  There is mild distention of the stomach.  The duodenum is within normal limits.  There is wall thickening and fluid distention of the small bowel loops.  No definitive transition point is identified.  The appendix is not visualized.  There are no secondary findings of acute appendicitis.  There is wall thickening of the transverse colon.  There is also wall thickening involving the rectosigmoid colon.  No definite evidence of obstruction is identified.  There is nodular contour of the liver.  The gallbladder is poorly visualized and may be surgically absent.  There is stable prominence of the common bile duct.  There are multiple splenules.  There is extensive vascularity in the left upper abdomen.  The pancreas is unremarkable.  The adrenal glands are within normal limits.  There is atrophic appearance of both kidneys.  There are subcentimeter nonobstructing stones in both kidneys.  The urinary bladder and the ureters are unremarkable.  The patient is status post hysterectomy.  There is no evidence of free fluid in the abdomen or pelvis.  There is no evidence of free air.  No definitive evidence of pneumatosis, allowing for multiple fluid distention of the small bowel loops.  The psoas margins are unremarkable.  There are postoperative changes in the anterior abdominal wall.  The abdominal is otherwise unremarkable.  There are degenerative changes in the osseous structures.  There is a stable anterior compression deformity of the T12 vertebral body.  Impression:  Wall thickening involving the transverse colon and also the rectosigmoid colon.  The findings are suggestive of colitis  (ischemic/inflammatory/infectious). Given the extensive atherosclerotic disease and the near occlusion of the infrarenal abdominal aorta,  ischemic colitis is favored.  Suggest clinical correlation. Contrast-enhanced examination may be obtained for further evaluation.  Fluid distention and wall thickening the small bowel loops.  No definitive evidence of pneumatosis.  The findings represent superimposed ischemic enteritis.  Nodular contour of the liver with extensive variceal vessels in the upper abdomen.  The findings represent portal hypertension in the setting of cirrhosis.    Additional findings as above.

## 2017-10-10 NOTE — NURSING
Pt to OR via bed and per anesthesia staff. Travels with portable O2/monitor and is ventilated via Ambu bag by SRNA/CRNA. Staff anesthesia MDA at the bedside. Pt also remains on Levophed, Insulin and 0.9% NS drips upon transfer.

## 2017-10-10 NOTE — TRANSFER OF CARE
Anesthesia Transfer of Care Note    Patient: Neha Conrad    Procedure(s) Performed: Procedure(s) (LRB):  EXPLORATORY-LAPAROTOMY (N/A)    Patient location: ICU    Anesthesia Type: general    Transport from OR: Upon arrival to PACU/ICU, patient attached to ventilator and auscultated to confirm bilateral breath sounds and adequate TV. Transported from OR intubated on 100% O2 by AMBU with adequate controlled ventilation. Continuous ECG monitoring in transport. Continuous SpO2 monitoring in transport. Continuos invasive BP monitoring in transport. Continuous CVP monitoring in transport    Post pain: adequate analgesia    Post assessment: no apparent anesthetic complications    Post vital signs: unstable    Level of consciousness: sedated    Nausea/Vomiting: no nausea/vomiting    Complications: none    Transfer of care protocol was followed      Last vitals:   Visit Vitals  BP (!) 84/49   Pulse (!) 113   Temp 36.4 °C (97.5 °F) (Oral)   Resp (!) 28   Ht 5' (1.524 m)   Wt 59.9 kg (132 lb 0.9 oz)   SpO2 100%   Breastfeeding? No   BMI 25.79 kg/m²

## 2017-10-10 NOTE — PLAN OF CARE
Notified eICU of patient's RR in 40-50s; pt still tachycardic in 120s-130s and mildly hypotensive. Pt c/o abdominal pain. Lactic acid 11. Dr. Luque ordered ABG and chest xray. Updated VANESSA Ramirez NP on patient status. NP to call surgery team.  Lily Sauceda RN

## 2017-10-10 NOTE — NURSING
Patient transferd from the ER, AAO, confused at times, Vanomycin infusing upon arrival, on insulin gtt at 3 units/hr, c/o abdominal pain, no c/o nausea /vomiting, call light within reach, instructed to call for assist.    Confirmed from the lab that only one set of blood cultures were drawn.

## 2017-10-10 NOTE — ANESTHESIA PROCEDURE NOTES
Central Line    Diagnosis: ischemic bowel  Patient location during procedure: done in OR  Procedure start time: 10/10/2017 9:01 AM  Timeout: 10/10/2017 9:00 AM  Procedure end time: 10/10/2017 9:13 AM  Staffing  Anesthesiologist: SHYANNE AYON  Performed: anesthesiologist   Anesthesiologist was present at the time of the procedure.  Preanesthetic Checklist  Completed: patient identified, site marked, surgical consent, pre-op evaluation, timeout performed, IV checked, risks and benefits discussed, monitors and equipment checked and anesthesia consent given  Indication  Indication: med administration, hemodynamic monitoring, vascular access     Anesthesia   general anesthesia    Central Line  Skin Prep: skin prepped with ChloraPrep, skin prep agent completely dried prior to procedure  maximum sterile barriers used during central venous catheter insertion  hand hygiene performed prior to central venous catheter insertion  Location: right internal jugular,   Catheter type: triple lumen  Inserted Catheter Length: 14 cm  Ultrasound: vascular probe with ultrasound  Vessel Caliber: small, patent  Vascular Doppler:  not done, compressibility normal  Needle advanced into vessel with real time Ultrasound guidance.  Guidewire confirmed in vessel.  Sterile sheath used.  Manometry: none  Insertion Attempts: 1   Securement:line sutured, chlorhexidine patch, sterile dressing applied and blood return through all ports     Post-Procedure  Post-procedure assessment: xray in ICU.  Adverse Events:none

## 2017-10-10 NOTE — PLAN OF CARE
Change in respiratory status noted, tachyphenic, respiratory rate between 40-50's, tachycardic 120-130's, EICU and Zachary notified by charge nurse Lily. Dr. Luque ordered ABG and chest X-ray, NP will call surgery.

## 2017-10-10 NOTE — OP NOTE
Date of Procedure:  10/10/2017    Procedure: Procedure(s) (LRB):  Exploratory laparotomy    Surgeon(s) and Role:  * Sanyt Ruff MD - Primary    Assisting Surgeon: Anabela Martino MD    Pre-Operative Diagnosis: Acute Mesenteric ischemia    Post-Operative Diagnosis: Acute Mesenteric ischemia    Anesthesia: General      INDICATIONS: The patient is a 66 y.o. female  who presents to the emergency room with clinical and radiologic evidence of mesenteric ischemia. She presented with abdominal pain and bloody stool. Overnight her condition worsened. Her acidosis and mental status began to deteriorate. The patient was counseled on his options for treatment and desired to proceed with surgical intervention. The risks of the procedure were described to the patient including bleeding, infection, pain, scarring, wound complications, injury to local structures, and potential need for further surgery. The patient demonstrated understanding of these risks and a consent   form was obtained.    PROCEDURE: The patient was identified in the Preoperative Unit and taken back   to the Operating Room and laid supine on the operating room table. IV   antibiotics were administered prior to the induction of general anesthesia.   General anesthesia was induced without complication. The patient was then   prepped and draped in standard sterile fashion manner. A timeout procedure was   performed in accordance of hospital protocol.   A lower midline incision was made and cautery was used to dissect down to the fascia. This was then incised using cautery. The abdomen was then entered. There was a large amount of serosanguinous fluid noted within the abdomen. The small bowel was obviously ischemic and bluish/purple thoughout most of its course. The ligament of treitz was identified. About 15cm of proximal jejunum appeared grossly healthy but distal to that the entire remainder of small intestine was found to ischemic. The cecum and ascending  colon was splotchy with areas of ischemia as well. Most of the descending colon and sigmoid appeared normal. The stomach was normal in appearance. There was not succus or evidence of perforation noted. Novadaq was used to evaluate the bowel and confirmed lack of blood flow to large portions of the small intestine.  Due to the fact that we would have to resect almost the entire GI tract and this would not be survivable, the decision was made to close the abdomen and discuss with the family the severity of her case.  The fascia was closed using a loop PDS in a running fashion. Skin was closed using staples.     Instrument, sponge, and needle counts were correct at the conclusion of the case.       Complications: None; patient tolerated the procedure well.    Estimated Blood Loss (EBL): 10mL    Drains: none    Implants: none    Specimens: None    Condition: stable    Disposition: ICU - hemodynamically stable.    Attestation: I was present and scrubbed for the entire procedure.

## 2017-10-10 NOTE — PROGRESS NOTES
10/10/17 1045   Restraint Q2H Monitoring w/Assessment for Injury   Visual Check SD   Circulation NS   Range of Motion A   Fluids IV   Food/Meal N   Elimination UC   Pain Rating: Rest 0   Observed Emotional State calm   Comfort Measures Repositioned   Privacy Maintained Yes   Vital Signs per MD order Yes   Assessed readiness for DC Yes   Restraint Injuries - Monitor Q2H   Injuries Noted  None   Restraint Type (NV)   Soft Restraint R Wrist (NV) Discontinued   Soft Restraint L Wrist (NV) Discontinued   Debriefing   Debriefing Debriefing occurred (see note)   Behavior Requiring Intervention Resolved     Pt no longer requiring restraints at this time. Calm and no s/s of distress noted. Pt unarousable at present.

## 2017-10-10 NOTE — PLAN OF CARE
Pt in respiratory distress with POX trending down to 80's, blood pressure dropped down to 70/40's, heart rate trending down, charge nurse Lily notified and at bedside.  Lily called anesthesiologist for emergent intubation, GRICEL Sharma notified, surgery team at bedside, respiratory at bedside.  Pt intubated, A line initiated, on Levo,on insulin gtt, report given to SINA Tyson.

## 2017-10-10 NOTE — H&P
[facility]  History & Physical    Patient Name: eNha Conrad  MRN: [id number]  Admission Date: 10/9/2017  Attending Physician: Luis Akins MD  Primary Care Provider: Saeed Armando MD         Patient information was obtained from patient, relative(s), past medical records and ER records.     Subjective:     Principal Problem:Ischemic colitis    Chief Complaint:   Chief Complaint   Patient presents with    Diarrhea     c/o bloody diarrhea, nausea, vomiting, and weakness since this morning. 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 in 2015), gastroesophageal reflux disease, history of ventral hernia repair with mesh in June 2017, and history of iron overload on 7/25/17.  She has chronic, intermittent abdominal pan.  She lives at home with her daughters Cathy and Jing.  Her primary care physician is Dr. Saeed Armando.  Her gastroenterologist is Dr. Jam Wilkerson.               She was seen in the ED on 7/9/17 for worsening abdominal pain and started on dicyclomine and she was also treated with a 7 day course of ciprofloxacin for a urinary tract infection.  She was admitted at [facility] 7/21/17 - 7/23/2017 for nausea, vomiting and diarrhea with positive C. diff antigen and toxin (7/22/2017).  She was started on metronidazole and was feeling well on the day of discharge.  She was hospitalized again 7/25/17-7/27/17 after continuing to have abdominal cramping and vomiting, and was found to have decreased  blood counts, an initial blood pressure of 99/53, acute kidney injury (creatinine 1.2), and lactic acidosis (3.4) despite clearance of C. Difficile toxin.  Upper GI endoscopy showed mild gastritis.  Lactate only improved to 2.8 by discharge.              She presented to [facility] ED on 10/9/17 after nausea, vomiting, diarrhea, hematochezia, and lastly right lower quadrant abdominal pain, beginning when she awoke in the morning.  Like her last hospitalization, her initial blood pressure was again low, but lower at 67/43, she again had acute kidney injury, but worse, with BUN 60 and creatinine 2.7, and lactate was again elevated, but more so than before, at 7.8.  Her right lower quadrant was tender.  With her significant atherosclerosis, ED Dr. Jam Resendiz was concerned for ischemic colitis, which would explain her chronic intermittent abdominal pain.  She was given 2 liters of IV saline with improvement of blood pressures.  She takes lisinopril daily but no longer takes furosemide.   She also takes metformin, which is contraindicated in liver disease due to risk of lactic acidosis.  She was admitted to [facility] with plan for packed RBC transfusion pending non-contrast CT results.  The CT results confirmed Dr. Resenidz's suspicions, additionally showing ischemic enteritis.      Past Medical History:   Diagnosis Date    Anemia     Calcification of aorta     Cirrhosis of liver     Coronary artery disease     Diabetes mellitus     GERD (gastroesophageal reflux disease)     Hyperlipidemia     Hypertension     Renal disorder     Thyroid disease     Ventral hernia 05/08/2017    on CT, referred to Dr Gaytan       Past Surgical History:   Procedure Laterality Date    APPENDECTOMY      CARDIAC SURGERY      CAROTID ENDARTERECTOMY      Wolf    CHOLECYSTECTOMY      CORONARY ARTERY BYPASS GRAFT      2005    HYSTERECTOMY      SHOULDER SURGERY Right 2017    TONSILLECTOMY    "   ULNAR NERVE TRANSPOSITION Right 03/2016    VENTRAL HERNIA REPAIR         Review of patient's allergies indicates:   Allergen Reactions    Farxiga [dapagliflozin] Anaphylaxis     Couldn't void    Bentyl [dicyclomine] Other (See Comments)     "dazed"     Chantix [varenicline]     Clindamycin     Itraconazole        No current facility-administered medications on file prior to encounter.      Current Outpatient Prescriptions on File Prior to Encounter   Medication Sig    alendronate (FOSAMAX) 70 MG tablet TAKE 1 TABLET BY MOUTH EVERY 7 DAYS (Patient taking differently: TAKE 1 TABLET BY MOUTH EVERY 7 DAYS on Sundays)    alprazolam (XANAX) 0.5 MG tablet TAKE 1 TABLET BY MOUTH TWICE DAILY AS NEEDED FOR ANXIETY    aspirin 81 MG Chew Take 81 mg by mouth once daily.    Bifidobacterium infantis (ALIGN) 4 mg Cap Take by mouth.    blood sugar diagnostic (CONTOUR TEST STRIPS) Strp Apply 1 each topically 3 (three) times daily.    calcium-vitamin D3 500 mg(1,250mg) -200 unit per tablet Take 1 tablet by mouth every evening.     clopidogrel (PLAVIX) 75 mg tablet Take 1 tablet (75 mg total) by mouth once daily.    cyanocobalamin, vitamin B-12, (VITAMIN B-12) 1,000 mcg Subl Place 1,000 mcg under the tongue once daily.    fish oil-omega-3 fatty acids 300-1,000 mg capsule Take 2 g by mouth 2 (two) times daily.    glimepiride (AMARYL) 4 MG tablet Take 1 tablet (4 mg total) by mouth before breakfast. For diabetes    lancets (ACCU-CHEK SOFTCLIX LANCETS) Misc 1 Units by Misc.(Non-Drug; Combo Route) route 3 (three) times daily before meals.    levothyroxine (SYNTHROID) 88 MCG tablet Take 1 tablet (88 mcg total) by mouth once daily.    lisinopril (PRINIVIL,ZESTRIL) 40 MG tablet One tab twice a day    magnesium oxide (MAG-OX) 400 mg tablet Take 1 tablet (400 mg total) by mouth 2 (two) times daily.    metformin (GLUCOPHAGE) 500 MG tablet Take 2 tablets (1,000 mg total) by mouth 2 (two) times daily.    metoprolol " tartrate (LOPRESSOR) 25 MG tablet TAKE 1 TABLET BY MOUTH TWICE DAILY    MICROLET LANCET Misc 3 (three) times daily.     olanzapine (ZYPREXA) 10 MG tablet TAKE 1/2 TABLET BY MOUTH EVERY EVENING    pantoprazole (PROTONIX) 40 MG tablet Take 1 tablet (40 mg total) by mouth once daily.    simvastatin (ZOCOR) 40 MG tablet TAKE 1 TABLET BY MOUTH EVERY DAY    SITagliptin (JANUVIA) 100 MG Tab Take 1 tablet (100 mg total) by mouth once daily.    venlafaxine (EFFEXOR-XR) 150 MG Cp24 Take 1 capsule (150 mg total) by mouth once daily.    [DISCONTINUED] furosemide (LASIX) 40 MG tablet TAKE 1 TABLET BY MOUTH ONCE DAILY     Family History     Problem Relation (Age of Onset)    Cancer Brother    Diabetes Father    Heart disease Mother    Pulmonary embolism Mother        Social History Main Topics    Smoking status: Current Every Day Smoker     Packs/day: 0.50     Years: 0.50    Smokeless tobacco: Never Used    Alcohol use No    Drug use: No    Sexual activity: Not on file     Review of Systems   Constitutional: Negative for chills and fever.   HENT: Negative for trouble swallowing and voice change.    Eyes: Negative for pain and redness.   Respiratory: Negative for cough and shortness of breath.    Cardiovascular: Negative for chest pain and leg swelling.   Gastrointestinal: Positive for abdominal pain, blood in stool, diarrhea, nausea and vomiting.   Genitourinary: Negative for difficulty urinating and dysuria.   Musculoskeletal: Negative for neck pain and neck stiffness.   Skin: Negative for rash and wound.   Neurological: Negative for seizures and syncope.     Objective:     Vital Signs (Most Recent):  Temp: 97.6 °F (36.4 °C) (10/09/17 1628)  Pulse: 91 (10/09/17 1820)  Resp: 18 (10/09/17 1820)  BP: (!) 177/76 (10/09/17 1820)  SpO2: 99 % (10/09/17 1820) Vital Signs (24h Range):  Temp:  [97.6 °F (36.4 °C)] 97.6 °F (36.4 °C)  Pulse:  [84-94] 91  Resp:  [18-22] 18  SpO2:  [95 %-99 %] 99 %  BP: ()/(43-76) 177/76      Weight: 60.8 kg (134 lb)  Body mass index is 26.17 kg/m².    Physical Exam   Constitutional: She is oriented to person, place, and time. She appears well-developed. No distress.   HENT:   Head: Normocephalic and atraumatic.   Eyes: Conjunctivae are normal. Pupils are equal, round, and reactive to light.   Neck: Neck supple. No tracheal deviation present.   Cardiovascular: Normal rate and regular rhythm.    Pulmonary/Chest: Effort normal and breath sounds normal. No respiratory distress.   Abdominal: Soft. There is tenderness. There is no rebound and no guarding.   Musculoskeletal: She exhibits no edema or tenderness.   Neurological: She is alert and oriented to person, place, and time.   Psychiatric: She has a normal mood and affect.   Nursing note and vitals reviewed.       Significant Labs: All pertinent labs within the past 24 hours have been reviewed.    Significant Imaging: I have reviewed all pertinent imaging results/findings within the past 24 hours.   CT Abdomen Pelvis Without Contrast 10/09/17:   Findings:      The lung bases are within normal limits.  The heart is unremarkable.  There are extensive calcifications of the abdominal aorta and branch vessels.  There is diminutive appearance of the distal infrarenal abdominal yolk lesion.  Similar findings were present on the prior examination.  Extensive eccentric calcifications also noted along the course of the abdominal aorta.  There also extensive calcification of the branch vessels of the abdominal aorta.  There are subcentimeter lymph nodes in the retroperitoneal lymph node stations.  Scattered lymph nodes are also noted in the mesentery.  The esophagus is unremarkable.  There is mild distention of the stomach.  The duodenum is within normal limits.  There is wall thickening and fluid distention of the small bowel loops.  No definitive transition point is identified.  The appendix is not visualized.  There are no secondary findings of acute  appendicitis.  There is wall thickening of the transverse colon.  There is also wall thickening involving the rectosigmoid colon.  No definite evidence of obstruction is identified.  There is nodular contour of the liver.  The gallbladder is poorly visualized and may be surgically absent.  There is stable prominence of the common bile duct.  There are multiple splenules.  There is extensive vascularity in the left upper abdomen.  The pancreas is unremarkable.  The adrenal glands are within normal limits.  There is atrophic appearance of both kidneys.  There are subcentimeter nonobstructing stones in both kidneys.  The urinary bladder and the ureters are unremarkable.  The patient is status post hysterectomy.  There is no evidence of free fluid in the abdomen or pelvis.  There is no evidence of free air.  No definitive evidence of pneumatosis, allowing for multiple fluid distention of the small bowel loops.  The psoas margins are unremarkable.  There are postoperative changes in the anterior abdominal wall.  The abdominal is otherwise unremarkable.  There are degenerative changes in the osseous structures.  There is a stable anterior compression deformity of the T12 vertebral body.  Impression:  Wall thickening involving the transverse colon and also the rectosigmoid colon.  The findings are suggestive of colitis (ischemic/inflammatory/infectious). Given the extensive atherosclerotic disease and the near occlusion of the infrarenal abdominal aorta,  ischemic colitis is favored.  Suggest clinical correlation. Contrast-enhanced examination may be obtained for further evaluation.  Fluid distention and wall thickening the small bowel loops.  No definitive evidence of pneumatosis.  The findings represent superimposed ischemic enteritis.  Nodular contour of the liver with extensive variceal vessels in the upper abdomen.  The findings represent portal hypertension in the setting of cirrhosis.    Additional findings as  above.    Assessment/Plan:     * Ischemic colitis    Ischemic enteritis  Hematochezia  Acute kidney injury  Chronic abdominal pain  Aortic atherosclerosis  Stenosis of infrarenal abdominal aorta due to atherosclerosis  Patient and daughter did not know what atherosclerosis was much less ischemic colitis, so explained that her smoking caused her to have poor circulation and can lead to bowel gangrene and cause her to die if untreated.  Metformin did not help.    NPO except ice chips and medications.  IV saline at 125 mL/hr.  Ciprofloxacin daily and metronidazole TID.  Although not currently a surgical abdomen, she can decompensate, so asked Dr. Resendiz to page General Surgery on call to evaluate her.  Hold antiplatelet therapy for now due to bleeding but would be useful long-term for atherosclerosis.  Might consider Interventional Cardiology for revascularization later on.  Monitor lactic acid.          Liver cirrhosis secondary to DOSS    Portal hypertension  Stable.          History of stroke    Hold aspirin, clopidrogel, statin.          Essential hypertension    Takes lisinopril 40 mg daily.  Hold to allow optimal perfusion.          Hypertriglyceridemia    Takes atorvastatin, which is risky in cirrhosis.          Type 2 diabetes mellitus without complication, without long-term current use of insulin    Overtreated with low hemoglobin A1c.  Takes metformin, glimepiride, and sitagliptin.  Metformin is straightforward contraindicated.  According to Ms. Conrad's daughter, Dr. Armando has not told her to stop taking it.  Sliding scale insulin aspart and monitor serum glucose.          Coronary artery disease involving nonautologous biological coronary bypass graft without angina pectoris    S/P CABG (coronary artery bypass graft)  Hold aspirin and clopidrogel.            VTE Risk Mitigation     None             Luis Akins MD  Department of Hospital Medicine   [facility]

## 2017-10-10 NOTE — EICU
65 yo woman admitted with sepsis. Worsening clinical condition overnight with increased lactic acid, tachycardia and tachypnea and eventually falling BP.  This has now required intubation and the initiation of vasopressor support with norepi.  In addition I have changed her metronidazole to IV as severe c diff colitis is a possibility.

## 2017-10-11 PROBLEM — E87.5 HYPERKALEMIA: Status: ACTIVE | Noted: 2017-01-01

## 2017-10-11 PROBLEM — R57.1 HYPOVOLEMIC SHOCK: Status: ACTIVE | Noted: 2017-01-01

## 2017-10-11 PROBLEM — R74.8 ABNORMAL LIVER ENZYMES: Status: ACTIVE | Noted: 2017-01-01

## 2017-10-11 PROBLEM — N17.0 ACUTE RENAL FAILURE WITH TUBULAR NECROSIS: Status: ACTIVE | Noted: 2017-01-01

## 2017-10-11 NOTE — PROGRESS NOTES
Death Pronouncement    Patient is a DO NOT RESUSCITATE.  This was an anticipated event after we withdrew care.    I was called to patients bedside to pronounce that patient Ms.Ruth BRITTANEY Conrad has . No spontaneous movements were present. There was not response to verbal or tactile stimuli. Pupils were mid-dilated and fixed. No breath sounds were appreciated over either lung field. No carotid pulses were palpable. No heart sounds were auscultator over entire precordium. Patient pronounced dead at 0750 on 10/11/2017. Family was at her bedside. Autopsy was offered and declined.     Time of Death: 0750 10/11/2017  Cause of Death: Ischemic colitis    Robin Thacker MD

## 2017-10-11 NOTE — PROGRESS NOTES
Update Note:    I updated the family on the patient's condition and we discussed the options for the patient and her continued care. The patient is more alert now and attempts to interact at times. She gets more restless during these times and seems to be in pain. She calms with the prn fentanyl. Her last lactic acid was >12. Her hemoglobin returned to 9.5 from 3.9 after 2 units of PRBCs. She has a large extended family who were present and asked questions during the discussion. Currently, there is nothing to be done for her extensive intestinal ischemia. We are currently supporting her with the ventilator and pressors, but we are not addressing the underlying problem and cannot fix this problem. This was reiterated with the family multiple times. They seem to understand that. We confirmed the prior discussion and decision to be DNR in the event of cardiac arrest at this time and that we will continue to titrate the levophed, but will not add another pressor. We discussed possibly extubating her and stopping the levophed to focus on comfort, but they are not ready for that yet. Will readdress that tomorrow. Will continue with present treatment with antibiotics, IV fluids, levophed and mechanical ventilation.    Critical Care Time: 45 Minutes of critical care time was spent in the care of the patient. This included management of organ failures related to critical illness, titration of continuous infusions, review of pertinent labs and imaging studies, discussion of the patient with consulting services, and discussion of the patients condition with the patient and family.     I spent 20 minutes of face to face discussion regarding advance directives and end of life planning which included patient and family. Patient/Family understands the seriousness of their condition and would like to make their end of life decisions known as noted above.       Robin Thacker MD, [facility]  Staff Hospitalist  Pager:  [phone]  Spectralink: [phone]

## 2017-10-11 NOTE — NURSING
Family at the bedside and have decided to withdraw care. Patient has no gag reflex and sluggish pupillary reflexes. Maximum dose of Levophed infusing. No purposeful movement and patient grossly unresponsive to stimuli. RT removing ETT at present. Will continue to monitor for s/s of respiratory distress and will medicate accordingly with physician orders and collaboration.

## 2017-10-11 NOTE — PLAN OF CARE
No cough or gag, pupils very sluggish, pt not responding to stimuli or voice like before, -140's, BP 60/40's Max'ed on levo, Family at beside. NP paged.

## 2017-10-11 NOTE — CHAPLAIN
Patient was w/d from life support.  Family was in waiting room.  Mixture of sadness, anxiety, some acceptance.  Much supportive presence (16 family members).  They are waiting for someone else to come.  Some went out to smoke and some went to the cafeteria.  I initiated conversation with family and provided grief care. I think the family will return to patient's room before they leave.

## 2017-10-11 NOTE — ASSESSMENT & PLAN NOTE
Hypovolemic shock  Ischemic enteritis  Hematochezia  Acute kidney injury due to ATN  Chronic abdominal pain  Aortic atherosclerosis  Stenosis of infrarenal abdominal aorta due to atherosclerosis  Extensive ischemia on ex-lap per Dr. Martino. No operative intervention available. Lactic acid is >12. Continued antibiotics and pressors. Cdiff PCR is negative. Continue IV fluids. Discussed the grave situation with the daughter, son and extensive family. Will now withdraw care. Extubate this AM to NC. DNR.

## 2017-10-11 NOTE — NURSING
Patient bagged and ready for transport to [facility]. Patient identifiers on body and body bag. All appropriate paperwork completed. [facility] to approach family regarding possible eye donation. Pt to be sent to [facility] for holding.

## 2017-10-11 NOTE — PHYSICIAN QUERY
PT Name: Neha Conrad  MR #: [id number]    Physician Query Form - Consultant Diagnosis Clarification     CDS/: Arely Abdi               Contact information: [email or web address]  This form is a permanent document in the medical record.     Query Date: October 11, 2017      By submitting this query, we are merely seeking further clarification of documentation.  Please utilize your independent clinical judgment when addressing the question(s) below.      The Medical record contains the following:   Diagnosis Supporting Clinical Information Location in Medical Record   Abx coverage for sepsis per primary     Septic shock continues after volume resuscitation.    This may represent worsening ischemic colitis.      Ischemic colitis   Hypovolemic shock   Ischemic enteritis   Hematochezia   Acute kidney injury due to ATN                      Emergent exploratory the morning of 10/10/17 which found severe ischemia of entire small intestine and right colon.  Persistent shock and lactic acidosis  Maximum levophed.    WBC= 34.62    LA= 7.8 - >12   General Surgery Consult Note 10/10    General Surgery Consult Note Attestation 10/10        Hospital Medicine PN 10/11 0812          Labs, CBC 10/09    Labs, Chem Profile 10/09 1711 - 10/10 1051         Do you agree with the Consultants diagnosis of _____Sepsis/Septic Shock_____?                 [   ]   Yes               [  x ]   No                [   ]   Clinically undetermined               [   ]   Other/Clarification of findings: ___________________________________________

## 2017-10-11 NOTE — PLAN OF CARE
Pt now max'ed on levo to maintain MAP 65, -145, Responds to touch and voice, not following commands, Precedex off. IVF remain infusing. Sluggish UOP. Family updated on pt status. NP updated.

## 2017-10-11 NOTE — PLAN OF CARE
Pt HR up to 152 no change in rhythm, titrating Levo up for MAP 65, Precedex at 0.3mcg/kg/hr for RASS -1, PRN Fentanyl administered q1hr as needed for pain. Remains on IVF at 125ml/hr, Insulin gtt at 0.2units/hr with last accu check 120's. Notified GRICEL Lerma--no new orders for HR. Informed to cont to check blood sugar every hour and if blood sugar is less than 120 okay to turn gtt off, if blood sugar is the same or increased, keep gtt on.

## 2017-10-11 NOTE — SUBJECTIVE & OBJECTIVE
Interval History: Continued to worsen overnight. On maximum levophed. Not responsive currently. Family at bedside and ready to withdraw now.     Review of Systems   Unable to perform ROS: Intubated     Objective:     Vital Signs (Most Recent):  Temp: (!) 100.4 °F (38 °C) (10/11/17 0700)  Pulse: 76 (10/11/17 0745)  Resp: (!) 34 (10/11/17 0305)  BP: 99/64 (10/11/17 0600)  SpO2: 95 % (10/11/17 0730) Vital Signs (24h Range):  Temp:  [97.9 °F (36.6 °C)-100.4 °F (38 °C)] 100.4 °F (38 °C)  Pulse:  [] 76  Resp:  [30-35] 34  SpO2:  [47 %-100 %] 95 %  BP: ()/(36-85) 99/64  Arterial Line BP: ()/(19-66) 19/19     Weight: 63 kg (138 lb 14.2 oz)  Body mass index is 27.13 kg/m².    Intake/Output Summary (Last 24 hours) at 10/11/17 0801  Last data filed at 10/11/17 0645   Gross per 24 hour   Intake          8896.81 ml   Output             2615 ml   Net          6281.81 ml      Physical Exam   Constitutional: She appears well-developed. No distress. She is intubated.   HENT:   Head: Normocephalic and atraumatic.   Eyes: Conjunctivae are normal. No scleral icterus.   Cardiovascular: Normal rate and regular rhythm.    Murmur heard.  Pulmonary/Chest: Effort normal and breath sounds normal. She is intubated. No respiratory distress. She has no wheezes.   Abdominal: Soft. She exhibits distension. There is tenderness.   Musculoskeletal: She exhibits no edema or tenderness.   Neurological: She is unresponsive.   Skin: No erythema. There is pallor.   Nursing note and vitals reviewed.      Significant Labs:   CBC:   Recent Labs  Lab 10/10/17  0320  10/10/17  1051 10/10/17  1527 10/11/17  0506   WBC 13.58*  --  9.07  --  20.36*   HGB 9.1*  < > 3.9* 9.5* 9.8*   HCT 29.9*  < > 12.4* 29.9* 32.0*     --  127*  --  196   < > = values in this interval not displayed.  CMP:   Recent Labs  Lab 10/10/17  0320 10/10/17  1003 10/11/17  0506    149* 141   K 4.3 3.5 5.6*    113* 114*   CO2 11* 23 6*   * 98 112*    BUN 50* 44* 46*   CREATININE 1.8* 1.3 2.5*   CALCIUM 8.1* 6.6* 6.6*   PROT 5.7* 3.8* 4.4*   ALBUMIN 2.8* 2.2* 2.3*   BILITOT 1.2* 0.6 2.8*   ALKPHOS 62 37* 98   AST 35 42* 1,731*   ALT 20 27 968*   ANIONGAP 17* 13 21*   EGFRNONAA 29* 43* 20*     Lactic Acid:   Recent Labs  Lab 10/10/17  0740 10/10/17  1051 10/11/17  0506   LACTATE 7.9* >12.0* >12.0*       Significant Imaging: I have reviewed all pertinent imaging results/findings within the past 24 hours.

## 2017-10-11 NOTE — PROGRESS NOTES
10/11/17 0750   Vital Signs   Pulse (!) 0   Heart Rate Source Monitor;Manual   Resp (!) 0   SpO2 (MAK)   Pulse Oximetry Type Continuous   Flow (L/min) 3   O2 Device (Oxygen Therapy) nasal cannula   Art Line   Arterial Line BP 18/18   Arterial Line MAP (mmHg) 18 mmHg   Invasive Hemodynamic Monitoring   CVP (mean) 19 mmHg     Dr. Thacker at the bedside to pronounce patient. TOD per Dr. Thacker 0750. Family at the bedside.

## 2017-10-11 NOTE — PLAN OF CARE
"  Neha Conrad was identified as being in soft 2 point restraints within 24 hours of expiration.  This patient has been entered in the Restraint Mortality Log on 10/11/2017 @ 10:50 am ]."    Meenakshi Howell RN  "

## 2017-10-11 NOTE — PROGRESS NOTES
[facility] Hospital Medicine  Progress Note    Patient Name: Neha Conrad  MRN: [id number]  Patient Class: IP- Inpatient   Admission Date: 10/9/2017  Length of Stay: 2 days  Attending Physician: Robin Thacker MD  Primary Care Provider: Saeed Armando MD        Subjective:     Principal Problem:Ischemic colitis    HPI:  Neha Conrad is a 66 year white woman with type 2 diabetes mellitus, hypertension, hyperlipidemia, coronary artery disease status post coronary artery bypass graft in 2005, aortic atherosclerosis with infrarenal stenosis, carotid artery disease status post carotid endarterectomy, history of stroke, 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  She has chronic, intermittent abdominal pan.  She lives at home with her daughters Cathy and Jing.  Her primary care physician is Dr. Saeed Armando.  Her gastroenterologist is Dr. Jam Wilkerson.               She was seen in the ED on 7/9/17 for worsening abdominal pain and started on dicyclomine and she was also treated with a 7 day course of ciprofloxacin for a urinary tract infection.  She was admitted at [facility] 7/21/17 - 7/23/2017 for nausea, vomiting and diarrhea with positive C. diff antigen and toxin (7/22/2017).  She was started on metronidazole and was feeling well on the day of discharge.  She was hospitalized again 7/25/17-7/27/17 after continuing to have abdominal cramping and vomiting, and was found to have decreased blood counts, an initial blood pressure of 99/53, acute kidney injury (creatinine 1.2), and lactic acidosis (3.4) despite clearance of C. Difficile toxin.  Upper GI endoscopy showed mild gastritis.  Lactate only improved to 2.8 by discharge.              She presented to  [facility] ED on 10/9/17 after nausea, vomiting, diarrhea, hematochezia, and lastly right lower quadrant abdominal pain, beginning when she awoke in the morning.  Like her last hospitalization, her initial blood pressure was again low, but lower at 67/43, she again had acute kidney injury, but worse, with BUN 60 and creatinine 2.7, and lactate was again elevated, but more so than before, at 7.8.  Her right lower quadrant was tender.  With her significant atherosclerosis, ED Dr. Jam Resendiz was concerned for ischemic colitis, which would explain her chronic intermittent abdominal pain.  She was given 2 liters of IV saline with improvement of blood pressures.  She takes lisinopril daily but no longer takes furosemide.   She also takes metformin, which is contraindicated in liver disease due to risk of lactic acidosis.  She was admitted to [facility] with plan for packed RBC transfusion pending non-contrast CT results.  The CT results confirmed Dr. Resendiz's suspicions, additionally showing ischemic enteritis.      Hospital Course:  Taken to the OR for emergent exploratory the morning of 10/10/17 which found severe ischemia of entire small intestine and right colon. She was then closed and discussion with the family regarding the poor prognosis was had by Dr. Monge. We had further discussions through the day and family decided to continue current care and not escalate at this time. Overnight to the morning of 10/11/17, she continued to worsen with acute renal failure and persistent shock and lactic acidosis. Family decided to withdraw care.     Interval History: Continued to worsen overnight. On maximum levophed. Not responsive currently. Family at bedside and ready to withdraw now.     Review of Systems   Unable to perform ROS: Intubated     Objective:     Vital Signs (Most Recent):  Temp: (!) 100.4 °F (38 °C) (10/11/17 0700)  Pulse: 76 (10/11/17 0745)  Resp: (!) 34 (10/11/17  0305)  BP: 99/64 (10/11/17 0600)  SpO2: 95 % (10/11/17 0730) Vital Signs (24h Range):  Temp:  [97.9 °F (36.6 °C)-100.4 °F (38 °C)] 100.4 °F (38 °C)  Pulse:  [] 76  Resp:  [30-35] 34  SpO2:  [47 %-100 %] 95 %  BP: ()/(36-85) 99/64  Arterial Line BP: ()/(19-66) 19/19     Weight: 63 kg (138 lb 14.2 oz)  Body mass index is 27.13 kg/m².    Intake/Output Summary (Last 24 hours) at 10/11/17 0801  Last data filed at 10/11/17 0645   Gross per 24 hour   Intake          8896.81 ml   Output             2615 ml   Net          6281.81 ml      Physical Exam   Constitutional: She appears well-developed. No distress. She is intubated.   HENT:   Head: Normocephalic and atraumatic.   Eyes: Conjunctivae are normal. No scleral icterus.   Cardiovascular: Normal rate and regular rhythm.    Murmur heard.  Pulmonary/Chest: Effort normal and breath sounds normal. She is intubated. No respiratory distress. She has no wheezes.   Abdominal: Soft. She exhibits distension. There is tenderness.   Musculoskeletal: She exhibits no edema or tenderness.   Neurological: She is unresponsive.   Skin: No erythema. There is pallor.   Nursing note and vitals reviewed.      Significant Labs:   CBC:   Recent Labs  Lab 10/10/17  0320  10/10/17  1051 10/10/17  1527 10/11/17  0506   WBC 13.58*  --  9.07  --  20.36*   HGB 9.1*  < > 3.9* 9.5* 9.8*   HCT 29.9*  < > 12.4* 29.9* 32.0*     --  127*  --  196   < > = values in this interval not displayed.  CMP:   Recent Labs  Lab 10/10/17  0320 10/10/17  1003 10/11/17  0506    149* 141   K 4.3 3.5 5.6*    113* 114*   CO2 11* 23 6*   * 98 112*   BUN 50* 44* 46*   CREATININE 1.8* 1.3 2.5*   CALCIUM 8.1* 6.6* 6.6*   PROT 5.7* 3.8* 4.4*   ALBUMIN 2.8* 2.2* 2.3*   BILITOT 1.2* 0.6 2.8*   ALKPHOS 62 37* 98   AST 35 42* 1,731*   ALT 20 27 968*   ANIONGAP 17* 13 21*   EGFRNONAA 29* 43* 20*     Lactic Acid:   Recent Labs  Lab 10/10/17  0740 10/10/17  1051 10/11/17  0506   LACTATE 7.9*  >12.0* >12.0*       Significant Imaging: I have reviewed all pertinent imaging results/findings within the past 24 hours.    Assessment/Plan:      * Ischemic colitis    Hypovolemic shock  Ischemic enteritis  Hematochezia  Acute kidney injury due to ATN  Chronic abdominal pain  Aortic atherosclerosis  Stenosis of infrarenal abdominal aorta due to atherosclerosis  Extensive ischemia on ex-lap per Dr. Martino. No operative intervention available. Lactic acid is >12. Continued antibiotics and pressors. Cdiff PCR is negative. Continue IV fluids. Discussed the grave situation with the daughter, son and extensive family. Will now withdraw care. Extubate this AM to NC. DNR.         Acute blood loss anemia    Hematochezia  Hgb down to 3.9. Will transfuse at least 1 unit of blood now. Reassess with family later.         Liver cirrhosis secondary to DOSS    Portal hypertension  Stable.          History of stroke    Hold aspirin, clopidrogel, statin.          Essential hypertension    Holding home lisinopril 40 mg daily as on pressors          Hypertriglyceridemia    Takes atorvastatin, which is risky in cirrhosis.          Type 2 diabetes mellitus without complication, without long-term current use of insulin    Sliding scale insulin aspart and monitor serum glucose.          Coronary artery disease involving nonautologous biological coronary bypass graft without angina pectoris    S/P CABG (coronary artery bypass graft)  Hold aspirin and clopidrogel.            VTE Risk Mitigation         Ordered     Medium Risk of VTE  Once      10/09/17 2149     Place BETH hose  Until discontinued      10/09/17 2149          Critical care time spent on the evaluation and treatment of severe organ dysfunction, review of pertinent labs and imaging studies, discussions with consulting providers and discussions with patient/family: 40 minutes.    Robin Thacker MD  Department of Hospital Medicine   [facility]

## 2017-10-11 NOTE — NURSING
Asystole noted on bedside monitor. Patient's family at the bedside and notified. Dr. Thacker called to pronounce patient. EKG strip printed in 2 leads and placed in patient's telemetry record.

## 2017-10-11 NOTE — PLAN OF CARE
Problem: Patient Care Overview  Goal: Plan of Care Review  Outcome: Ongoing (interventions implemented as appropriate)  Report received from day shift RNAnn. Pt. Awake, intubated, breathing over vent 35bpm, moving in bed, appears uncomfortable, arouses to voice but does not follow commands, -150's, titrating Levo for MAP >65 based on A-line pressure. A-line leveled and re-zeroed. IVF infusing at 200ml/hr, Levo at 0.7mcg/kg/min and insulin at 0.8units/hr. Dr. Thacker at bedside and aware of pt status and vitals. Verbals orders to cont to admin prn Fentanyl and will start continuous Precedex. IVF rate change to 125ml/hr. Abx orders. Continue to monitor. Family at bedside.

## 2017-10-13 NOTE — DISCHARGE SUMMARY
[facility] Medicine  Discharge Summary      Patient Name: Neha Conrad  MRN: [id number]  Admission Date: 10/9/2017  Hospital Length of Stay: 2 days  Discharge Date and Time: 10/11/2017  7:50 AM  Attending Physician: Robin Thacker MD   Discharging Provider: Robin Thacker MD  Primary Care Provider: Saeed Armando MD      HPI:   Neha Conrad is a 66 year white woman with type 2 diabetes mellitus, hypertension, hyperlipidemia, coronary artery disease status post coronary artery bypass graft in 2005, aortic atherosclerosis with infrarenal stenosis, carotid artery disease status post carotid endarterectomy, history of stroke, cirrhosis due to nonalcoholic hepatosteatosis with portal hypertension (diagnosed at [facility] in 2015), gastroesophageal reflux disease, history of ventral hernia repair with mesh in June 2017, and history of iron overload on 7/25/17.  She has chronic, intermittent abdominal pan.  She lives at home with her daughters Cathy and Jing.  Her primary care physician is Dr. Saeed Armando.  Her gastroenterologist is Dr. Jam Wilkerson.               She was seen in the ED on 7/9/17 for worsening abdominal pain and started on dicyclomine and she was also treated with a 7 day course of ciprofloxacin for a urinary tract infection.  She was admitted at [facility] 7/21/17 - 7/23/2017 for nausea, vomiting and diarrhea with positive C. diff antigen and toxin (7/22/2017).  She was started on metronidazole and was feeling well on the day of discharge.  She was hospitalized again 7/25/17-7/27/17 after continuing to have abdominal cramping and vomiting, and was found to have decreased blood counts, an initial blood pressure of 99/53, acute kidney injury (creatinine 1.2), and lactic acidosis (3.4) despite clearance of C. Difficile toxin.  Upper GI endoscopy showed mild gastritis.  Lactate only improved to 2.8 by discharge.               She presented to [facility] ED on 10/9/17 after nausea, vomiting, diarrhea, hematochezia, and lastly right lower quadrant abdominal pain, beginning when she awoke in the morning.  Like her last hospitalization, her initial blood pressure was again low, but lower at 67/43, she again had acute kidney injury, but worse, with BUN 60 and creatinine 2.7, and lactate was again elevated, but more so than before, at 7.8.  Her right lower quadrant was tender.  With her significant atherosclerosis, ED Dr. Jam Resendiz was concerned for ischemic colitis, which would explain her chronic intermittent abdominal pain.  She was given 2 liters of IV saline with improvement of blood pressures.  She takes lisinopril daily but no longer takes furosemide.   She also takes metformin, which is contraindicated in liver disease due to risk of lactic acidosis.  She was admitted to [facility] with plan for packed RBC transfusion pending non-contrast CT results.  The CT results confirmed Dr. Resendiz's suspicions, additionally showing ischemic enteritis.      Procedure(s) (LRB):  EXPLORATORY-LAPAROTOMY (N/A)      Indwelling Lines/Drains at time of discharge:   Lines/Drains/Airways     Central Venous Catheter Line                 Percutaneous Central Line Insertion/Assessment - triple lumen  10/10/17 0901 right internal jugular 2 days          Drain                 NG/OG Tube 10/10/17 0800 Charles sump 16 Fr. Left mouth 2 days         Urethral Catheter 10/10/17 0805 Latex 16 Fr. 2 days          Airway                 Airway - Non-Surgical 10/10/17 0648 Other (Comment) 2 days          Arterial Line                 Arterial Line 10/10/17 0652 Left Radial 2 days              Hospital Course:   Taken to the OR for emergent exploratory the morning of 10/10/17 which found severe ischemia of entire small intestine and right colon. She was then closed and discussion with the family regarding the poor prognosis was had by  Dr. Monge. We had further discussions through the day and family decided to continue current care and not escalate at this time. Overnight to the morning of 10/11/17, she continued to worsen with acute renal failure and persistent shock and lactic acidosis. Family decided to withdraw care. She was pronounced dead at 0750 on 10/11/17.     Consults:   Consults         Status Ordering Provider     Inpatient consult to General Surgery  Once     Provider:  Anabela Martino MD    Completed MICHEAL SALAZAR          Significant Diagnostic Studies: Labs:   CMP   Recent Labs  Lab 10/11/17  0506      K 5.6*   *   CO2 6*   *   BUN 46*   CREATININE 2.5*   CALCIUM 6.6*   PROT 4.4*   ALBUMIN 2.3*   BILITOT 2.8*   ALKPHOS 98   AST 1,731*   *   ANIONGAP 21*   ESTGFRAFRICA 23*   EGFRNONAA 20*    and CBC   Recent Labs  Lab 10/11/17  0506   WBC 20.36*   HGB 9.8*   HCT 32.0*        Radiology: CT scan:  CT ABDOMEN PELVIS WITHOUT CONTRAST:   Results for orders placed or performed during the hospital encounter of 10/09/17   CT Abdomen Pelvis  Without Contrast    Narrative    Exam: [id number]  10/09/17  18:16:05 [id number] ([facility]) : CT ABDOMEN PELVIS WITHOUT CONTRAST    Technique:    This exam was done specifically to evaluate for renal stone disease as per request.  Therefore, no oral and no IV contrast was used. Using helical CT technique, axial images of the abdomen and pelvis were obtained from the top of the liver through the base of the bladder.    Comparison:    07/21/2017    Findings:      The lung bases are within normal limits.  The heart is unremarkable.  There are extensive calcifications of the abdominal aorta and branch vessels.  There is diminutive appearance of the distal infrarenal abdominal yolk lesion.  Similar findings were present on the prior examination.  Extensive eccentric calcifications also noted along the course of the abdominal aorta.  There also extensive calcification of the branch  vessels of the abdominal aorta.  There are subcentimeter lymph nodes in the retroperitoneal lymph node stations.  Scattered lymph nodes are also noted in the mesentery.    The esophagus is unremarkable.  There is mild distention of the stomach.  The duodenum is within normal limits.  There is wall thickening and fluid distention of the small bowel loops.  No definitive transition point is identified.  The appendix is not visualized.  There are no secondary findings of acute appendicitis.  There is wall thickening of the transverse colon.  There is also wall thickening involving the rectosigmoid colon.  No definite evidence of obstruction is identified.    There is nodular contour of the liver.  The gallbladder is poorly visualized and may be surgically absent.  There is stable prominence of the common bile duct.  There are multiple splenules.  There is extensive vascularity in the left upper abdomen.  The pancreas is unremarkable.  The adrenal glands are within normal limits.    There is atrophic appearance of both kidneys.  There are subcentimeter nonobstructing stones in both kidneys.  The urinary bladder and the ureters are unremarkable.  The patient is status post hysterectomy.    There is no evidence of free fluid in the abdomen or pelvis.  There is no evidence of free air.  No definitive evidence of pneumatosis, allowing for multiple fluid distention of the small bowel loops.    The psoas margins are unremarkable.  There are postoperative changes in the anterior abdominal wall.  The abdominal is otherwise unremarkable.  There are degenerative changes in the osseous structures.  There is a stable anterior compression deformity of the T12 vertebral body.    Impression       Wall thickening involving the transverse colon and also the rectosigmoid colon.  The findings are suggestive of colitis (ischemic/inflammatory/infectious). Given the extensive atherosclerotic disease and the near occlusion of the infrarenal  abdominal aorta,  ischemic colitis is favored.  Suggest clinical correlation. Contrast-enhanced examination may be obtained for further evaluation.    Fluid distention and wall thickening the small bowel loops.  No definitive evidence of pneumatosis.  The findings represent superimposed ischemic enteritis.    Nodular contour of the liver with extensive variceal vessels in the upper abdomen.  The findings represent portal hypertension in the setting of cirrhosis.      Additional findings as above.    Report has been flagged in the EPIC medical record system.              Electronically signed by: RIKI CANSECO MD  Date:     10/09/17  Time:    18:45         Pending Diagnostic Studies:     None        Final Active Diagnoses:    Diagnosis Date Noted POA    PRINCIPAL PROBLEM:  Ischemic colitis [K55.9] 10/09/2017 Yes    Abnormal liver enzymes [R74.8] 10/11/2017 Yes    Hypovolemic shock [R57.1] 10/11/2017 Yes    Hyperkalemia [E87.5] 10/11/2017 No    Acute renal failure with tubular necrosis [N17.0] 10/11/2017 Yes    Hypernatremia [E87.0] 10/10/2017 No    Thrombocytopenia [D69.6] 10/10/2017 No    Acute kidney injury [N17.9] 10/09/2017 Yes    Hematochezia [K92.1] 10/09/2017 Yes    Portal hypertension [K76.6] 10/09/2017 Yes     Chronic    Ischemic enteritis [K55.9] 10/09/2017 Yes    Acute blood loss anemia [D62] 07/27/2017 Yes    Chronic abdominal pain [R10.9, G89.29] 07/25/2017 Yes     Chronic    Aortic atherosclerosis [I70.0] 07/21/2017 Yes     Chronic    Stenosis of infrarenal abdominal aorta due to atherosclerosis [I70.0] 07/21/2017 Yes     Chronic    Liver cirrhosis secondary to DOSS [K75.81, K74.60] 06/20/2017 Yes     Chronic    S/P CABG (coronary artery bypass graft) [Z95.1] 03/04/2016 Not Applicable     Chronic    History of stroke [Z86.73] 03/04/2016 Not Applicable     Chronic    Essential hypertension [I10]  Yes     Chronic    Type 2 diabetes mellitus without complication, without long-term current  use of insulin [E11.9] 2015 Yes     Chronic    Coronary artery disease involving nonautologous biological coronary bypass graft without angina pectoris [I25.810] 2015 Yes     Chronic    Hypertriglyceridemia [E78.1] 2015 Yes     Chronic      Problems Resolved During this Admission:    Diagnosis Date Noted Date Resolved POA      No new Assessment & Plan notes have been filed under this hospital service since the last note was generated.  Service: Hospital Medicine      Discharged Condition:     Disposition:     Follow Up:    Patient Instructions:   No discharge procedures on file.  Medications:  None (patient  at medical facility)     Time spent on the discharge of patient: 35 minutes      Robin Thacker MD  Department of Hospital Medicine  [facility]

## 2017-10-14 LAB — BACTERIA BLD CULT: NORMAL

## 2017-10-15 LAB — BACTERIA BLD CULT: NORMAL

## 2017-10-19 NOTE — PHYSICIAN QUERY
PT Name: Neha Conrad  MR #: [id number]     Physician Query Form - Documentation Clarification      CDS/: Monica Gann RN, CDS               Contact information: [email or web address]    This form is a permanent document in the medical record.     Query Date: 2017    By submitting this query, we are merely seeking further clarification of documentation. Please utilize your independent clinical judgment when addressing the question(s) below.    The Medical record reflects the following:    Supporting Clinical Findings Location in Medical Record     --Received call from nursing staff regarding patient's respiratory rate 40s-50s, tachycardia 120s-130s, and hypotension.  Patient appears to be decompensating.    --Change in respiratory status noted, tachyphenic, respiratory rate between 40-50's    --Worsening clinical condition overnight with increased lactic acid, tachycardia and tachypnea and eventually falling BP. This has now required intubation and the initiation of vasopressor support with norepi    --Pt in respiratory distress with POX trending down to 80's. Lily called anesthesiologist for emergent intubation  --respiratory at bedside. Pt intubated     Hosp Med Note 10/10 @ 608a        Nursing Note 10/10 @ 743a      CCM eICU note 10/10@ 648a          Nursing POC note 10/10 @ 749a     --AB.363/17.2/86/97% On RA             7.086/36.1/306/100% on Vent w/ 100% Fio2     ABG 10/10 @ 0606           10/10 @ 0704                                                                            Doctor, Please specify diagnosis or diagnoses associated with above clinical findings.        Please specify the diagnosis associated with the reason for intubation:        Provider Use Only    [ x ] Acute respiratory distress  [  ] Acute respiratory insufficiency  [  ] Acute respiratory failure with hypoxia  [  ] Acute respiratory with hypercapnia  [  ] Acute respiratory failure with hypoxia and  hypercapnia  [  ] Airway protection  [  ] Other diagnosis: _____________                                                                                                           [  ] Clinically undetermined
